# Patient Record
Sex: FEMALE | Race: WHITE | NOT HISPANIC OR LATINO | URBAN - METROPOLITAN AREA
[De-identification: names, ages, dates, MRNs, and addresses within clinical notes are randomized per-mention and may not be internally consistent; named-entity substitution may affect disease eponyms.]

---

## 2020-01-29 ENCOUNTER — INPATIENT (INPATIENT)
Facility: HOSPITAL | Age: 75
LOS: 5 days | Discharge: NURSING, REHAB OR LONG-TERM CARE | DRG: 301 | End: 2020-02-04
Attending: INTERNAL MEDICINE | Admitting: INTERNAL MEDICINE
Payer: MEDICARE

## 2020-01-29 VITALS
OXYGEN SATURATION: 100 % | WEIGHT: 130.07 LBS | HEIGHT: 65 IN | TEMPERATURE: 98 F | SYSTOLIC BLOOD PRESSURE: 113 MMHG | DIASTOLIC BLOOD PRESSURE: 61 MMHG | HEART RATE: 81 BPM | RESPIRATION RATE: 18 BRPM

## 2020-01-29 DIAGNOSIS — I82.409 ACUTE EMBOLISM AND THROMBOSIS OF UNSPECIFIED DEEP VEINS OF UNSPECIFIED LOWER EXTREMITY: ICD-10-CM

## 2020-01-29 LAB
ALBUMIN SERPL ELPH-MCNC: 3.4 G/DL — SIGNIFICANT CHANGE UP (ref 3.3–5)
ALP SERPL-CCNC: 121 U/L — HIGH (ref 40–120)
ALT FLD-CCNC: 24 U/L — SIGNIFICANT CHANGE UP (ref 12–78)
ANION GAP SERPL CALC-SCNC: 6 MMOL/L — SIGNIFICANT CHANGE UP (ref 5–17)
APTT BLD: 39.2 SEC — HIGH (ref 27.5–36.3)
AST SERPL-CCNC: 14 U/L — LOW (ref 15–37)
BASOPHILS # BLD AUTO: 0.04 K/UL — SIGNIFICANT CHANGE UP (ref 0–0.2)
BASOPHILS NFR BLD AUTO: 0.5 % — SIGNIFICANT CHANGE UP (ref 0–2)
BILIRUB SERPL-MCNC: 0.5 MG/DL — SIGNIFICANT CHANGE UP (ref 0.2–1.2)
BUN SERPL-MCNC: 20 MG/DL — SIGNIFICANT CHANGE UP (ref 7–23)
CALCIUM SERPL-MCNC: 8.5 MG/DL — SIGNIFICANT CHANGE UP (ref 8.5–10.1)
CHLORIDE SERPL-SCNC: 109 MMOL/L — HIGH (ref 96–108)
CO2 SERPL-SCNC: 25 MMOL/L — SIGNIFICANT CHANGE UP (ref 22–31)
CREAT SERPL-MCNC: 0.95 MG/DL — SIGNIFICANT CHANGE UP (ref 0.5–1.3)
EOSINOPHIL # BLD AUTO: 0.09 K/UL — SIGNIFICANT CHANGE UP (ref 0–0.5)
EOSINOPHIL NFR BLD AUTO: 1.2 % — SIGNIFICANT CHANGE UP (ref 0–6)
GLUCOSE SERPL-MCNC: 95 MG/DL — SIGNIFICANT CHANGE UP (ref 70–99)
HCT VFR BLD CALC: 34.2 % — LOW (ref 34.5–45)
HGB BLD-MCNC: 11.3 G/DL — LOW (ref 11.5–15.5)
IMM GRANULOCYTES NFR BLD AUTO: 0.5 % — SIGNIFICANT CHANGE UP (ref 0–1.5)
INR BLD: 1.8 RATIO — HIGH (ref 0.88–1.16)
LYMPHOCYTES # BLD AUTO: 2.1 K/UL — SIGNIFICANT CHANGE UP (ref 1–3.3)
LYMPHOCYTES # BLD AUTO: 27.8 % — SIGNIFICANT CHANGE UP (ref 13–44)
MCHC RBC-ENTMCNC: 30.9 PG — SIGNIFICANT CHANGE UP (ref 27–34)
MCHC RBC-ENTMCNC: 33 GM/DL — SIGNIFICANT CHANGE UP (ref 32–36)
MCV RBC AUTO: 93.4 FL — SIGNIFICANT CHANGE UP (ref 80–100)
MONOCYTES # BLD AUTO: 0.64 K/UL — SIGNIFICANT CHANGE UP (ref 0–0.9)
MONOCYTES NFR BLD AUTO: 8.5 % — SIGNIFICANT CHANGE UP (ref 2–14)
NEUTROPHILS # BLD AUTO: 4.65 K/UL — SIGNIFICANT CHANGE UP (ref 1.8–7.4)
NEUTROPHILS NFR BLD AUTO: 61.5 % — SIGNIFICANT CHANGE UP (ref 43–77)
PLATELET # BLD AUTO: 240 K/UL — SIGNIFICANT CHANGE UP (ref 150–400)
POTASSIUM SERPL-MCNC: 3.6 MMOL/L — SIGNIFICANT CHANGE UP (ref 3.5–5.3)
POTASSIUM SERPL-SCNC: 3.6 MMOL/L — SIGNIFICANT CHANGE UP (ref 3.5–5.3)
PROT SERPL-MCNC: 6.6 GM/DL — SIGNIFICANT CHANGE UP (ref 6–8.3)
PROTHROM AB SERPL-ACNC: 20.4 SEC — HIGH (ref 10–12.9)
RBC # BLD: 3.66 M/UL — LOW (ref 3.8–5.2)
RBC # FLD: 15.1 % — HIGH (ref 10.3–14.5)
SODIUM SERPL-SCNC: 140 MMOL/L — SIGNIFICANT CHANGE UP (ref 135–145)
TROPONIN I SERPL-MCNC: <0.015 NG/ML — SIGNIFICANT CHANGE UP (ref 0.01–0.04)
WBC # BLD: 7.56 K/UL — SIGNIFICANT CHANGE UP (ref 3.8–10.5)
WBC # FLD AUTO: 7.56 K/UL — SIGNIFICANT CHANGE UP (ref 3.8–10.5)

## 2020-01-29 PROCEDURE — 93010 ELECTROCARDIOGRAM REPORT: CPT

## 2020-01-29 PROCEDURE — 81001 URINALYSIS AUTO W/SCOPE: CPT

## 2020-01-29 PROCEDURE — 97163 PT EVAL HIGH COMPLEX 45 MIN: CPT | Mod: GP

## 2020-01-29 PROCEDURE — 85730 THROMBOPLASTIN TIME PARTIAL: CPT

## 2020-01-29 PROCEDURE — 83735 ASSAY OF MAGNESIUM: CPT

## 2020-01-29 PROCEDURE — 97116 GAIT TRAINING THERAPY: CPT | Mod: GP

## 2020-01-29 PROCEDURE — 93971 EXTREMITY STUDY: CPT | Mod: 26,LT

## 2020-01-29 PROCEDURE — 86803 HEPATITIS C AB TEST: CPT

## 2020-01-29 PROCEDURE — 80048 BASIC METABOLIC PNL TOTAL CA: CPT

## 2020-01-29 PROCEDURE — 85610 PROTHROMBIN TIME: CPT

## 2020-01-29 PROCEDURE — 85027 COMPLETE CBC AUTOMATED: CPT

## 2020-01-29 PROCEDURE — 36415 COLL VENOUS BLD VENIPUNCTURE: CPT

## 2020-01-29 PROCEDURE — 84100 ASSAY OF PHOSPHORUS: CPT

## 2020-01-29 PROCEDURE — 71045 X-RAY EXAM CHEST 1 VIEW: CPT | Mod: 26

## 2020-01-29 RX ORDER — HEPARIN SODIUM 5000 [USP'U]/ML
5000 INJECTION INTRAVENOUS; SUBCUTANEOUS EVERY 6 HOURS
Refills: 0 | Status: DISCONTINUED | OUTPATIENT
Start: 2020-01-29 | End: 2020-01-30

## 2020-01-29 RX ORDER — HEPARIN SODIUM 5000 [USP'U]/ML
INJECTION INTRAVENOUS; SUBCUTANEOUS
Qty: 25000 | Refills: 0 | Status: DISCONTINUED | OUTPATIENT
Start: 2020-01-29 | End: 2020-01-30

## 2020-01-29 RX ORDER — HEPARIN SODIUM 5000 [USP'U]/ML
2500 INJECTION INTRAVENOUS; SUBCUTANEOUS EVERY 6 HOURS
Refills: 0 | Status: DISCONTINUED | OUTPATIENT
Start: 2020-01-29 | End: 2020-01-30

## 2020-01-29 RX ORDER — HEPARIN SODIUM 5000 [USP'U]/ML
5000 INJECTION INTRAVENOUS; SUBCUTANEOUS ONCE
Refills: 0 | Status: COMPLETED | OUTPATIENT
Start: 2020-01-29 | End: 2020-01-29

## 2020-01-29 RX ADMIN — HEPARIN SODIUM 1100 UNIT(S)/HR: 5000 INJECTION INTRAVENOUS; SUBCUTANEOUS at 22:55

## 2020-01-29 RX ADMIN — HEPARIN SODIUM 5000 UNIT(S): 5000 INJECTION INTRAVENOUS; SUBCUTANEOUS at 22:52

## 2020-01-29 NOTE — ED PROVIDER NOTE - OBJECTIVE STATEMENT
75 y/o female with PMHx of DVT presents to the ED BIBEMS from [facility] c/o intermittent LLE pain x1 month. Endorses associated +LLE edema. Reports she was dx with LLE DVT at the end of December and was started on Xarelto, but has not had relief of sx. No fever, SOB, or CP. NKDA.

## 2020-01-29 NOTE — ED ADULT NURSE NOTE - OBJECTIVE STATEMENT
Pt presents to the ED for eval of LLE pain, redness and swelling. Pt states she had an outpt doppler done at her residence, was told she has a +DVT and needs to come to the ED for further evaluation. Pt denies chest pain/sob at this time, no recent travel. Pt with +redness, swelling and tenderness to left leg.

## 2020-01-29 NOTE — ED ADULT NURSE REASSESSMENT NOTE - NS ED NURSE REASSESS COMMENT FT1
Patient received at 2200.  Alert to person and place and month.  Patient started on Heparin drip.  Repeat Pt/Ptt to be drawn at 0426 AM.  Patient repositioned for comfort at this time.  Will continue to monitor.

## 2020-01-29 NOTE — ED PROVIDER NOTE - NSTIMEPROVIDERCAREINITIATE_GEN_ER
Fax Date: 02/03/19  Received I/n clinic on: 02/04/19    Received from: [id number] is requesting the following: Resident has an 25 Fr 10cc diaz placed for neurogenic bladder. The last 2 days she has had to have the diaz replaced 4 times d/t it coming out. Can we increase the balloon size? Orders per Craven Dakins APNP: Ok to size up balloon.     Med list updated  Faxed back to facility  Labs sent to STAT Scan: N/A  Labs sent to Scanning via interoffice: N/A      / 29-Jan-2020 20:50

## 2020-01-29 NOTE — ED ADULT NURSE NOTE - NSIMPLEMENTINTERV_GEN_ALL_ED
Implemented All Fall Risk Interventions:  [place] to call system. Call bell, personal items and telephone within reach. Instruct patient to call for assistance. Room bathroom lighting operational. Non-slip footwear when patient is off stretcher. Physically safe environment: no spills, clutter or unnecessary equipment. Stretcher in lowest position, wheels locked, appropriate side rails in place. Provide visual cue, wrist band, yellow gown, etc. Monitor gait and stability. Monitor for mental status changes and reorient to person, place, and time. Review medications for side effects contributing to fall risk. Reinforce activity limits and safety measures with patient and family.

## 2020-01-29 NOTE — ED PROVIDER NOTE - NS_ ATTENDINGSCRIBEDETAILS _ED_A_ED_FT
I, Jose Cadena MD,  performed the initial face to face bedside interview with this patient regarding history of present illness, review of symptoms and relevant past medical, social and family history.  I completed an independent physical examination.    The history, relevant review of systems, past medical and surgical history, medical decision making, and physical examination was documented by the scribe in my presence and I attest to the accuracy of the documentation.

## 2020-01-29 NOTE — ED ADULT TRIAGE NOTE - CHIEF COMPLAINT QUOTE
pt presents to ED with complaints of left lower leg pain. pt states leg is also red, hot, and swollen. pt brought in from [facility]

## 2020-01-30 DIAGNOSIS — Z98.890 OTHER SPECIFIED POSTPROCEDURAL STATES: Chronic | ICD-10-CM

## 2020-01-30 DIAGNOSIS — I80.00 PHLEBITIS AND THROMBOPHLEBITIS OF SUPERFICIAL VESSELS OF UNSPECIFIED LOWER EXTREMITY: ICD-10-CM

## 2020-01-30 LAB
ANION GAP SERPL CALC-SCNC: 6 MMOL/L — SIGNIFICANT CHANGE UP (ref 5–17)
APTT BLD: 45.1 SEC — HIGH (ref 27.5–36.3)
APTT BLD: 89.9 SEC — HIGH (ref 27.5–36.3)
APTT BLD: > 200 SEC (ref 27.5–36.3)
BUN SERPL-MCNC: 18 MG/DL — SIGNIFICANT CHANGE UP (ref 7–23)
CALCIUM SERPL-MCNC: 8.3 MG/DL — LOW (ref 8.5–10.1)
CHLORIDE SERPL-SCNC: 112 MMOL/L — HIGH (ref 96–108)
CO2 SERPL-SCNC: 23 MMOL/L — SIGNIFICANT CHANGE UP (ref 22–31)
CREAT SERPL-MCNC: 0.68 MG/DL — SIGNIFICANT CHANGE UP (ref 0.5–1.3)
GLUCOSE SERPL-MCNC: 96 MG/DL — SIGNIFICANT CHANGE UP (ref 70–99)
HCT VFR BLD CALC: 30.1 % — LOW (ref 34.5–45)
HGB BLD-MCNC: 10 G/DL — LOW (ref 11.5–15.5)
INR BLD: 1.3 RATIO — HIGH (ref 0.88–1.16)
MAGNESIUM SERPL-MCNC: 2 MG/DL — SIGNIFICANT CHANGE UP (ref 1.6–2.6)
MCHC RBC-ENTMCNC: 30.8 PG — SIGNIFICANT CHANGE UP (ref 27–34)
MCHC RBC-ENTMCNC: 33.2 GM/DL — SIGNIFICANT CHANGE UP (ref 32–36)
MCV RBC AUTO: 92.6 FL — SIGNIFICANT CHANGE UP (ref 80–100)
PHOSPHATE SERPL-MCNC: 3.3 MG/DL — SIGNIFICANT CHANGE UP (ref 2.5–4.5)
PLATELET # BLD AUTO: 203 K/UL — SIGNIFICANT CHANGE UP (ref 150–400)
POTASSIUM SERPL-MCNC: 3.5 MMOL/L — SIGNIFICANT CHANGE UP (ref 3.5–5.3)
POTASSIUM SERPL-SCNC: 3.5 MMOL/L — SIGNIFICANT CHANGE UP (ref 3.5–5.3)
PROTHROM AB SERPL-ACNC: 14.5 SEC — HIGH (ref 10–12.9)
RBC # BLD: 3.25 M/UL — LOW (ref 3.8–5.2)
RBC # FLD: 14.8 % — HIGH (ref 10.3–14.5)
SODIUM SERPL-SCNC: 141 MMOL/L — SIGNIFICANT CHANGE UP (ref 135–145)
WBC # BLD: 6.94 K/UL — SIGNIFICANT CHANGE UP (ref 3.8–10.5)
WBC # FLD AUTO: 6.94 K/UL — SIGNIFICANT CHANGE UP (ref 3.8–10.5)

## 2020-01-30 PROCEDURE — 99223 1ST HOSP IP/OBS HIGH 75: CPT

## 2020-01-30 RX ORDER — ALPRAZOLAM 0.25 MG
0.5 TABLET ORAL
Refills: 0 | Status: DISCONTINUED | OUTPATIENT
Start: 2020-01-30 | End: 2020-02-01

## 2020-01-30 RX ORDER — ACETAMINOPHEN 500 MG
325 TABLET ORAL EVERY 6 HOURS
Refills: 0 | Status: DISCONTINUED | OUTPATIENT
Start: 2020-01-30 | End: 2020-02-04

## 2020-01-30 RX ORDER — CHOLECALCIFEROL (VITAMIN D3) 125 MCG
1000 CAPSULE ORAL DAILY
Refills: 0 | Status: DISCONTINUED | OUTPATIENT
Start: 2020-01-30 | End: 2020-02-04

## 2020-01-30 RX ORDER — GABAPENTIN 400 MG/1
100 CAPSULE ORAL
Refills: 0 | Status: DISCONTINUED | OUTPATIENT
Start: 2020-01-30 | End: 2020-02-04

## 2020-01-30 RX ORDER — ENOXAPARIN SODIUM 100 MG/ML
60 INJECTION SUBCUTANEOUS EVERY 12 HOURS
Refills: 0 | Status: DISCONTINUED | OUTPATIENT
Start: 2020-01-30 | End: 2020-02-04

## 2020-01-30 RX ORDER — WARFARIN SODIUM 2.5 MG/1
5 TABLET ORAL DAILY
Refills: 0 | Status: DISCONTINUED | OUTPATIENT
Start: 2020-01-30 | End: 2020-02-01

## 2020-01-30 RX ORDER — LANOLIN ALCOHOL/MO/W.PET/CERES
3 CREAM (GRAM) TOPICAL ONCE
Refills: 0 | Status: COMPLETED | OUTPATIENT
Start: 2020-01-30 | End: 2020-01-30

## 2020-01-30 RX ORDER — PANTOPRAZOLE SODIUM 20 MG/1
40 TABLET, DELAYED RELEASE ORAL
Refills: 0 | Status: DISCONTINUED | OUTPATIENT
Start: 2020-01-30 | End: 2020-01-30

## 2020-01-30 RX ORDER — POLYETHYLENE GLYCOL 3350 17 G/17G
17 POWDER, FOR SOLUTION ORAL DAILY
Refills: 0 | Status: DISCONTINUED | OUTPATIENT
Start: 2020-01-30 | End: 2020-02-04

## 2020-01-30 RX ADMIN — Medication 3 MILLIGRAM(S): at 22:55

## 2020-01-30 RX ADMIN — Medication 1000 UNIT(S): at 15:53

## 2020-01-30 RX ADMIN — Medication 325 MILLIGRAM(S): at 18:11

## 2020-01-30 RX ADMIN — HEPARIN SODIUM 900 UNIT(S)/HR: 5000 INJECTION INTRAVENOUS; SUBCUTANEOUS at 08:39

## 2020-01-30 RX ADMIN — ENOXAPARIN SODIUM 60 MILLIGRAM(S): 100 INJECTION SUBCUTANEOUS at 18:09

## 2020-01-30 RX ADMIN — HEPARIN SODIUM 0 UNIT(S)/HR: 5000 INJECTION INTRAVENOUS; SUBCUTANEOUS at 06:40

## 2020-01-30 RX ADMIN — Medication 325 MILLIGRAM(S): at 16:43

## 2020-01-30 RX ADMIN — Medication 325 MILLIGRAM(S): at 12:56

## 2020-01-30 RX ADMIN — HEPARIN SODIUM 900 UNIT(S)/HR: 5000 INJECTION INTRAVENOUS; SUBCUTANEOUS at 15:50

## 2020-01-30 RX ADMIN — Medication 0.5 MILLIGRAM(S): at 16:43

## 2020-01-30 RX ADMIN — WARFARIN SODIUM 5 MILLIGRAM(S): 2.5 TABLET ORAL at 22:55

## 2020-01-30 RX ADMIN — PANTOPRAZOLE SODIUM 40 MILLIGRAM(S): 20 TABLET, DELAYED RELEASE ORAL at 12:56

## 2020-01-30 RX ADMIN — Medication 325 MILLIGRAM(S): at 22:55

## 2020-01-30 RX ADMIN — GABAPENTIN 100 MILLIGRAM(S): 400 CAPSULE ORAL at 16:43

## 2020-01-30 RX ADMIN — Medication 325 MILLIGRAM(S): at 13:15

## 2020-01-30 NOTE — H&P ADULT - ASSESSMENT
75 yo F with a PMH recently diagnosed DVT, GERD, anxiety, osteoporosis, and hepatic cysts who presents with LLE swelling, due to recurrent DVTs.    1) Recurrent LLE DVT  - _______________________________    2) Osteoporosis  - With lumbar compression fractures s/p recent surgery  - C/w standing Acetaminophen 325 mg QID and Gabapentin 100 mg BID  - C/w Vitamin D    3) GERD  - C/w PPI    4) Anxiety  - C/w Xanax 0.5 mg BID    5) Constipation  - C/w Miralax QD and (home) Mylanta PRN    6) Prophylactic measure  - DVT PPX: IMPROVE score of 3, on heparin gtt  - Diet: regular  - Dispo: pending outpatient plan 73 yo F with a PMH recently diagnosed DVT, GERD, anxiety, osteoporosis, and hepatic cysts who presents with LLE swelling, due to recurrent DVTs.    1) Recurrent DVT of left lower extremity  - Patient had LLE DVT about one month ago, at [facility], post-surgery. Unclear how extensive clot burden was at that time  - Was placed on Xarelto, and since, swelling does not appear to significantly have decreased  - Patient currently denies pain, although had been having recurrent pains well after beginning Xarelto  - Will start heparin gtt for now  - Would attempt to obtain records from [place] to determine how extensive clot was, and if burden has worsened  - No known personal or family history of blood clots    2) Osteoporosis  - With lumbar compression fractures s/p recent surgery  - C/w standing home Acetaminophen 325 mg QID and Gabapentin 100 mg BID for pain control  - C/w Vitamin D    3) GERD  - C/w PPI    4) Anxiety  - C/w Xanax 0.5 mg BID    5) Constipation  - C/w Miralax QD and (home) Mylanta PRN    6) Prophylactic measure  - DVT PPX: IMPROVE score of 3, on heparin gtt  - Diet: regular  - Dispo: pending outpatient plan 75 yo F with a PMH recently diagnosed DVT, GERD, anxiety, osteoporosis, and hepatic cysts who presents with LLE swelling, due to recurrent DVTs.    1) Recurrent DVT of left lower extremity  - Patient had LLE DVT about one month ago, at [facility], post-surgery. Unclear how extensive clot burden was at that time  - Was placed on Xarelto, and since, swelling does not appear to significantly have decreased  - Patient currently denies pain, although had been having recurrent pains well after beginning Xarelto  - Will start heparin gtt for now  - Would attempt to obtain records from [place] to determine how extensive clot was, and if burden has worsened  - No known personal or family history of blood clots  - A/C team consult    2) Osteoporosis  - With lumbar compression fractures s/p recent surgery  - C/w standing home Acetaminophen 325 mg QID and Gabapentin 100 mg BID for pain control  - C/w Vitamin D    3) GERD  - C/w PPI    4) Anxiety  - C/w Xanax 0.5 mg BID    5) Constipation  - C/w Miralax QD and (home) Mylanta PRN    6) Prophylactic measure  - DVT PPX: IMPROVE score of 3, on heparin gtt  - Diet: regular  - Dispo: pending outpatient plan

## 2020-01-30 NOTE — H&P ADULT - NSHPPHYSICALEXAM_GEN_ALL_CORE
Vital Signs Last 24 Hrs  T(C): 36.6 (30 Jan 2020 04:52), Max: 36.8 (30 Jan 2020 00:45)  T(F): 97.8 (30 Jan 2020 04:52), Max: 98.3 (30 Jan 2020 00:45)  HR: 73 (30 Jan 2020 04:52) (69 - 81)  BP: 110/69 (30 Jan 2020 04:52) (102/64 - 113/63)  BP(mean): 76 (30 Jan 2020 02:00) (76 - 76)  RR: 18 (30 Jan 2020 04:52) (18 - 18)  SpO2: 96% (30 Jan 2020 04:52) (96% - 100%)    GENERAL: No acute distress  HEENT: PERRL, EOMI, MMM, no oropharyngeal lesions  NECK: supple, no stiffness, no JVD, no thyromegaly  PULM: respirations non-labored, clear to auscultation bilaterally, no rales, rhonchi, or wheezes  CV: regular rate and rhythm, no murmurs, gallops, or rubs  GI: abdomen soft, nontender, nondistended, no masses felt, normal bowel sounds  MSK: strength 5/5 bilateral upper/lower extremities. No joint swelling, erythema, or warmth.  LYMPH: no anterior cervical, posterior cervical, supraclavicular, or inguinal lymphadenopathy  NEURO: A&Ox3, no tremors, sensation intact  SKIN: LLE edema with slight erythema but without warmth or TTP. 2+ DPP. No rashes or lesions

## 2020-01-30 NOTE — H&P ADULT - NSHPSOCIALHISTORY_GEN_ALL_CORE
No significant smoking or alcohol history.  No drug use.  Currently at [facility], but prior to two months ago/back surgeries, was living by herself in [place].

## 2020-01-30 NOTE — CONSULT NOTE ADULT - ASSESSMENT
This is a 74 year old female  with a PMH recently diagnosed DVT, GERD, anxiety, osteoporosis, and hepatic cysts who was sent from [facility] rehab for evaluation of LLE swelling. She was diagnosed with a LLE DVT about one month ago (she is unsure how extensive it was), post-op from lumbar spinal surgery at [facility] in [place]. She was placed on Xarelto and subsequently sent to [facility].     Plan:    :need to obtain results of pervious US from [facility] in [place] to determine if this is an acute DVT  or the same one dxed two months ago.  : cont on UFH until we get results then start pt on an oral anticoagulation medication.  Can resume xarelto if it is not a new DVT, if it is consider xarelto (must confirm non compliance before changing to a different oral anticoagulant)  : daily cbc/bmp  :le venodyne to rt le  :Thanks for consult Will sign off case, Spoke to Dr Monson and she states she will manage anticoagulation.  Please reconsult if needed.

## 2020-01-30 NOTE — H&P ADULT - NSHPLABSRESULTS_GEN_ALL_CORE
Labs personally reviewed and interpreted. Notable for no leukocytosis (WBC 7.56) without left shift). Hb normal at 11.3. Plts 240. INR 1.8 (on Xarelto). Electrolytes wnl. BUN/creatinine normal at 20/0.95. Alk phos minimally elevated at 121 (has compression fxs). AST and ALT normal. Albumin 3.4. Troponin negative x1.    CXR personally reviewed and interpreted. Notable for clear lungs, no effusions, focal consolidations, interstitial markings, pneumothorax, or obvious cardiomegaly.  Bilateral LE US personally reviewed and interpreted. Notable for occlusive thrombus of the left common femoral, femoral and popliteal veins.    No EKG performed.

## 2020-01-30 NOTE — CHART NOTE - NSCHARTNOTEFT_GEN_A_CORE
pt seen and exam  on UHF drip  change to LMWH  consult Heme re: dvt on doac; I choose VKA; this was a provoked dvt

## 2020-01-30 NOTE — H&P ADULT - NSHPREVIEWOFSYSTEMS_GEN_ALL_CORE
Gen: negative for fevers, chills, weight loss, weight gain, malaise, fatigue  Eyes: no blurred vision or lacrimation  ENT: no tinnitus, vertigo, or decreased hearing  Resp: no wheezing, dyspnea, pleuritic chest pain, hemoptysis, or orthopnea  CV: no chest pain, dyspnea on exertion, or palpitations  GI: no nausea, vomiting, abdominal pain, diarrhea, constipation, melena, or hematochezia  : no dysuria, hematuria, discharge, or incontinence  MSK: + chronic back pain. No arthralgias, joint swelling, or myalgias  Neuro: + weakness. No focal deficits, confusion, dizziness, tremors, or seizures  Skin: + edema. No rash or lesions

## 2020-01-30 NOTE — H&P ADULT - HISTORY OF PRESENT ILLNESS
75 yo F with a PMH recently diagnosed DVT, GERD, anxiety, osteoporosis, and hepatic cysts who presents with LLE swelling. She was diagnosed with a LLE DVT about one month ago (she is unsure how extensive it was), post-op from lumbar spinal surgery at [facility] in [place]. She was placed on Xarelto and subsequently sent to [facility]. Since then, she feels the pain has markedly improved, and the LLE swelling has improved slightly, but not much. She states she is able to walk, and she has not had falls recently. She denies any other symptoms, such as fevers, chills, SOB, CP, cough, nausea, vomiting, abdominal pain, diarrhea, or dysuria. She feels her appetite is good.  Of note, she had surgery about 1-2 months ago for a T10, T12, and L2 compression fracture at [facility]. She denied any trauma.    In the ED, she was started on a heparin gtt. 73 yo F with a PMH recently diagnosed DVT, GERD, anxiety, osteoporosis, and hepatic cysts who presents with LLE swelling. She was diagnosed with a LLE DVT about one month ago (she is unsure how extensive it was), post-op from lumbar spinal surgery at [facility] in [place]. She was placed on Xarelto and subsequently sent to [facility]. Since then, she feels the pain has markedly improved overall (but still is intermittent), and the LLE swelling has improved slightly, but not much. She states she is able to walk, and she has not had falls recently. She denies any other symptoms, such as fevers, chills, SOB, CP, cough, nausea, vomiting, abdominal pain, diarrhea, or dysuria. She feels her appetite is good.  Of note, she had surgery about 1-2 months ago for a T10, T12, and L2 compression fracture at [facility]. She denied any trauma.    In the ED, she was started on a heparin gtt.

## 2020-01-31 DIAGNOSIS — I82.412 ACUTE EMBOLISM AND THROMBOSIS OF LEFT FEMORAL VEIN: ICD-10-CM

## 2020-01-31 LAB
HCV AB S/CO SERPL IA: 0.14 S/CO — SIGNIFICANT CHANGE UP (ref 0–0.99)
HCV AB SERPL-IMP: SIGNIFICANT CHANGE UP
INR BLD: 1.29 RATIO — HIGH (ref 0.88–1.16)
PROTHROM AB SERPL-ACNC: 14.4 SEC — HIGH (ref 10–12.9)

## 2020-01-31 PROCEDURE — 99232 SBSQ HOSP IP/OBS MODERATE 35: CPT

## 2020-01-31 RX ORDER — LIDOCAINE 4 G/100G
1 CREAM TOPICAL ONCE
Refills: 0 | Status: COMPLETED | OUTPATIENT
Start: 2020-01-31 | End: 2020-01-31

## 2020-01-31 RX ADMIN — Medication 325 MILLIGRAM(S): at 11:28

## 2020-01-31 RX ADMIN — Medication 1000 UNIT(S): at 11:28

## 2020-01-31 RX ADMIN — GABAPENTIN 100 MILLIGRAM(S): 400 CAPSULE ORAL at 17:07

## 2020-01-31 RX ADMIN — ENOXAPARIN SODIUM 60 MILLIGRAM(S): 100 INJECTION SUBCUTANEOUS at 06:19

## 2020-01-31 RX ADMIN — Medication 325 MILLIGRAM(S): at 23:03

## 2020-01-31 RX ADMIN — LIDOCAINE 1 PATCH: 4 CREAM TOPICAL at 19:26

## 2020-01-31 RX ADMIN — Medication 325 MILLIGRAM(S): at 12:24

## 2020-01-31 RX ADMIN — POLYETHYLENE GLYCOL 3350 17 GRAM(S): 17 POWDER, FOR SOLUTION ORAL at 11:29

## 2020-01-31 RX ADMIN — Medication 0.5 MILLIGRAM(S): at 06:19

## 2020-01-31 RX ADMIN — Medication 325 MILLIGRAM(S): at 06:19

## 2020-01-31 RX ADMIN — Medication 325 MILLIGRAM(S): at 17:08

## 2020-01-31 RX ADMIN — ENOXAPARIN SODIUM 60 MILLIGRAM(S): 100 INJECTION SUBCUTANEOUS at 17:08

## 2020-01-31 RX ADMIN — LIDOCAINE 1 PATCH: 4 CREAM TOPICAL at 15:47

## 2020-01-31 RX ADMIN — Medication 0.5 MILLIGRAM(S): at 17:07

## 2020-01-31 RX ADMIN — GABAPENTIN 100 MILLIGRAM(S): 400 CAPSULE ORAL at 06:19

## 2020-01-31 RX ADMIN — WARFARIN SODIUM 5 MILLIGRAM(S): 2.5 TABLET ORAL at 23:00

## 2020-01-31 NOTE — PROGRESS NOTE ADULT - SUBJECTIVE AND OBJECTIVE BOX
73 yo F with a PMH recently diagnosed DVT, GERD, anxiety, osteoporosis, and hepatic cysts who presents with LLE swelling. She was diagnosed with a LLE DVT about one month ago (she is unsure how extensive it was), post-op from lumbar spinal surgery at [facility] in [place]. She was placed on Xarelto and subsequently sent to [facility]. Since then, she feels the pain has markedly improved overall (but still is intermittent), and the LLE swelling has improved slightly, but not much. She states she is able to walk, and she has not had falls recently.  Pt also  had surgery about 1-2 months ago for a T10, T12, and L2 compression fracture at [facility]. She denied any trauma.              Neurological: A& O x 3    Skin: Warm    Respiratory and Thorax: normal effort; Breath sounds: normal; No rales/wheezing/rhonchi  	  Cardiovascular: S1, S2, regular, NMBR	    Gastrointestinal: BS + x 4Q, nontender	    Genitourinary:  Bladder nondistended, nontender    Musculoskeletal:   General Right:   no muscle/joint tenderness,   normal tone, no joint swelling,   ROM: full	    General Left:   no muscle/joint tenderness,   normal tone, no joint swelling,   ROM: full                                10.0   6.94  )-----------( 203      ( 30 Jan 2020 04:35 )             30.1       01-30    141  |  112<H>  |  18  ----------------------------<  96  3.5   |  23  |  0.68    Ca    8.3<L>      30 Jan 2020 04:35  Phos  3.3     01-30  Mg     2.0     01-30    TPro  6.6  /  Alb  3.4  /  TBili  0.5  /  DBili  x   /  AST  14<L>  /  ALT  24  /  AlkPhos  121<H>  01-29      PT/INR - ( 29 Jan 2020 20:38 )   PT: 20.4 sec;   INR: 1.80 ratio         PTT - ( 30 Jan 2020 04:35 )  PTT:> 200 sec				  < from: US Duplex Venous Lower Ext Ltd, Left (01.29.20 @ 22:18) >  IMPRESSION:     occlusive thrombus of the left common femoral, femoral and popliteal veins

## 2020-01-31 NOTE — CONSULT NOTE ADULT - SUBJECTIVE AND OBJECTIVE BOX
Patient is a 74y old  Female who presents with a chief complaint of persistent DVT (31 Jan 2020 15:38)      HPI:  75 yo F with a PMH recently diagnosed DVT, GERD, anxiety, osteoporosis, and hepatic cysts who presents with LLE swelling. She was diagnosed with a LLE DVT about one month ago (she is unsure how extensive it was), post-op from lumbar spinal surgery at [facility] in [place]. She was placed on Xarelto and subsequently sent to [facility]. Since then, she feels the pain has markedly improved overall (but still is intermittent), and the LLE swelling has improved slightly,    patient found to have persistent DVT. And is currently on heparin and warfarin As it is unclear if the DVT has progressed in absence of previous records.    Of note, she had surgery about 1-2 months ago for a T10, T12, and L2 compression fracture at [facility]. She denied any trauma.    Poor historian        PAST MEDICAL & SURGICAL HISTORY:  Hepatic cyst  Osteoporosis  Chronic GERD  Anxiety  Status post lumbar spine operation      REVIEW OF SYSTEMS    poor historian  Complains of fatigue and anxiety         FAMILY HISTORY:  FHx: leukemia: mother, age 50s  FH: diabetes mellitus      Home Medications:  gabapentin 100 mg oral capsule: 1 cap(s) orally 2 times a day (30 Jan 2020 06:18)  Mylanta oral suspension: 30 milliliter(s) orally once a day, As Needed (30 Jan 2020 06:18)  pantoprazole 40 mg oral delayed release tablet: 1 tab(s) orally once a day (30 Jan 2020 06:18)  polyethylene glycol 3350 oral powder for reconstitution: 17 gram(s) orally once a day (30 Jan 2020 06:18)  Tylenol 325 mg oral tablet: 1 tab(s) orally every 6 hours (30 Jan 2020 06:18)  Vitamin D3 1000 intl units (25 mcg) oral tablet: 1 tab(s) orally once a day (30 Jan 2020 06:18)  Xanax 0.5 mg oral tablet: 1 tab(s) orally 2 times a day (30 Jan 2020 06:18)  Xarelto 20 mg oral tablet: 1 tab(s) orally once a day (in the evening) (30 Jan 2020 06:18)      MEDICATIONS  (STANDING):  acetaminophen   Tablet .. 325 milliGRAM(s) Oral every 6 hours  ALPRAZolam 0.5 milliGRAM(s) Oral two times a day  cholecalciferol 1000 Unit(s) Oral daily  enoxaparin Injectable 60 milliGRAM(s) SubCutaneous every 12 hours  gabapentin 100 milliGRAM(s) Oral two times a day  polyethylene glycol 3350 17 Gram(s) Oral daily  warfarin 5 milliGRAM(s) Oral daily    MEDICATIONS  (PRN):  aluminum hydroxide/magnesium hydroxide/simethicone Suspension 30 milliLiter(s) Oral every 6 hours PRN Dyspepsia      PHYSICAL EXAM:  Vital Signs Last 24 Hrs  T(C): 36.4 (31 Jan 2020 16:40), Max: 36.4 (31 Jan 2020 10:44)  T(F): 97.6 (31 Jan 2020 16:40), Max: 97.6 (31 Jan 2020 10:44)  HR: 77 (31 Jan 2020 16:40) (71 - 77)  BP: 117/52 (31 Jan 2020 16:40) (103/55 - 118/57)  BP(mean): --  RR: 19 (31 Jan 2020 16:40) (18 - 19)  SpO2: 97% (31 Jan 2020 16:40) (97% - 98%)      somewhat anxious and restless test elderly lady  Chest clear  Abdomen soft  To lower extremity edema present  Somewhat confused        LABS:                        10.0   6.94  )-----------( 203      ( 30 Jan 2020 04:35 )             30.1     30 Jan 2020 04:35    141    |  112    |  18     ----------------------------<  96     3.5     |  23     |  0.68     Ca    8.3        30 Jan 2020 04:35  Phos  3.3       30 Jan 2020 04:35  Mg     2.0       30 Jan 2020 04:35    TPro  6.6    /  Alb  3.4    /  TBili  0.5    /  DBili  x      /  AST  14     /  ALT  24     /  AlkPhos  121    29 Jan 2020 20:38    LIVER FUNCTIONS - ( 29 Jan 2020 20:38 )  Alb: 3.4 g/dL / Pro: 6.6 gm/dL / ALK PHOS: 121 U/L / ALT: 24 U/L / AST: 14 U/L / GGT: x           PT/INR - ( 31 Jan 2020 07:57 )   PT: 14.4 sec;   INR: 1.29 ratio         PTT - ( 30 Jan 2020 21:44 )  PTT:45.1 sec      RADIOLOGY & ADDITIONAL STUDIES:      EXAM:  US DPLX LWR EXT VEINS LTD LT                            PROCEDURE DATE:  01/29/2020          INTERPRETATION:  CLINICAL INFORMATION: Swollen lower extremity    COMPARISON: None available.    TECHNIQUE: Duplex sonography of the LEFT LOWER extremity veins with color and spectral Doppler, with and without compression.      FINDINGS:    There is occlusive thrombus of the left common femoral, femoral and popliteal veins.     The contralateral common femoral vein is patent.      No calf vein thrombosis is detected.    IMPRESSION:     occlusive thrombus of the left common femoral, femoral and popliteal veins
HPI:  73 yo F with a PMH recently diagnosed DVT, GERD, anxiety, osteoporosis, and hepatic cysts who presents with LLE swelling. She was diagnosed with a LLE DVT about one month ago (she is unsure how extensive it was), post-op from lumbar spinal surgery at [facility] in [place]. She was placed on Xarelto and subsequently sent to [facility]. Since then, she feels the pain has markedly improved overall (but still is intermittent), and the LLE swelling has improved slightly, but not much. She states she is able to walk, and she has not had falls recently.  Pt also  had surgery about 1-2 months ago for a T10, T12, and L2 compression fracture at [facility]. She denied any trauma.    In the ED, she was started on a heparin gtt. on 11-.    Patient is a 74y old  Female who presents with a chief complaint swelling to LLE for about one month, pt denies pain.      Consulted by Dr. Shea Monson  for VTE prophylaxis, risk stratification, and anticoagulation management.    PAST MEDICAL & SURGICAL HISTORY:  Hepatic cyst  Osteoporosis  Chronic GERD  Anxiety  Status post lumbar spine operation          IMPROVE VTE Individual Risk Assessment    RISK                                                                Points    [x  ] Previous VTE                                                  3    [  ] Thrombophilia                                               2    [  ] Lower limb paralysis                                      2        (unable to hold up >15 seconds)      [  ] Current Cancer                                              2         (within 6 months)    [  ] Immobilization > 24 hrs                                1    [ x ] ICU/CCU stay > 24 hours                              1    [ x ] Age > 60                                                      1    IMPROVE VTE Score ____5_____    IMPROVE Score 0-1: Low Risk, No VTE prophylaxis required for most patients, encourage ambulation.   IMPROVE Score 2-3: At risk, pharmacologic VTE prophylaxis is indicated for most patients (in the absence of a contraindication)  IMPROVE Score > or = 4: High Risk, pharmacologic VTE prophylaxis is indicated for most patients (in the absence of a contraindication)        IMPROVE Bleeding Risk Score    Falls Risk:   High ( x )  Mod (  )  Low (  )    crcl:71.2  bmi 22.7  cr .68  Pt seen at bedside in CICU.  Discussed her anticoagulation with [facility]FH for now.  Pt states she thinks about 2 months ago is when she had her back surg and she got a clot in her left leg.  Denies any pain at the present time. Does not remember what anticoagulation she was on in [facility].    Vital Signs Last 24 Hrs  T(C): 36 (30 Jan 2020 11:55), Max: 36.8 (30 Jan 2020 00:45)  T(F): 96.8 (30 Jan 2020 11:55), Max: 98.3 (30 Jan 2020 00:45)  HR: 73 (30 Jan 2020 04:52) (69 - 81)  BP: 110/69 (30 Jan 2020 04:52) (102/64 - 113/63)  BP(mean): 76 (30 Jan 2020 02:00) (76 - 76)  RR: 18 (30 Jan 2020 04:52) (18 - 18)  SpO2: 96% (30 Jan 2020 04:52) (96% - 100%)  FAMILY HISTORY:  FHx: leukemia: mother, age 50s  FH: diabetes mellitus    Denies any personal or familial history of clotting or bleeding disorders.    Allergies    No Known Drug Allergies  shellfish (Unknown)    Intolerances        REVIEW OF SYSTEMS    (  )Fever	     (  )Constipation	(  )SOB				(  )Headache	(  )Dysuria  (  )Chills	     (  )Melena	(  )Dyspnea present on exertion	                    (  )Dizziness                    (  )Polyuria  (  )Nausea	     (  )Hematochezia	(  )Cough			                    (  )Syncope   	(  )Hematuria  (  )Vomiting    (  )Chest Pain	(  )Wheezing			(  )Weakness  (  )Diarrhea     (  )Palpitations	(  )Anorexia			(x  )Myalgia    Pertinent positives in HPI and daily subjective.  All other ROS negative.      PHYSICAL EXAM:    Constitutional: Appears Well    Neurological: A& O x 3    Skin: Warm    Respiratory and Thorax: normal effort; Breath sounds: normal; No rales/wheezing/rhonchi  	  Cardiovascular: S1, S2, regular, NMBR	    Gastrointestinal: BS + x 4Q, nontender	    Genitourinary:  Bladder nondistended, nontender    Musculoskeletal:   General Right:   no muscle/joint tenderness,   normal tone, no joint swelling,   ROM: full	    General Left:   no muscle/joint tenderness,   normal tone, no joint swelling,   ROM: full      Lower extrems:   Right: no calf tenderness              negative jane's sign               + pedal pulses    Left:   no calf tenderness              negative jane's sign               + pedal pulses               + edema 2+                          10.0   6.94  )-----------( 203      ( 30 Jan 2020 04:35 )             30.1       01-30    141  |  112<H>  |  18  ----------------------------<  96  3.5   |  23  |  0.68    Ca    8.3<L>      30 Jan 2020 04:35  Phos  3.3     01-30  Mg     2.0     01-30    TPro  6.6  /  Alb  3.4  /  TBili  0.5  /  DBili  x   /  AST  14<L>  /  ALT  24  /  AlkPhos  121<H>  01-29      PT/INR - ( 29 Jan 2020 20:38 )   PT: 20.4 sec;   INR: 1.80 ratio         PTT - ( 30 Jan 2020 04:35 )  PTT:> 200 sec				  < from:  Duplex Venous Lower Ext Ltd, Left (01.29.20 @ 22:18) >  IMPRESSION:     occlusive thrombus of the left common femoral, femoral and popliteal veins        < end of copied text >    MEDICATIONS  (STANDING):  acetaminophen   Tablet .. 325 milliGRAM(s) Oral every 6 hours  ALPRAZolam 0.5 milliGRAM(s) Oral two times a day  cholecalciferol 1000 Unit(s) Oral daily  gabapentin 100 milliGRAM(s) Oral two times a day  heparin  Infusion.  Unit(s)/Hr IV Continuous <Continuous>  pantoprazole    Tablet 40 milliGRAM(s) Oral before breakfast  polyethylene glycol 3350 17 Gram(s) Oral daily          DVT Prophylaxis:  LMWH                   (  )  Heparin SQ           (  )  Coumadin             (  )  Xarelto                  (  )  Eliquis                   (  )  Venodynes           (x rt le  )  Ambulation          (x  )  UFH                       (x  )  Contraindicated  (  )  EC Aspirin             (  )

## 2020-01-31 NOTE — PROGRESS NOTE ADULT - ASSESSMENT
This is a 74 year old female  with a PMH recently diagnosed DVT, GERD, anxiety, osteoporosis, and hepatic cysts who was sent from [facility] for evaluation of LLE swelling. She was diagnosed with a LLE DVT about one month ago (she is unsure how extensive it was), post-op from lumbar spinal surgery at [facility] in [place]. She was placed on Xarelto and subsequently sent to [facility].     DVT  bridge LMWH and vka  leg looks and feels better  c/w present regimen    v anxious d/w cousin, she is at [facility]

## 2020-01-31 NOTE — PHYSICAL THERAPY INITIAL EVALUATION ADULT - GAIT PATTERN USED, PT EVAL
Pt with scoliotic type posture and difficulty standing upright. Pt required verbal cues to semi-correct.

## 2020-01-31 NOTE — PHYSICAL THERAPY INITIAL EVALUATION ADULT - ACTIVE RANGE OF MOTION EXAMINATION, REHAB EVAL
bilateral shoulder flex ~ 120 degrees, bilateral hip flex ~ 90 degrees, and bilateral DF neutral./Right UE Active ROM was WFL (within functional limits)/Left UE Active ROM was WFL (within functional limits)/Left LE Active ROM was WFL (within functional limits)/Right LE Active ROM was WFL (within functional limits)

## 2020-01-31 NOTE — PHYSICAL THERAPY INITIAL EVALUATION ADULT - PERTINENT HX OF CURRENT PROBLEM, REHAB EVAL
Pt admitted to  secondary to recurrent DVT left LE. INR- 1.29, US doppler left LE- occlusion thrombus of the left common femoral, femoral, and popliteal veins.

## 2020-01-31 NOTE — CONSULT NOTE ADULT - PROBLEM SELECTOR RECOMMENDATION 9
provoked  Following spine surgery  Is unclear whether this DVTs progressive or persistent in absence of previous records  Patient is currently being treated with intravenous heparin and warfarin which is reasonable.  Hypercoagulable workup is not indicated in absence of previous DVT and current provoked DVT.

## 2020-02-01 LAB
APPEARANCE UR: CLEAR — SIGNIFICANT CHANGE UP
BILIRUB UR-MCNC: NEGATIVE — SIGNIFICANT CHANGE UP
COLOR SPEC: YELLOW — SIGNIFICANT CHANGE UP
DIFF PNL FLD: ABNORMAL
GLUCOSE UR QL: NEGATIVE MG/DL — SIGNIFICANT CHANGE UP
INR BLD: 1.42 RATIO — HIGH (ref 0.88–1.16)
KETONES UR-MCNC: ABNORMAL
LEUKOCYTE ESTERASE UR-ACNC: NEGATIVE — SIGNIFICANT CHANGE UP
NITRITE UR-MCNC: NEGATIVE — SIGNIFICANT CHANGE UP
PH UR: 7 — SIGNIFICANT CHANGE UP (ref 5–8)
PROT UR-MCNC: NEGATIVE MG/DL — SIGNIFICANT CHANGE UP
PROTHROM AB SERPL-ACNC: 15.9 SEC — HIGH (ref 10–12.9)
SP GR SPEC: 1.01 — SIGNIFICANT CHANGE UP (ref 1.01–1.02)
UROBILINOGEN FLD QL: NEGATIVE MG/DL — SIGNIFICANT CHANGE UP

## 2020-02-01 PROCEDURE — 99232 SBSQ HOSP IP/OBS MODERATE 35: CPT

## 2020-02-01 RX ORDER — ALPRAZOLAM 0.25 MG
0.5 TABLET ORAL
Refills: 0 | Status: DISCONTINUED | OUTPATIENT
Start: 2020-02-01 | End: 2020-02-04

## 2020-02-01 RX ORDER — WARFARIN SODIUM 2.5 MG/1
7 TABLET ORAL DAILY
Refills: 0 | Status: DISCONTINUED | OUTPATIENT
Start: 2020-02-01 | End: 2020-02-02

## 2020-02-01 RX ADMIN — Medication 325 MILLIGRAM(S): at 17:05

## 2020-02-01 RX ADMIN — Medication 325 MILLIGRAM(S): at 17:03

## 2020-02-01 RX ADMIN — WARFARIN SODIUM 7 MILLIGRAM(S): 2.5 TABLET ORAL at 21:55

## 2020-02-01 RX ADMIN — Medication 0.5 MILLIGRAM(S): at 05:16

## 2020-02-01 RX ADMIN — ENOXAPARIN SODIUM 60 MILLIGRAM(S): 100 INJECTION SUBCUTANEOUS at 17:03

## 2020-02-01 RX ADMIN — Medication 325 MILLIGRAM(S): at 05:16

## 2020-02-01 RX ADMIN — Medication 325 MILLIGRAM(S): at 10:15

## 2020-02-01 RX ADMIN — GABAPENTIN 100 MILLIGRAM(S): 400 CAPSULE ORAL at 17:03

## 2020-02-01 RX ADMIN — POLYETHYLENE GLYCOL 3350 17 GRAM(S): 17 POWDER, FOR SOLUTION ORAL at 10:02

## 2020-02-01 RX ADMIN — Medication 325 MILLIGRAM(S): at 10:01

## 2020-02-01 RX ADMIN — ENOXAPARIN SODIUM 60 MILLIGRAM(S): 100 INJECTION SUBCUTANEOUS at 05:17

## 2020-02-01 RX ADMIN — Medication 0.5 MILLIGRAM(S): at 14:18

## 2020-02-01 RX ADMIN — Medication 1000 UNIT(S): at 10:01

## 2020-02-01 RX ADMIN — Medication 0.5 MILLIGRAM(S): at 21:54

## 2020-02-01 RX ADMIN — LIDOCAINE 1 PATCH: 4 CREAM TOPICAL at 02:54

## 2020-02-01 RX ADMIN — GABAPENTIN 100 MILLIGRAM(S): 400 CAPSULE ORAL at 05:17

## 2020-02-01 NOTE — PROGRESS NOTE ADULT - SUBJECTIVE AND OBJECTIVE BOX
73 yo F with a PMH recently diagnosed DVT, GERD, anxiety, osteoporosis, and hepatic cysts who presents with LLE swelling. She was diagnosed with a LLE DVT about one month ago (she is unsure how extensive it was), post-op from lumbar spinal surgery at [facility] in [place]. She was placed on Xarelto and subsequently sent to [facility]. Since then, she feels the pain has markedly improved overall (but still is intermittent), and the LLE swelling has improved slightly, but not much. She states she is able to walk, and she has not had falls recently.  Pt also  had surgery about 1-2 months ago for a T10, T12, and L2 compression fracture at [facility]. She denied any trauma.    no change in her status      Vital Signs Last 24 Hrs  T(C): 36.2 (01 Feb 2020 11:18), Max: 36.5 (01 Feb 2020 05:20)  T(F): 97.1 (01 Feb 2020 11:18), Max: 97.7 (01 Feb 2020 05:20)  HR: 79 (01 Feb 2020 11:18) (70 - 79)  BP: 117/65 (01 Feb 2020 11:18) (111/58 - 117/65)  BP(mean): --  RR: 16 (01 Feb 2020 11:18) (16 - 19)  SpO2: 97% (01 Feb 2020 11:18) (97% - 97%)    Neurological: A& O x 3    Skin: Warm    Respiratory and Thorax: normal effort; Breath sounds: normal; No rales/wheezing/rhonchi  	  Cardiovascular: S1, S2, regular, NMBR	    Gastrointestinal: BS + x 4Q, nontender	    Genitourinary:  Bladder nondistended, nontender    Musculoskeletal:   General Right:   no muscle/joint tenderness,   normal tone, no joint swelling,   ROM: full	    General Left:   no muscle/joint tenderness,   normal tone, no joint swelling,   ROM: full                                10.0   6.94  )-----------( 203      ( 30 Jan 2020 04:35 )             30.1       01-30    141  |  112<H>  |  18  ----------------------------<  96  3.5   |  23  |  0.68    Ca    8.3<L>      30 Jan 2020 04:35  Phos  3.3     01-30  Mg     2.0     01-30    TPro  6.6  /  Alb  3.4  /  TBili  0.5  /  DBili  x   /  AST  14<L>  /  ALT  24  /  AlkPhos  121<H>  01-29      PT/INR - ( 29 Jan 2020 20:38 )   PT: 20.4 sec;   INR: 1.80 ratio         PTT - ( 30 Jan 2020 04:35 )  PTT:> 200 sec				  < from: US Duplex Venous Lower Ext Ltd, Left (01.29.20 @ 22:18) >  IMPRESSION:     occlusive thrombus of the left common femoral, femoral and popliteal veins

## 2020-02-01 NOTE — PROGRESS NOTE ADULT - ASSESSMENT
This is a 74 year old female  with a PMH recently diagnosed DVT, GERD, anxiety, osteoporosis, and hepatic cysts who was sent from [facility] for evaluation of LLE swelling. She was diagnosed with a LLE DVT about one month ago (she is unsure how extensive it was), post-op from lumbar spinal surgery at [facility] in [place]. She was placed on Xarelto and subsequently sent to [facility].     DVT  bridge LMWH and vka  leg looks and feels better  c/w present regimen    v anxious d/w cousin, she is at [facility]

## 2020-02-02 LAB
HCT VFR BLD CALC: 35 % — SIGNIFICANT CHANGE UP (ref 34.5–45)
HGB BLD-MCNC: 11.4 G/DL — LOW (ref 11.5–15.5)
INR BLD: 1.86 RATIO — HIGH (ref 0.88–1.16)
MCHC RBC-ENTMCNC: 30.5 PG — SIGNIFICANT CHANGE UP (ref 27–34)
MCHC RBC-ENTMCNC: 32.6 GM/DL — SIGNIFICANT CHANGE UP (ref 32–36)
MCV RBC AUTO: 93.6 FL — SIGNIFICANT CHANGE UP (ref 80–100)
PLATELET # BLD AUTO: 225 K/UL — SIGNIFICANT CHANGE UP (ref 150–400)
PROTHROM AB SERPL-ACNC: 21.1 SEC — HIGH (ref 10–12.9)
RBC # BLD: 3.74 M/UL — LOW (ref 3.8–5.2)
RBC # FLD: 15.2 % — HIGH (ref 10.3–14.5)
WBC # BLD: 5.1 K/UL — SIGNIFICANT CHANGE UP (ref 3.8–10.5)
WBC # FLD AUTO: 5.1 K/UL — SIGNIFICANT CHANGE UP (ref 3.8–10.5)

## 2020-02-02 PROCEDURE — 99232 SBSQ HOSP IP/OBS MODERATE 35: CPT

## 2020-02-02 RX ORDER — OXYBUTYNIN CHLORIDE 5 MG
5 TABLET ORAL DAILY
Refills: 0 | Status: DISCONTINUED | OUTPATIENT
Start: 2020-02-02 | End: 2020-02-04

## 2020-02-02 RX ORDER — WARFARIN SODIUM 2.5 MG/1
5 TABLET ORAL DAILY
Refills: 0 | Status: DISCONTINUED | OUTPATIENT
Start: 2020-02-02 | End: 2020-02-03

## 2020-02-02 RX ADMIN — Medication 0.5 MILLIGRAM(S): at 10:50

## 2020-02-02 RX ADMIN — Medication 325 MILLIGRAM(S): at 05:56

## 2020-02-02 RX ADMIN — Medication 325 MILLIGRAM(S): at 05:52

## 2020-02-02 RX ADMIN — GABAPENTIN 100 MILLIGRAM(S): 400 CAPSULE ORAL at 05:55

## 2020-02-02 RX ADMIN — Medication 5 MILLIGRAM(S): at 13:43

## 2020-02-02 RX ADMIN — ENOXAPARIN SODIUM 60 MILLIGRAM(S): 100 INJECTION SUBCUTANEOUS at 05:55

## 2020-02-02 RX ADMIN — GABAPENTIN 100 MILLIGRAM(S): 400 CAPSULE ORAL at 17:01

## 2020-02-02 RX ADMIN — WARFARIN SODIUM 5 MILLIGRAM(S): 2.5 TABLET ORAL at 21:09

## 2020-02-02 RX ADMIN — Medication 325 MILLIGRAM(S): at 17:04

## 2020-02-02 RX ADMIN — Medication 325 MILLIGRAM(S): at 10:50

## 2020-02-02 RX ADMIN — POLYETHYLENE GLYCOL 3350 17 GRAM(S): 17 POWDER, FOR SOLUTION ORAL at 10:51

## 2020-02-02 RX ADMIN — Medication 1000 UNIT(S): at 10:50

## 2020-02-02 RX ADMIN — Medication 325 MILLIGRAM(S): at 10:52

## 2020-02-02 RX ADMIN — ENOXAPARIN SODIUM 60 MILLIGRAM(S): 100 INJECTION SUBCUTANEOUS at 17:02

## 2020-02-02 RX ADMIN — Medication 0.5 MILLIGRAM(S): at 17:01

## 2020-02-02 RX ADMIN — Medication 325 MILLIGRAM(S): at 17:02

## 2020-02-02 NOTE — PROGRESS NOTE ADULT - SUBJECTIVE AND OBJECTIVE BOX
75 yo F with a PMH recently diagnosed DVT, GERD, anxiety, osteoporosis, and hepatic cysts who presents with LLE swelling. She was diagnosed with a LLE DVT about one month ago (she is unsure how extensive it was), post-op from lumbar spinal surgery at [facility] in [place]. She was placed on Xarelto and subsequently sent to [facility]. Since then, she feels the pain has markedly improved overall (but still is intermittent), and the LLE swelling has improved slightly, but not much. She states she is able to walk, and she has not had falls recently.  Pt also  had surgery about 1-2 months ago for a T10, T12, and L2 compression fracture at [facility]. She denied any trauma.    no change in her status    Vital Signs Last 24 Hrs  T(C): 36.5 (02 Feb 2020 05:43), Max: 37.2 (01 Feb 2020 16:40)  T(F): 97.7 (02 Feb 2020 05:43), Max: 99 (01 Feb 2020 16:40)  HR: 76 (02 Feb 2020 05:43) (75 - 76)  BP: 115/65 (02 Feb 2020 05:43) (110/54 - 115/65)  BP(mean): --  RR: 18 (02 Feb 2020 05:43) (18 - 18)  SpO2: 97% (02 Feb 2020 05:43) (97% - 97%)  Neurological: A& O x 3    Skin: Warm    Respiratory and Thorax: normal effort; Breath sounds: normal; No rales/wheezing/rhonchi  	  Cardiovascular: S1, S2, regular, NMBR	    Gastrointestinal: BS + x 4Q, nontender	    Genitourinary:  Bladder nondistended, nontender    Musculoskeletal:   General Right:   no muscle/joint tenderness,   normal tone, no joint swelling,   ROM: full	    General Left:   no muscle/joint tenderness,   normal tone, no joint swelling,   ROM: full                                10.0   6.94  )-----------( 203      ( 30 Jan 2020 04:35 )             30.1       01-30    141  |  112<H>  |  18  ----------------------------<  96  3.5   |  23  |  0.68    Ca    8.3<L>      30 Jan 2020 04:35  Phos  3.3     01-30  Mg     2.0     01-30    TPro  6.6  /  Alb  3.4  /  TBili  0.5  /  DBili  x   /  AST  14<L>  /  ALT  24  /  AlkPhos  121<H>  01-29      PT/INR - ( 29 Jan 2020 20:38 )   PT: 20.4 sec;   INR: 1.80 ratio         PTT - ( 30 Jan 2020 04:35 )  PTT:> 200 sec				  < from: US Duplex Venous Lower Ext Ltd, Left (01.29.20 @ 22:18) >  IMPRESSION:     occlusive thrombus of the left common femoral, femoral and popliteal veins

## 2020-02-02 NOTE — PROGRESS NOTE ADULT - ASSESSMENT
This is a 74 year old female  with a PMH recently diagnosed DVT, GERD, anxiety, osteoporosis, and hepatic cysts who was sent from [facility] for evaluation of LLE swelling. She was diagnosed with a LLE DVT about one month ago (she is unsure how extensive it was), post-op from lumbar spinal surgery at [facility] in [place]. She was placed on Xarelto and subsequently sent to [facility].     DVT  bridge LMWH and vka  leg looks and feels better  c/w present regimen    Anxiety d[facility]w [facility]

## 2020-02-03 LAB
INR BLD: 2.93 RATIO — HIGH (ref 0.88–1.16)
PROTHROM AB SERPL-ACNC: 33.6 SEC — HIGH (ref 10–12.9)

## 2020-02-03 PROCEDURE — 99232 SBSQ HOSP IP/OBS MODERATE 35: CPT

## 2020-02-03 RX ADMIN — Medication 325 MILLIGRAM(S): at 11:17

## 2020-02-03 RX ADMIN — GABAPENTIN 100 MILLIGRAM(S): 400 CAPSULE ORAL at 05:43

## 2020-02-03 RX ADMIN — Medication 0.5 MILLIGRAM(S): at 11:18

## 2020-02-03 RX ADMIN — Medication 5 MILLIGRAM(S): at 11:17

## 2020-02-03 RX ADMIN — Medication 0.5 MILLIGRAM(S): at 23:19

## 2020-02-03 RX ADMIN — Medication 325 MILLIGRAM(S): at 23:19

## 2020-02-03 RX ADMIN — ENOXAPARIN SODIUM 60 MILLIGRAM(S): 100 INJECTION SUBCUTANEOUS at 17:31

## 2020-02-03 RX ADMIN — Medication 325 MILLIGRAM(S): at 12:00

## 2020-02-03 RX ADMIN — Medication 325 MILLIGRAM(S): at 05:43

## 2020-02-03 RX ADMIN — Medication 1000 UNIT(S): at 11:17

## 2020-02-03 RX ADMIN — GABAPENTIN 100 MILLIGRAM(S): 400 CAPSULE ORAL at 17:31

## 2020-02-03 RX ADMIN — Medication 325 MILLIGRAM(S): at 17:31

## 2020-02-03 RX ADMIN — Medication 325 MILLIGRAM(S): at 18:34

## 2020-02-03 RX ADMIN — ENOXAPARIN SODIUM 60 MILLIGRAM(S): 100 INJECTION SUBCUTANEOUS at 05:44

## 2020-02-03 NOTE — PROGRESS NOTE ADULT - ASSESSMENT
This is a 74 year old female  with a PMH recently diagnosed DVT, GERD, anxiety, osteoporosis, and hepatic cysts who was sent from [facility] for evaluation of LLE swelling. She was diagnosed with a LLE DVT about one month ago (she is unsure how extensive it was), post-op from lumbar spinal surgery at [facility] in [place]. She was placed on Xarelto and subsequently sent to [facility].     DVT  bridge LMWH and vka  leg looks and feels better  c/w present regimen but dc VKA for now    Anxiety d/w cousin, [facility]

## 2020-02-03 NOTE — PROGRESS NOTE ADULT - SUBJECTIVE AND OBJECTIVE BOX
75 yo F with a PMH recently diagnosed DVT, GERD, anxiety, osteoporosis, and hepatic cysts who presents with LLE swelling. She was diagnosed with a LLE DVT about one month ago (she is unsure how extensive it was), post-op from lumbar spinal surgery at [facility] in [place]. She was placed on Xarelto and subsequently sent to [facility]. Since then, she feels the pain has markedly improved overall (but still is intermittent), and the LLE swelling has improved slightly, but not much. She states she is able to walk, and she has not had falls recently.  Pt also  had surgery about 1-2 months ago for a T10, T12, and L2 compression fracture at [facility]. She denied any trauma.    no change in her status    Vital Signs Last 24 Hrs  T(C): 36.9 (03 Feb 2020 11:12), Max: 36.9 (02 Feb 2020 21:08)  T(F): 98.5 (03 Feb 2020 11:12), Max: 98.5 (02 Feb 2020 21:08)  HR: 71 (03 Feb 2020 11:12) (69 - 84)  BP: 108/915 (03 Feb 2020 11:12) (105/55 - 112/81)  BP(mean): --  RR: 18 (03 Feb 2020 11:12) (17 - 20)  SpO2: 95% (03 Feb 2020 11:12) (95% - 100%)3    Skin: Warm    Respiratory and Thorax: normal effort; Breath sounds: normal; No rales/wheezing/rhonchi  	  Cardiovascular: S1, S2, regular, NMBR	    Gastrointestinal: BS + x 4Q, nontender	    Genitourinary:  Bladder nondistended, nontender    Musculoskeletal:   General Right:   no muscle/joint tenderness,   normal tone, no joint swelling,   ROM: full	    General Left:   no muscle/joint tenderness,   normal tone, no joint swelling,   ROM: full                                10.0   6.94  )-----------( 203      ( 30 Jan 2020 04:35 )             30.1       01-30    141  |  112<H>  |  18  ----------------------------<  96  3.5   |  23  |  0.68    Ca    8.3<L>      30 Jan 2020 04:35  Phos  3.3     01-30  Mg     2.0     01-30    TPro  6.6  /  Alb  3.4  /  TBili  0.5  /  DBili  x   /  AST  14<L>  /  ALT  24  /  AlkPhos  121<H>  01-29      PT/INR - ( 29 Jan 2020 20:38 )   PT: 20.4 sec;   INR: 1.80 ratio         PTT - ( 30 Jan 2020 04:35 )  PTT:> 200 sec				  < from: US Duplex Venous Lower Ext Ltd, Left (01.29.20 @ 22:18) >  IMPRESSION:     occlusive thrombus of the left common femoral, femoral and popliteal veins

## 2020-02-04 ENCOUNTER — TRANSCRIPTION ENCOUNTER (OUTPATIENT)
Age: 75
End: 2020-02-04

## 2020-02-04 VITALS
SYSTOLIC BLOOD PRESSURE: 108 MMHG | RESPIRATION RATE: 17 BRPM | DIASTOLIC BLOOD PRESSURE: 59 MMHG | HEART RATE: 66 BPM | OXYGEN SATURATION: 97 % | TEMPERATURE: 98 F

## 2020-02-04 LAB
INR BLD: 2.32 RATIO — HIGH (ref 0.88–1.16)
PROTHROM AB SERPL-ACNC: 26.4 SEC — HIGH (ref 10–12.9)

## 2020-02-04 PROCEDURE — 99239 HOSP IP/OBS DSCHRG MGMT >30: CPT

## 2020-02-04 RX ORDER — WARFARIN SODIUM 2.5 MG/1
1 TABLET ORAL
Qty: 30 | Refills: 0
Start: 2020-02-04 | End: 2020-03-04

## 2020-02-04 RX ORDER — OXYBUTYNIN CHLORIDE 5 MG
5 TABLET ORAL
Refills: 0 | Status: DISCONTINUED | OUTPATIENT
Start: 2020-02-04 | End: 2020-02-04

## 2020-02-04 RX ORDER — WARFARIN SODIUM 2.5 MG/1
4 TABLET ORAL AT BEDTIME
Refills: 0 | Status: DISCONTINUED | OUTPATIENT
Start: 2020-02-04 | End: 2020-02-04

## 2020-02-04 RX ORDER — RIVAROXABAN 15 MG-20MG
1 KIT ORAL
Qty: 0 | Refills: 0 | DISCHARGE

## 2020-02-04 RX ORDER — OXYBUTYNIN CHLORIDE 5 MG
1 TABLET ORAL
Qty: 60 | Refills: 0
Start: 2020-02-04 | End: 2020-03-04

## 2020-02-04 RX ADMIN — Medication 325 MILLIGRAM(S): at 06:24

## 2020-02-04 RX ADMIN — Medication 325 MILLIGRAM(S): at 12:58

## 2020-02-04 RX ADMIN — Medication 325 MILLIGRAM(S): at 11:58

## 2020-02-04 RX ADMIN — Medication 5 MILLIGRAM(S): at 10:13

## 2020-02-04 RX ADMIN — Medication 0.5 MILLIGRAM(S): at 10:20

## 2020-02-04 RX ADMIN — ENOXAPARIN SODIUM 60 MILLIGRAM(S): 100 INJECTION SUBCUTANEOUS at 10:13

## 2020-02-04 RX ADMIN — GABAPENTIN 100 MILLIGRAM(S): 400 CAPSULE ORAL at 10:13

## 2020-02-04 RX ADMIN — POLYETHYLENE GLYCOL 3350 17 GRAM(S): 17 POWDER, FOR SOLUTION ORAL at 10:11

## 2020-02-04 RX ADMIN — Medication 1000 UNIT(S): at 10:13

## 2020-02-04 NOTE — DISCHARGE NOTE PROVIDER - CARE PROVIDER_API CALL
Roosevelt Villasenor)  Family Medicine  [address] [place] [address]  Phone: [phone]  Fax: [phone]  Follow Up Time:

## 2020-02-04 NOTE — DISCHARGE NOTE PROVIDER - NSDCMRMEDTOKEN_GEN_ALL_CORE_FT
gabapentin 100 mg oral capsule: 1 cap(s) orally 2 times a day  Mylanta oral suspension: 30 milliliter(s) orally once a day, As Needed  oxybutynin 5 mg oral tablet: 1 tab(s) orally 2 times a day  pantoprazole 40 mg oral delayed release tablet: 1 tab(s) orally once a day  polyethylene glycol 3350 oral powder for reconstitution: 17 gram(s) orally once a day  Tylenol 325 mg oral tablet: 1 tab(s) orally every 6 hours  Vitamin D3 1000 intl units (25 mcg) oral tablet: 1 tab(s) orally once a day  warfarin 4 mg oral tablet: 1 tab(s) orally once a day (at bedtime)  Xanax 0.5 mg oral tablet: 1 tab(s) orally 2 times a day

## 2020-02-04 NOTE — DISCHARGE NOTE PROVIDER - NSDCCPCAREPLAN_GEN_ALL_CORE_FT
PRINCIPAL DISCHARGE DIAGNOSIS  Diagnosis: DVT (deep venous thrombosis)  Assessment and Plan of Treatment: coumadin for now

## 2020-02-04 NOTE — DISCHARGE NOTE PROVIDER - HOSPITAL COURSE
73 yo F with a PMH recently diagnosed DVT, GERD, anxiety, osteoporosis, and hepatic cysts who presents with LLE swelling. She was diagnosed with a LLE DVT about one month ago (she is unsure how extensive it was), post-op from lumbar spinal surgery at [facility] in [place]. She was placed on Xarelto and subsequently sent to [facility]. Since then, she feels the pain has markedly improved overall (but still is intermittent), and the LLE swelling has improved slightly, but not much. She states she is able to walk, and she has not had falls recently.    Pt also  had surgery about 1-2 months ago for a T10, T12, and L2 compression fracture at [facility]. She denied any trauma.        no change in her status        Vital Signs Last 24 Hrs    T(C): 36.9 (03 Feb 2020 11:12), Max: 36.9 (02 Feb 2020 21:08)    T(F): 98.5 (03 Feb 2020 11:12), Max: 98.5 (02 Feb 2020 21:08)    HR: 71 (03 Feb 2020 11:12) (69 - 84)    BP: 108/915 (03 Feb 2020 11:12) (105/55 - 112/81)    BP(mean): --    RR: 18 (03 Feb 2020 11:12) (17 - 20)    SpO2: 95% (03 Feb 2020 11:12) (95% - 100%)3        Skin: Warm        Respiratory and Thorax: normal effort; Breath sounds: normal; No rales/wheezing/rhonchi    	    Cardiovascular: S1, S2, regular, NMBR	        Gastrointestinal: BS + x 4Q, nontender	        Genitourinary:  Bladder nondistended, nontender        Musculoskeletal:     General Right:     no muscle/joint tenderness,     normal tone, no joint swelling,     ROM: full	        General Left:     no muscle/joint tenderness,     normal tone, no joint swelling,     ROM: full                                                    occlusive thrombus of the left common femoral, femoral and popliteal veins                Assessment and Plan:     · Assessment	    This is a 74 year old female  with a PMH recently diagnosed DVT, GERD, anxiety, osteoporosis, and hepatic cysts who was sent from [facility] rehab for evaluation of LLE swelling. She was diagnosed with a LLE DVT about one month ago (she is unsure how extensive it was), post-op from lumbar spinal surgery at [facility] in [place]. She was placed on Xarelto and subsequently sent to [facility].         DVT    VKA 4 mg and INR daily for 2 days    INR 2.3 today was on hold last night when INR was 2.9        Anxiety d/w cousin, she is at [facility]    xanax        ZE     ditropan            PCP Dr Villasenor aware

## 2020-02-04 NOTE — DISCHARGE NOTE NURSING/CASE MANAGEMENT/SOCIAL WORK - PATIENT PORTAL LINK FT
You can access the FollowMyHealth Patient Portal offered by [facility] by registering at the following website: [email or web address]. By joining [facility]’s FollowMyHealth portal, you will also be able to view your health information using other applications (apps) compatible with our system.

## 2020-02-06 DIAGNOSIS — M81.0 AGE-RELATED OSTEOPOROSIS WITHOUT CURRENT PATHOLOGICAL FRACTURE: ICD-10-CM

## 2020-02-06 DIAGNOSIS — I82.432 ACUTE EMBOLISM AND THROMBOSIS OF LEFT POPLITEAL VEIN: ICD-10-CM

## 2020-02-06 DIAGNOSIS — Z79.01 LONG TERM (CURRENT) USE OF ANTICOAGULANTS: ICD-10-CM

## 2020-02-06 DIAGNOSIS — K21.9 GASTRO-ESOPHAGEAL REFLUX DISEASE WITHOUT ESOPHAGITIS: ICD-10-CM

## 2020-02-06 DIAGNOSIS — K59.00 CONSTIPATION, UNSPECIFIED: ICD-10-CM

## 2020-02-06 DIAGNOSIS — I82.412 ACUTE EMBOLISM AND THROMBOSIS OF LEFT FEMORAL VEIN: ICD-10-CM

## 2020-02-06 DIAGNOSIS — Z91.013 ALLERGY TO SEAFOOD: ICD-10-CM

## 2020-02-06 DIAGNOSIS — K76.89 OTHER SPECIFIED DISEASES OF LIVER: ICD-10-CM

## 2020-02-06 DIAGNOSIS — F41.9 ANXIETY DISORDER, UNSPECIFIED: ICD-10-CM

## 2020-08-14 NOTE — PHYSICAL THERAPY INITIAL EVALUATION ADULT - DISCHARGE DISPOSITION, PT EVAL
----- Message from April Monge sent at 8/13/2020  5:05 PM EDT -----  Subject: Refill Request    QUESTIONS  Name of Medication? losartan (COZAAR) 50 MG tablet  Patient-reported dosage and instructions? 50 MG  How many days do you have left? 7  [facility]erred [facility]? [facility]  Pharmacy phone number (if available)? [phone]  Additional Information for Provider?   ---------------------------------------------------------------------------  --------------  [address]  What is the best way for the office to contact you? OK to leave message on   voicemail  Preferred Call Back Phone Number?  [phone]
JEOVANNY

## 2020-10-06 PROBLEM — F41.9 ANXIETY DISORDER, UNSPECIFIED: Chronic | Status: ACTIVE | Noted: 2020-01-30

## 2020-10-06 PROBLEM — K76.89 OTHER SPECIFIED DISEASES OF LIVER: Chronic | Status: ACTIVE | Noted: 2020-01-30

## 2020-10-06 PROBLEM — K21.9 GASTRO-ESOPHAGEAL REFLUX DISEASE WITHOUT ESOPHAGITIS: Chronic | Status: ACTIVE | Noted: 2020-01-30

## 2020-10-06 PROBLEM — M81.0 AGE-RELATED OSTEOPOROSIS WITHOUT CURRENT PATHOLOGICAL FRACTURE: Chronic | Status: ACTIVE | Noted: 2020-01-30

## 2020-10-15 ENCOUNTER — APPOINTMENT (OUTPATIENT)
Dept: MRI IMAGING | Facility: CLINIC | Age: 75
End: 2020-10-15
Payer: MEDICARE

## 2020-10-15 ENCOUNTER — OUTPATIENT (OUTPATIENT)
Dept: OUTPATIENT SERVICES | Facility: HOSPITAL | Age: 75
LOS: 1 days | End: 2020-10-15
Payer: MEDICARE

## 2020-10-15 DIAGNOSIS — Z00.8 ENCOUNTER FOR OTHER GENERAL EXAMINATION: ICD-10-CM

## 2020-10-15 DIAGNOSIS — Z98.890 OTHER SPECIFIED POSTPROCEDURAL STATES: Chronic | ICD-10-CM

## 2020-10-15 PROCEDURE — 72146 MRI CHEST SPINE W/O DYE: CPT

## 2020-10-15 PROCEDURE — 72148 MRI LUMBAR SPINE W/O DYE: CPT | Mod: 26

## 2020-10-15 PROCEDURE — 72148 MRI LUMBAR SPINE W/O DYE: CPT

## 2020-10-15 PROCEDURE — 72146 MRI CHEST SPINE W/O DYE: CPT | Mod: 26

## 2020-10-21 PROBLEM — Z00.00 ENCOUNTER FOR PREVENTIVE HEALTH EXAMINATION: Status: ACTIVE | Noted: 2020-10-21

## 2020-10-23 ENCOUNTER — RESULT REVIEW (OUTPATIENT)
Age: 75
End: 2020-10-23

## 2020-10-23 ENCOUNTER — OUTPATIENT (OUTPATIENT)
Dept: OUTPATIENT SERVICES | Facility: HOSPITAL | Age: 75
LOS: 1 days | End: 2020-10-23
Payer: MEDICARE

## 2020-10-23 ENCOUNTER — APPOINTMENT (OUTPATIENT)
Dept: RADIOLOGY | Facility: CLINIC | Age: 75
End: 2020-10-23
Payer: MEDICARE

## 2020-10-23 DIAGNOSIS — M80.00XA AGE-RELATED OSTEOPOROSIS WITH CURRENT PATHOLOGICAL FRACTURE, UNSPECIFIED SITE, INITIAL ENCOUNTER FOR FRACTURE: ICD-10-CM

## 2020-10-23 DIAGNOSIS — Z98.890 OTHER SPECIFIED POSTPROCEDURAL STATES: Chronic | ICD-10-CM

## 2020-10-23 DIAGNOSIS — Z98.890 OTHER SPECIFIED POSTPROCEDURAL STATES: ICD-10-CM

## 2020-10-23 DIAGNOSIS — S22.070A WEDGE COMPRESSION FRACTURE OF T9-T10 VERTEBRA, INITIAL ENCOUNTER FOR CLOSED FRACTURE: ICD-10-CM

## 2020-10-23 DIAGNOSIS — M48.061 SPINAL STENOSIS, LUMBAR REGION WITHOUT NEUROGENIC CLAUDICATION: ICD-10-CM

## 2020-10-23 DIAGNOSIS — S22.080A WEDGE COMPRESSION FRACTURE OF T11-T12 VERTEBRA, INITIAL ENCOUNTER FOR CLOSED FRACTURE: ICD-10-CM

## 2020-10-23 PROCEDURE — 77081 DXA BONE DENSITY APPENDICULR: CPT

## 2020-10-23 PROCEDURE — 77080 DXA BONE DENSITY AXIAL: CPT | Mod: 26

## 2020-10-23 PROCEDURE — 77080 DXA BONE DENSITY AXIAL: CPT

## 2020-11-06 ENCOUNTER — TRANSCRIPTION ENCOUNTER (OUTPATIENT)
Age: 75
End: 2020-11-06

## 2021-06-15 NOTE — ED PROVIDER NOTE - CADM POA URETHRAL CATHETER
[facility] EMERGENCY DEPARTMENT ([facility])   Mis 15, 2021  History     Chief Complaint   Patient presents with     Headache     HPI  Leticia Morales is a 35 year old female with history of prior H1 N1 influenza with prolonged ICU stay and subsequent chronic migraine headaches who presents with persistent headache.  Patient has had intermittent migraine headaches, she is on sumatriptan for this.  He developed flare of her current migraine on Friday.  She was unable to get to her sumatriptan until Sunday, and it only briefly improved symptoms.  Patient did get seen in clinic today for her current migraine flare and was given Toradol.  She did not have any improvement with this and so presents for evaluation. Reports aura in the left eye that is now resolved, but a lingering feeling of pressure in the left eye.   For the last few days she has also had vomiting with extensive esophageal reflux and a sensation of difficulty swallowing.     Hx of past trach and g tube, now currently without from both.    Per Epic record, she has been referred to neurology for consideration of Aimovig.    PAST MEDICAL HISTORY:   Past Medical History:   Diagnosis Date     ARDS (adult respiratory distress syndrome) (H) 2010    related to H1N1 infection     H1N1 influenza 2010    prolonged ICU stay, medically induced coma     Iron deficiency anemia      Migraines        PAST SURGICAL HISTORY:   Past Surgical History:   Procedure Laterality Date     THORACIC SURGERY      trach       Past medical history, past surgical history, medications, and allergies were reviewed with the patient. Additional pertinent items: None    FAMILY HISTORY:   Family History   Problem Relation Age of Onset     Diabetes Father      Hypertension Father      Cancer Father        SOCIAL HISTORY:   Social History     Tobacco Use     Smoking status: Former Smoker     Types: Hookah     Smokeless tobacco: Never Used   Substance Use Topics     Alcohol use:  No     Social history was reviewed with the patient. Additional pertinent items: None         Allergies   Allergen Reactions     Macrodantin [Nitrofurantoin Macrocrystal] Other (See Comments)     itching        Review of Systems  A complete review of systems was performed with pertinent positives and negatives noted in the HPI, and all other systems negative.    Physical Exam   BP: 122/84  Pulse: 81  Temp: 98  F (36.7  C)  Resp: 18  SpO2: 100 %      Physical Exam   Gen:A&Ox3, uncomfortable, photophobia  HEENT:PERRL, no facial tenderness or wounds, head atraumatic, oropharynx clear, mucous membranes moist, TMs clear bilaterally  Neck:no bony tenderness or step offs, no JVD, trachea midline  Back: no CVA tenderness, no midline bony tenderness  CV:RRR without murmurs, no crepitus  PULM:Clear to auscultation bilaterally  Abd:soft, mildly distended, hyperactive bowel sounds  UE:No traumatic injuries, skin normal  LE:no traumatic injuries, skin normal, no LE edema  Neuro:CN II-XII intact, strength 5/5 throughout, gait stable  Skin: no rashes or ecchymoses    ED Course        Procedures             EKG Interpretation:      Interpreted by Taylor Toscano MD  Time reviewed: 2:04pm  Symptoms at time of EKG: dysphagia  Rhythm: normal sinus   Rate: normal  Axis: normal  Ectopy: none  Conduction: normal  ST Segments/ T Waves: No ST-T wave changes  Q Waves: none  Comparison to prior: No old EKG available    Clinical Impression: normal EKG       Results for orders placed or performed during the hospital encounter of 06/15/21 (from the past 24 hour(s))   EKG 12 lead   Result Value Ref Range    Interpretation ECG Click View Image link to view waveform and result    CBC with platelets differential   Result Value Ref Range    WBC 4.8 4.0 - 11.0 10e9/L    RBC Count 4.79 3.8 - 5.2 10e12/L    Hemoglobin 11.9 11.7 - 15.7 g/dL    Hematocrit 37.6 35.0 - 47.0 %    MCV 79 78 - 100 fl    MCH 24.8 (L) 26.5 - 33.0 pg    MCHC 31.6 31.5 - 36.5  g/dL    RDW 15.2 (H) 10.0 - 15.0 %    Platelet Count 311 150 - 450 10e9/L    Diff Method Automated Method     % Neutrophils 44.7 %    % Lymphocytes 35.1 %    % Monocytes 12.8 %    % Eosinophils 6.2 %    % Basophils 0.8 %    % Immature Granulocytes 0.4 %    Nucleated RBCs 0 0 /100    Absolute Neutrophil 2.2 1.6 - 8.3 10e9/L    Absolute Lymphocytes 1.7 0.8 - 5.3 10e9/L    Absolute Monocytes 0.6 0.0 - 1.3 10e9/L    Absolute Eosinophils 0.3 0.0 - 0.7 10e9/L    Absolute Basophils 0.0 0.0 - 0.2 10e9/L    Abs Immature Granulocytes 0.0 0 - 0.4 10e9/L    Absolute Nucleated RBC 0.0    Comprehensive metabolic panel   Result Value Ref Range    Sodium 141 133 - 144 mmol/L    Potassium 3.7 3.4 - 5.3 mmol/L    Chloride 110 (H) 94 - 109 mmol/L    Carbon Dioxide 25 20 - 32 mmol/L    Anion Gap 6 3 - 14 mmol/L    Glucose 80 70 - 99 mg/dL    Urea Nitrogen 10 7 - 30 mg/dL    Creatinine 0.94 0.52 - 1.04 mg/dL    GFR Estimate 79 >60 mL/min/[1.73_m2]    GFR Estimate If Black >90 >60 mL/min/[1.73_m2]    Calcium 8.9 8.5 - 10.1 mg/dL    Bilirubin Total 0.4 0.2 - 1.3 mg/dL    Albumin 3.6 3.4 - 5.0 g/dL    Protein Total 7.6 6.8 - 8.8 g/dL    Alkaline Phosphatase 59 40 - 150 U/L    ALT 14 0 - 50 U/L    AST 14 0 - 45 U/L   Lipase   Result Value Ref Range    Lipase 160 73 - 393 U/L   HCG qualitative pregnancy (blood)   Result Value Ref Range    HCG Qualitative Serum Negative NEG^Negative   CT Chest/Abdomen/Pelvis w Contrast    Narrative    CT CHEST/ABDOMEN/PELVIS W CONTRAST 6/15/2021 3:18 PM    CLINICAL HISTORY: Nausea, vomiting, difficulty swallowing    TECHNIQUE: CT scan of the chest, abdomen, and pelvis was performed  following injection of IV contrast. Multiplanar reformats were  obtained. Dose reduction techniques were used.   CONTRAST: 62 Isovue 370    COMPARISON: CT 11/22/2011    FINDINGS:   LUNGS AND PLEURA: The central airways are clear. Mild right lower lobe  atelectasis versus scarring. No focal consolidation or pleural  effusion. No  mass or suspicious nodule.    MEDIASTINUM/AXILLAE: No thoracic adenopathy. Normal heart size and no  pericardial effusion.    HEPATOBILIARY: Hepatic steatosis. Multiple small hypodense hepatic  lesions, likely cysts or biliary hamartomas. Normal gallbladder and  bile ducts.    PANCREAS: Normal.    SPLEEN: Tiny upper cyst. No follow-up is needed.    ADRENAL GLANDS: Normal.    KIDNEYS/BLADDER: Multiple bilateral renal cysts including a dominant  left-sided cyst measuring 3.3 cm. No follow-up is needed. No  hydronephrosis.    BOWEL: Normal caliber small bowel. Normal appendix. Mild sigmoid  diverticulosis without evidence of acute diverticulitis. No free air  or free fluid.    PELVIC ORGANS: Normal.    ADDITIONAL FINDINGS: None.    MUSCULOSKELETAL: Normal.      Impression    IMPRESSION:  1.  No acute findings in the chest, abdomen or pelvis. No evidence of  a bowel obstruction or inflammatory process.    MASHA WATT MD   Head CT w/o contrast    Narrative    CT OF THE HEAD WITHOUT CONTRAST 6/15/2021 3:18 PM     COMPARISON: None.    HISTORY:  Altered mental status.    TECHNIQUE: Axial CT images of the head from the skull base to the  vertex were acquired without IV contrast.    FINDINGS: The ventricles and basal cisterns are within normal limits  in configuration. There is no midline shift. There are no extra-axial  fluid collections. Gray-white differentiation is well maintained.    No intracranial hemorrhage, mass or recent infarct.    The visualized paranasal sinuses are well-aerated. There is no  mastoiditis. There are no fractures of the visualized bones.      Impression    IMPRESSION:  Normal head CT.      Radiation dose for this scan was reduced using automated exposure  control, adjustment of the mA and/or kV according to patient size, or  iterative reconstruction technique    GÓMEZ ORTIZ MD     Medications   0.9% sodium chloride BOLUS (0 mLs Intravenous Stopped 6/15/21 1614)   prochlorperazine  (COMPAZINE) injection 10 mg (10 mg Intravenous Given 6/15/21 1529)   diphenhydrAMINE (BENADRYL) injection 12.5 mg (12.5 mg Intravenous Given 6/15/21 1524)   dexamethasone PF (DECADRON) injection 10 mg (10 mg Intravenous Given 6/15/21 1522)   iopamidol (ISOVUE-370) solution 100 mL (62 mLs Intravenous Given 6/15/21 1504)             Assessments & Plan (with Medical Decision Making)   36 yo F presenting with status migrainosus. Also complaining of associated vomiting without some discomfort with swallowing.   Vitals stable.  Likely just having complex migraine, but ddx also included intracerebral hemorrhage, boerhaave's syndrome, elevated IOP and glaucoma.   IV access obtained and lab testing done.   CBC and CMP unremarkable. Lipase normal.   HCG negative.  CT head and CAP performed.   Treated with 1L 0.9NS, 10mg IV compazine, 12.5mg IV benadryl and 10mg IV decadron. Received IM toradol prior to arrival to the ED.     4:38 PM  Pt reassessed. HA improved.  Eye exam without abnormalities.   IOP right 14, IOP left 14  CT head without intracerebral hemorrhage.   CT CAP without abnormalities other than renal cysts.   She was given a GI cocktail.    Toleration PO intake of liquids and solids.   Discharged, and referred to follow up with neurology regarding migraine prophylaxis.       I have reviewed the nursing notes.    I have reviewed the findings, diagnosis, plan and need for follow up with the patient.    Discharge Medication List as of 6/15/2021  4:47 PM          Final diagnoses:   Persistent migraine aura without cerebral infarction and with status migrainosus, not intractable       6/15/2021   [facility] EMERGENCY DEPARTMENT    MD KATINA Styles Katrina Anne, MD  06/15/21 3122     No

## 2021-08-31 NOTE — ED ADULT NURSE NOTE - NS ED NOTE ABUSE RESPONSE YN
[facility]  [address]  Phone [phone] Fax [phone]     Sleep Study Technician Review    Patient Name:  Josh Rueda  :   1949  Referring Provider: Zayra Orozco *    Brief History:  Josh Rueda is a 80 y.o. male with a history of Hypertension, Anxiety, Afib, Obesity, CAD and history of Bronchitis who has been referred for a sleep study. He admits to very loud snoring. He is not sure whether he quits breathing during sleep. He wakes up refreshed but he takes at least one nap during the daytime. Height:  5'11\"  Weight: 291 lbs  BMI: 40.59  Neck Circ:  20\"  MALLAMPATI: Type 4  ESS:      Type of Study: PSG  Time Stage Position Snore Hypopnea Obs Apnea Ronda Apnea PAP O2   2200 Awake Supine No No No No  RA   2300 2 Supine Yes Yes Yes No  RA   2400 2 Supine Yes Yes Yes No  RA   0100 2 Supine Yes Yes Yes No  RA   0200 2 Supine Yes Yes Yes No  2 lpm   0300 2 Supine Yes Yes Yes No  2 lpm   0400 2 Supine Yes Yes Yes No  2 lpm   0430 2 Supine Yes Yes Yes No  2 lpm                Summary: Pt stated that he sleeps well at night and is not sure why he is here. Pt stated that he is on 2 lpm oxygen at night. Pt stated that he does snore. Pts sleep study was started on room air. Oxygen was added at 0100 due to multiple events with oxygen desaturation in the 70's. The study was reviewed briefly with Josh Rueda. He will be notified of the formal results and recommendations after the study is scored and interpreted. The report will be sent to his referring provider.     Technician: YUMIKO oMy Yes

## 2022-12-18 ENCOUNTER — INPATIENT (INPATIENT)
Facility: HOSPITAL | Age: 77
LOS: 8 days | Discharge: NURSING, REHAB OR LONG-TERM CARE | DRG: 956 | End: 2022-12-27
Attending: INTERNAL MEDICINE | Admitting: FAMILY MEDICINE
Payer: MEDICARE

## 2022-12-18 VITALS
RESPIRATION RATE: 16 BRPM | TEMPERATURE: 98 F | DIASTOLIC BLOOD PRESSURE: 118 MMHG | SYSTOLIC BLOOD PRESSURE: 138 MMHG | HEIGHT: 65 IN | HEART RATE: 68 BPM | OXYGEN SATURATION: 95 % | WEIGHT: 126.99 LBS

## 2022-12-18 DIAGNOSIS — S72.002A FRACTURE OF UNSPECIFIED PART OF NECK OF LEFT FEMUR, INITIAL ENCOUNTER FOR CLOSED FRACTURE: ICD-10-CM

## 2022-12-18 DIAGNOSIS — Z98.890 OTHER SPECIFIED POSTPROCEDURAL STATES: Chronic | ICD-10-CM

## 2022-12-18 LAB
ALBUMIN SERPL ELPH-MCNC: 3.4 G/DL — SIGNIFICANT CHANGE UP (ref 3.3–5)
ALP SERPL-CCNC: 62 U/L — SIGNIFICANT CHANGE UP (ref 40–120)
ALT FLD-CCNC: 26 U/L — SIGNIFICANT CHANGE UP (ref 12–78)
ANION GAP SERPL CALC-SCNC: 7 MMOL/L — SIGNIFICANT CHANGE UP (ref 5–17)
APPEARANCE UR: ABNORMAL
APTT BLD: 36.5 SEC — HIGH (ref 27.5–35.5)
AST SERPL-CCNC: 17 U/L — SIGNIFICANT CHANGE UP (ref 15–37)
BASOPHILS # BLD AUTO: 0.03 K/UL — SIGNIFICANT CHANGE UP (ref 0–0.2)
BASOPHILS NFR BLD AUTO: 0.2 % — SIGNIFICANT CHANGE UP (ref 0–2)
BILIRUB SERPL-MCNC: 0.6 MG/DL — SIGNIFICANT CHANGE UP (ref 0.2–1.2)
BILIRUB UR-MCNC: NEGATIVE — SIGNIFICANT CHANGE UP
BUN SERPL-MCNC: 18 MG/DL — SIGNIFICANT CHANGE UP (ref 7–23)
CALCIUM SERPL-MCNC: 8.7 MG/DL — SIGNIFICANT CHANGE UP (ref 8.5–10.1)
CHLORIDE SERPL-SCNC: 109 MMOL/L — HIGH (ref 96–108)
CO2 SERPL-SCNC: 26 MMOL/L — SIGNIFICANT CHANGE UP (ref 22–31)
COLOR SPEC: YELLOW — SIGNIFICANT CHANGE UP
CREAT SERPL-MCNC: 0.78 MG/DL — SIGNIFICANT CHANGE UP (ref 0.5–1.3)
DIFF PNL FLD: ABNORMAL
EGFR: 79 ML/MIN/1.73M2 — SIGNIFICANT CHANGE UP
EOSINOPHIL # BLD AUTO: 0.01 K/UL — SIGNIFICANT CHANGE UP (ref 0–0.5)
EOSINOPHIL NFR BLD AUTO: 0.1 % — SIGNIFICANT CHANGE UP (ref 0–6)
FLUAV AG NPH QL: SIGNIFICANT CHANGE UP
FLUBV AG NPH QL: SIGNIFICANT CHANGE UP
GLUCOSE SERPL-MCNC: 136 MG/DL — HIGH (ref 70–99)
GLUCOSE UR QL: NEGATIVE — SIGNIFICANT CHANGE UP
HCT VFR BLD CALC: 36.5 % — SIGNIFICANT CHANGE UP (ref 34.5–45)
HGB BLD-MCNC: 11.9 G/DL — SIGNIFICANT CHANGE UP (ref 11.5–15.5)
IMM GRANULOCYTES NFR BLD AUTO: 0.4 % — SIGNIFICANT CHANGE UP (ref 0–0.9)
INR BLD: 2.71 RATIO — HIGH (ref 0.88–1.16)
KETONES UR-MCNC: ABNORMAL
LEUKOCYTE ESTERASE UR-ACNC: ABNORMAL
LYMPHOCYTES # BLD AUTO: 0.91 K/UL — LOW (ref 1–3.3)
LYMPHOCYTES # BLD AUTO: 7.4 % — LOW (ref 13–44)
MAGNESIUM SERPL-MCNC: 2 MG/DL — SIGNIFICANT CHANGE UP (ref 1.6–2.6)
MCHC RBC-ENTMCNC: 30.7 PG — SIGNIFICANT CHANGE UP (ref 27–34)
MCHC RBC-ENTMCNC: 32.6 GM/DL — SIGNIFICANT CHANGE UP (ref 32–36)
MCV RBC AUTO: 94.3 FL — SIGNIFICANT CHANGE UP (ref 80–100)
MONOCYTES # BLD AUTO: 0.74 K/UL — SIGNIFICANT CHANGE UP (ref 0–0.9)
MONOCYTES NFR BLD AUTO: 6 % — SIGNIFICANT CHANGE UP (ref 2–14)
NEUTROPHILS # BLD AUTO: 10.51 K/UL — HIGH (ref 1.8–7.4)
NEUTROPHILS NFR BLD AUTO: 85.9 % — HIGH (ref 43–77)
NITRITE UR-MCNC: NEGATIVE — SIGNIFICANT CHANGE UP
PH UR: 6.5 — SIGNIFICANT CHANGE UP (ref 5–8)
PLATELET # BLD AUTO: 202 K/UL — SIGNIFICANT CHANGE UP (ref 150–400)
POTASSIUM SERPL-MCNC: 3.8 MMOL/L — SIGNIFICANT CHANGE UP (ref 3.5–5.3)
POTASSIUM SERPL-SCNC: 3.8 MMOL/L — SIGNIFICANT CHANGE UP (ref 3.5–5.3)
PROT SERPL-MCNC: 6.8 GM/DL — SIGNIFICANT CHANGE UP (ref 6–8.3)
PROT UR-MCNC: SIGNIFICANT CHANGE UP MG/DL
PROTHROM AB SERPL-ACNC: 31.8 SEC — HIGH (ref 10.5–13.4)
RBC # BLD: 3.87 M/UL — SIGNIFICANT CHANGE UP (ref 3.8–5.2)
RBC # FLD: 13.1 % — SIGNIFICANT CHANGE UP (ref 10.3–14.5)
RSV RNA NPH QL NAA+NON-PROBE: SIGNIFICANT CHANGE UP
SARS-COV-2 RNA SPEC QL NAA+PROBE: SIGNIFICANT CHANGE UP
SODIUM SERPL-SCNC: 142 MMOL/L — SIGNIFICANT CHANGE UP (ref 135–145)
SP GR SPEC: 1 — LOW (ref 1.01–1.02)
TROPONIN I, HIGH SENSITIVITY RESULT: 4.64 NG/L — SIGNIFICANT CHANGE UP
UROBILINOGEN FLD QL: NEGATIVE — SIGNIFICANT CHANGE UP
WBC # BLD: 12.25 K/UL — HIGH (ref 3.8–10.5)
WBC # FLD AUTO: 12.25 K/UL — HIGH (ref 3.8–10.5)

## 2022-12-18 PROCEDURE — 71045 X-RAY EXAM CHEST 1 VIEW: CPT | Mod: 26

## 2022-12-18 PROCEDURE — 86901 BLOOD TYPING SEROLOGIC RH(D): CPT

## 2022-12-18 PROCEDURE — 74177 CT ABD & PELVIS W/CONTRAST: CPT | Mod: 26,MA

## 2022-12-18 PROCEDURE — 82306 VITAMIN D 25 HYDROXY: CPT

## 2022-12-18 PROCEDURE — 93970 EXTREMITY STUDY: CPT

## 2022-12-18 PROCEDURE — U0005: CPT

## 2022-12-18 PROCEDURE — P9016: CPT

## 2022-12-18 PROCEDURE — 93970 EXTREMITY STUDY: CPT | Mod: 26

## 2022-12-18 PROCEDURE — 83735 ASSAY OF MAGNESIUM: CPT

## 2022-12-18 PROCEDURE — 86850 RBC ANTIBODY SCREEN: CPT

## 2022-12-18 PROCEDURE — 84100 ASSAY OF PHOSPHORUS: CPT

## 2022-12-18 PROCEDURE — 80048 BASIC METABOLIC PNL TOTAL CA: CPT

## 2022-12-18 PROCEDURE — 76000 FLUOROSCOPY <1 HR PHYS/QHP: CPT

## 2022-12-18 PROCEDURE — 85025 COMPLETE CBC W/AUTO DIFF WBC: CPT

## 2022-12-18 PROCEDURE — 85027 COMPLETE CBC AUTOMATED: CPT

## 2022-12-18 PROCEDURE — 99222 1ST HOSP IP/OBS MODERATE 55: CPT

## 2022-12-18 PROCEDURE — 85730 THROMBOPLASTIN TIME PARTIAL: CPT

## 2022-12-18 PROCEDURE — 86923 COMPATIBILITY TEST ELECTRIC: CPT

## 2022-12-18 PROCEDURE — 72125 CT NECK SPINE W/O DYE: CPT | Mod: 26,MA

## 2022-12-18 PROCEDURE — 97167 OT EVAL HIGH COMPLEX 60 MIN: CPT | Mod: GO

## 2022-12-18 PROCEDURE — 97535 SELF CARE MNGMENT TRAINING: CPT | Mod: GO

## 2022-12-18 PROCEDURE — 97530 THERAPEUTIC ACTIVITIES: CPT | Mod: GP

## 2022-12-18 PROCEDURE — 99223 1ST HOSP IP/OBS HIGH 75: CPT

## 2022-12-18 PROCEDURE — 99285 EMERGENCY DEPT VISIT HI MDM: CPT

## 2022-12-18 PROCEDURE — 36430 TRANSFUSION BLD/BLD COMPNT: CPT

## 2022-12-18 PROCEDURE — 73552 X-RAY EXAM OF FEMUR 2/>: CPT | Mod: 26,LT

## 2022-12-18 PROCEDURE — 36415 COLL VENOUS BLD VENIPUNCTURE: CPT

## 2022-12-18 PROCEDURE — 87635 SARS-COV-2 COVID-19 AMP PRB: CPT

## 2022-12-18 PROCEDURE — C1889: CPT

## 2022-12-18 PROCEDURE — U0003: CPT

## 2022-12-18 PROCEDURE — 73501 X-RAY EXAM HIP UNI 1 VIEW: CPT | Mod: 26,59,LT

## 2022-12-18 PROCEDURE — 73503 X-RAY EXAM HIP UNI 4/> VIEWS: CPT | Mod: 26,LT

## 2022-12-18 PROCEDURE — 86900 BLOOD TYPING SEROLOGIC ABO: CPT

## 2022-12-18 PROCEDURE — 70450 CT HEAD/BRAIN W/O DYE: CPT | Mod: 26,MA

## 2022-12-18 PROCEDURE — C1713: CPT

## 2022-12-18 PROCEDURE — 97162 PT EVAL MOD COMPLEX 30 MIN: CPT | Mod: GP

## 2022-12-18 PROCEDURE — 71260 CT THORAX DX C+: CPT | Mod: 26,MA

## 2022-12-18 PROCEDURE — 85610 PROTHROMBIN TIME: CPT

## 2022-12-18 PROCEDURE — 82040 ASSAY OF SERUM ALBUMIN: CPT

## 2022-12-18 PROCEDURE — 97116 GAIT TRAINING THERAPY: CPT | Mod: GP

## 2022-12-18 RX ORDER — CLONAZEPAM 1 MG
0.25 TABLET ORAL
Refills: 0 | Status: DISCONTINUED | OUTPATIENT
Start: 2022-12-18 | End: 2022-12-25

## 2022-12-18 RX ORDER — ACETAMINOPHEN 500 MG
650 TABLET ORAL EVERY 6 HOURS
Refills: 0 | Status: DISCONTINUED | OUTPATIENT
Start: 2022-12-18 | End: 2022-12-27

## 2022-12-18 RX ORDER — PANTOPRAZOLE SODIUM 20 MG/1
40 TABLET, DELAYED RELEASE ORAL
Refills: 0 | Status: DISCONTINUED | OUTPATIENT
Start: 2022-12-18 | End: 2022-12-27

## 2022-12-18 RX ORDER — ESCITALOPRAM OXALATE 10 MG/1
10 TABLET, FILM COATED ORAL DAILY
Refills: 0 | Status: DISCONTINUED | OUTPATIENT
Start: 2022-12-18 | End: 2022-12-27

## 2022-12-18 RX ORDER — LIDOCAINE 4 G/100G
1 CREAM TOPICAL DAILY
Refills: 0 | Status: DISCONTINUED | OUTPATIENT
Start: 2022-12-18 | End: 2022-12-27

## 2022-12-18 RX ORDER — MORPHINE SULFATE 50 MG/1
4 CAPSULE, EXTENDED RELEASE ORAL ONCE
Refills: 0 | Status: DISCONTINUED | OUTPATIENT
Start: 2022-12-18 | End: 2022-12-18

## 2022-12-18 RX ORDER — HEPARIN SODIUM 5000 [USP'U]/ML
5000 INJECTION INTRAVENOUS; SUBCUTANEOUS EVERY 8 HOURS
Refills: 0 | Status: DISCONTINUED | OUTPATIENT
Start: 2022-12-18 | End: 2022-12-19

## 2022-12-18 RX ORDER — ACETAMINOPHEN 500 MG
1000 TABLET ORAL ONCE
Refills: 0 | Status: COMPLETED | OUTPATIENT
Start: 2022-12-18 | End: 2022-12-18

## 2022-12-18 RX ORDER — MORPHINE SULFATE 50 MG/1
2 CAPSULE, EXTENDED RELEASE ORAL EVERY 6 HOURS
Refills: 0 | Status: DISCONTINUED | OUTPATIENT
Start: 2022-12-18 | End: 2022-12-20

## 2022-12-18 RX ORDER — GABAPENTIN 400 MG/1
1 CAPSULE ORAL
Qty: 0 | Refills: 0 | DISCHARGE

## 2022-12-18 RX ORDER — OXYBUTYNIN CHLORIDE 5 MG
10 TABLET ORAL DAILY
Refills: 0 | Status: DISCONTINUED | OUTPATIENT
Start: 2022-12-18 | End: 2022-12-20

## 2022-12-18 RX ORDER — SODIUM CHLORIDE 9 MG/ML
1000 INJECTION, SOLUTION INTRAVENOUS
Refills: 0 | Status: DISCONTINUED | OUTPATIENT
Start: 2022-12-19 | End: 2022-12-20

## 2022-12-18 RX ORDER — GABAPENTIN 400 MG/1
300 CAPSULE ORAL AT BEDTIME
Refills: 0 | Status: DISCONTINUED | OUTPATIENT
Start: 2022-12-18 | End: 2022-12-27

## 2022-12-18 RX ORDER — ALPRAZOLAM 0.25 MG
1 TABLET ORAL
Qty: 0 | Refills: 0 | DISCHARGE

## 2022-12-18 RX ADMIN — MORPHINE SULFATE 4 MILLIGRAM(S): 50 CAPSULE, EXTENDED RELEASE ORAL at 20:56

## 2022-12-18 RX ADMIN — HEPARIN SODIUM 5000 UNIT(S): 5000 INJECTION INTRAVENOUS; SUBCUTANEOUS at 20:57

## 2022-12-18 RX ADMIN — Medication 0.25 MILLIGRAM(S): at 20:56

## 2022-12-18 RX ADMIN — GABAPENTIN 300 MILLIGRAM(S): 400 CAPSULE ORAL at 20:56

## 2022-12-18 RX ADMIN — MORPHINE SULFATE 4 MILLIGRAM(S): 50 CAPSULE, EXTENDED RELEASE ORAL at 21:11

## 2022-12-18 NOTE — H&P ADULT - HISTORY OF PRESENT ILLNESS
77-year-old female with h/o  chronic back pain VCFs s/p Kyphoplasty, GERD, anxiety, osteoporosis, and hepatic cysts presents the ED from [facility] after unwitnessed fall.  As per triage note patient was reaching for heater while sitting on chair fell over onto left side.  Here patient states that she did not syncopized she is unsure why she fell but states she was not able to get up off the floor.  States she has pain in her left hip left side of back.  Denies chest pain shortness of breath nausea vomiting headache neck pain.  Trauma alert was called upon arrival.  does endorses multiple falls in the past year.      In ED, noted to have left intertrochanteric hip fracture.  ecg-nsr

## 2022-12-18 NOTE — CONSULT NOTE ADULT - SUBJECTIVE AND OBJECTIVE BOX
77-year-old female with h/o  chronic back pain VCFs s/p Kyphoplasty, GERD, anxiety, osteoporosis, and hepatic cysts presents the ED from [facility] after unwitnessed fall.  as per pt, she was reaching for heater while sitting on chair fell over onto left side.  She denies HT or LOC. C/O pain in her left hip and left side of back.  Denies chest pain shortness of breath nausea vomiting headache neck pain.  Pt endorses multiple falls in the last multiple months, the last one a few weeks ago.     ROS: as above otherwise negative    PAST MEDICAL HISTORY:  Anxiety     Chronic GERD     Hepatic cyst     Osteoporosis.     PAST SURGICAL HISTORY:  Status post lumbar spine operation.   Allergies:Allergies    No Known Drug Allergies  shellfish (Unknown)    Intolerances    SH: no reported etoh/tobacco/illicit drug use    FAMILY HISTORY:  FH: diabetes mellitus  FHx: leukemia, mother, age 50s.    Vital Signs Last 24 Hrs  T(C): 36.5 (18 Dec 2022 14:14), Max: 36.9 (18 Dec 2022 10:00)  T(F): 97.7 (18 Dec 2022 14:14), Max: 98.4 (18 Dec 2022 10:00)  HR: 66 (18 Dec 2022 14:14) (66 - 72)  BP: 129/52 (18 Dec 2022 14:14) (129/52 - 138/118)  BP(mean): 70 (18 Dec 2022 14:14) (70 - 70)  RR: 18 (18 Dec 2022 14:14) (16 - 18)  SpO2: 100% (18 Dec 2022 14:14) (95% - 100%)    Parameters below as of 18 Dec 2022 14:14  Patient On (Oxygen Delivery Method): room air      Physical Exam:  Psych: normal affect    General: NAD, Alert, Awake and oriented  Neurologic: No gross deficits, moving all 4s.  Head: NCAT without abrasions, lacerations, or ecchymosis to head, face, or scalp  Eyes: PERRL  C-Spine: FAROM. No bony TTP.  Neck: no JVD, no tracheal deviation  Resp: CTAB  CVS: s1s2+  T/L Spine: No new bony TTP, no palpable step-off.  Abdomen: soft, NT, ND  Extremities: FROM on RLE, LLE limited ROM due to pain in the L hip, B/L pedal pulses present, warm and well perfused extremities, tender at the L hip, nontender R hip  Skin: no adverse lesions appreciated                            11.9   12.25 )-----------( 202      ( 18 Dec 2022 07:39 )             36.5   12-18    142  |  109<H>  |  18  ----------------------------<  136<H>  3.8   |  26  |  0.78    Ca    8.7      18 Dec 2022 07:39  Mg     2.0     12-18    TPro  6.8  /  Alb  3.4  /  TBili  0.6  /  DBili  x   /  AST  17  /  ALT  26  /  AlkPhos  62  12-18    < from: US Duplex Venous Lower Ext Complete, Bilateral (12.18.22 @ 13:10) >  IMPRESSION:  Persistent DEEP venous thrombosis involving the LEFT femoral, popliteal,   and gastrocnemius veins. This was identified previously on January 29, 2020.    < end of copied text >  < from: CT Abdomen and Pelvis w/ IV Cont (12.18.22 @ 08:43) >  IMPRESSION:  Comminuted left intertrochanteric hip fracture.  Compression deformity T12 of indeterminate age.  Displaced proximal sacral fracture of indeterminate age    < end of copied text >

## 2022-12-18 NOTE — H&P ADULT - NSHPPHYSICALEXAM_GEN_ALL_CORE
PHYSICAL EXAM:    General: NAD, Alert & Oriented X3.  Neck: Soft and supple. No JVD  Respiratory: Clear breath sounds bilaterally.  No wheezing, rales or rhonchi  Cardiovascular: S1 and S2, regular rate and rhythm.  No murmurs, gallops or rubs  Gastrointestinal: Soft, non-tender, non-distended. No guarding, rebound tenderness, +BS  Extremities: +2 peripheral pulses bilaterally.  No clubbing, cyanosis, or edema.    Neurological:  No focal deficits  Musculoskeletal: left leg- deviated outward.  TTP  Skin: No rashes

## 2022-12-18 NOTE — ED PROVIDER NOTE - PROGRESS NOTE DETAILS
X-ray reviewed by myself.  Patient with left hip fracture.  Ortho paged will see patient. Jna Dawson M.D., Attending Physician ct appreciated trauma consulted will see patient shortly. Jan Dawson M.D., Attending Physician The patient was seen by surgery.  They state that the age-indeterminate fractures are all old.  After examining the patient and speaking with the patient and the family they feel like those fractures are from old falls.  They state that there is nothing to do for them as they are old and that the patient can be admitted to medicine for the hip fracture.  Patient was endorsed to Dr. Ciaran ascencio. Jan Dawson M.D., Attending Physician

## 2022-12-18 NOTE — CONSULT NOTE ADULT - ATTENDING COMMENTS
A/P:  H/O multiple falls on coumadin  Left hip fracture  Subacute sacral and T12 compression fractures  Medical mgmt per medical specialists  Ortho for hip fracture mgmt  No acute trauma surgical intervention for pt  REconsult prn surgical needs

## 2022-12-18 NOTE — PATIENT PROFILE ADULT - NS PRO AD PATIENT TYPE
Non-Hospital DNR from [facility]/Health Care Proxy (HCP) Non-Hospital DNR from [facility]/Health Care Proxy (HCP)/Do Not Resuscitate (DNR)

## 2022-12-18 NOTE — CONSULT NOTE ADULT - SUBJECTIVE AND OBJECTIVE BOX
77y Female community ambulatory presents c/o L hip pain and inability to ambulate sp unwitnessed fall at [facility] assisted living. Found on the ground this AM. Denies HS/LOC. Denies numbness/tingling. Denies fever/chills. Denies pain/injury elsewhere. Patient reports hx of chronic back pain VCFs s/p Kyphoplasty T10, T12, L2 ('20, [facility] for which she uses a lidocaine patch with good relief. Patient Reports no new back pain since the fall or worsening of pain. On coumadin for LLE DVT persistent since 2020. Uses cane / walker at baseline. Last dose coumadin last night 12/17 PM.    D/w Cousin Thelma Shepard at bedside (Cousin / .[phone]). Per family has mild waxing/waning cognitive status, however patient able to make medical decisions.       MEDICATIONS  (STANDING):    Allergies    No Known Drug Allergies  shellfish (Unknown)    Intolerances                            11.9   12.25 )-----------( 202      ( 18 Dec 2022 07:39 )             36.5     18 Dec 2022 07:39    142    |  109    |  18     ----------------------------<  136    3.8     |  26     |  0.78     Ca    8.7        18 Dec 2022 07:39  Mg     2.0       18 Dec 2022 07:39    TPro  6.8    /  Alb  3.4    /  TBili  0.6    /  DBili  x      /  AST  17     /  ALT  26     /  AlkPhos  62     18 Dec 2022 07:39    PT/INR - ( 18 Dec 2022 07:39 )   PT: 31.8 sec;   INR: 2.71 ratio         PTT - ( 18 Dec 2022 07:39 )  PTT:36.5 sec  Vital Signs Last 24 Hrs  T(C): 36.4 (12-18-22 @ 07:07), Max: 36.4 (12-18-22 @ 07:07)  T(F): 97.6 (12-18-22 @ 07:07), Max: 97.6 (12-18-22 @ 07:07)  HR: 68 (12-18-22 @ 07:07) (68 - 68)  BP: 138/118 (12-18-22 @ 07:07) (138/118 - 138/118)  BP(mean): --  RR: 16 (12-18-22 @ 07:07) (16 - 16)  SpO2: 95% (12-18-22 @ 07:07) (95% - 95%)      Imaging: XR demonstrates L hip fracture    Physical Exam  General: NAD, Alert, Awake and oriented  Neurologic: No gross deficits, moving all 4s.  Head: NCAT without abrasions, lacerations, or ecchymosis to head, face, or scalp  Eyes: PERRL  Neck/C-Spine: FAROM. No bony TTP.  T/L Spine: No new bony TTP, no palpable step-off.    HIPS and PELVIS: Unable to SLR L Hip; Able to SLR R hip well      LLE:   Skin intact, shortened/Externally rotated   NTTP over the bony prominences of the knee/ankle/foot  TTP over hip   Unable to ranger hip 2/2 fx, unable to range knee 2/2 known hip fx   painless passive/active ROM of the ankle/foot  L2-S1 SILT  motor grossly intact throughout hip flexors/quads/hams/TA/EHL/FHL/GSC  + DP, weak PT pulses  compartments soft and compressible  calves nontender        SECONDARY EXAM: Benign, Skin intact, SILT throughout, motor grossly intact throughout, no other orthopedic injuries at this time, compartments soft and compressible. Mild tenderness over lumbar spine, patient reports c/w baseline. No tenderness over sacrum /coccyx    B/l UE:  No gross deformity  NTTP over the bony prominences of the shoulder/elbow/wrist/hand  painless passive/active ROM of the shoulder/elbow/wrist/hand, C5-T1 SILT  motor grossly intact throughout axillary/musculocutaenous/radial/median/ulnar nerves  + radial pulse    RLE:     RLE:   No gross deformity  NTTP over the bony prominences of the hip/knee/ankle/foot  painless passive/active ROM of the hip/knee/ankle/foot  L2-S1 SILT  motor grossly intact throughout hip flexors/quads/hams/TA/EHL/FHL/GSC  + DP/PT pulses  no pain with log roll, no pain on axial loading  compartments soft and compressible  calves nontender        A/P: 77y Female with L basicervical femoral neck hip fracture    Plan for IMN of the Left IT Fx tomorrow 12/19 with Dr. Ponce  Will keep NPO for now until confirming w/ Dr. Ponce  IVF while NPO  Please document risk stratification for OR / medical optimization  Hold chemical VTE ppx for OR 12/19  SCDs OK  Consider Vitamin K, 3mg IV and rechecking INR 6-8 hrs after  FU BLLE US Dopp for Hx of LLE DVT  CXR/EKG obtained  Preop labs ordered  Imaging reviewed  Hx of numerous vcfs, chronic  Pain control  NWB LLE, bedrest  FU labs/imaging  Admit to medical team  Will discuss with attending Dr. Ponce  Will advise if plan changes    77y Female community ambulatory presents c/o L hip pain and inability to ambulate sp unwitnessed fall at [facility] assisted living. Found on the ground this AM. Denies HS/LOC. Denies numbness/tingling. Denies fever/chills. Denies pain/injury elsewhere. Patient reports hx of chronic back pain VCFs s/p Kyphoplasty T10, T12, L2 ('20, [facility] for which she uses a lidocaine patch with good relief. Patient Reports no new back pain since the fall or worsening of pain. On coumadin for LLE DVT persistent since 2020. Uses cane / walker at baseline. Last dose coumadin last night 12/17 PM.    D/w Cousin Thelma Shepard at bedside (Cousin / .[phone]). Per family has mild waxing/waning cognitive status, however patient able to make medical decisions.      Hx constipation on laxatives at [facility].     MEDICATIONS  (STANDING):    Allergies    No Known Drug Allergies  shellfish (Unknown)    Intolerances                            11.9   12.25 )-----------( 202      ( 18 Dec 2022 07:39 )             36.5     18 Dec 2022 07:39    142    |  109    |  18     ----------------------------<  136    3.8     |  26     |  0.78     Ca    8.7        18 Dec 2022 07:39  Mg     2.0       18 Dec 2022 07:39    TPro  6.8    /  Alb  3.4    /  TBili  0.6    /  DBili  x      /  AST  17     /  ALT  26     /  AlkPhos  62     18 Dec 2022 07:39    PT/INR - ( 18 Dec 2022 07:39 )   PT: 31.8 sec;   INR: 2.71 ratio         PTT - ( 18 Dec 2022 07:39 )  PTT:36.5 sec  Vital Signs Last 24 Hrs  T(C): 36.4 (12-18-22 @ 07:07), Max: 36.4 (12-18-22 @ 07:07)  T(F): 97.6 (12-18-22 @ 07:07), Max: 97.6 (12-18-22 @ 07:07)  HR: 68 (12-18-22 @ 07:07) (68 - 68)  BP: 138/118 (12-18-22 @ 07:07) (138/118 - 138/118)  BP(mean): --  RR: 16 (12-18-22 @ 07:07) (16 - 16)  SpO2: 95% (12-18-22 @ 07:07) (95% - 95%)      Imaging: XR demonstrates L hip fracture    Physical Exam  General: NAD, Alert, Awake and oriented  Neurologic: No gross deficits, moving all 4s.  Head: NCAT without abrasions, lacerations, or ecchymosis to head, face, or scalp  Eyes: PERRL  Neck/C-Spine: FAROM. No bony TTP.  T/L Spine: No new bony TTP, no palpable step-off.    HIPS and PELVIS: Unable to SLR L Hip; Able to SLR R hip well      LLE:   Skin intact, shortened/Externally rotated   NTTP over the bony prominences of the knee/ankle/foot  TTP over hip   Unable to ranger hip 2/2 fx, unable to range knee 2/2 known hip fx   painless passive/active ROM of the ankle/foot  L2-S1 SILT  motor grossly intact throughout hip flexors/quads/hams/TA/EHL/FHL/GSC  + DP, weak PT pulses  compartments soft and compressible  calves nontender        SECONDARY EXAM: Benign, Skin intact, SILT throughout, motor grossly intact throughout, no other orthopedic injuries at this time, compartments soft and compressible. Mild tenderness over lumbar spine, patient reports c/w baseline. No tenderness over sacrum /coccyx    B/l UE:  No gross deformity  NTTP over the bony prominences of the shoulder/elbow/wrist/hand  painless passive/active ROM of the shoulder/elbow/wrist/hand, C5-T1 SILT  motor grossly intact throughout axillary/musculocutaenous/radial/median/ulnar nerves  + radial pulse    RLE:     RLE:   No gross deformity  NTTP over the bony prominences of the hip/knee/ankle/foot  painless passive/active ROM of the hip/knee/ankle/foot  L2-S1 SILT  motor grossly intact throughout hip flexors/quads/hams/TA/EHL/FHL/GSC  + DP/PT pulses  no pain with log roll, no pain on axial loading  compartments soft and compressible  calves nontender        A/P: 77y Female with L basicervical femoral neck hip fracture    Plan for IMN of the Left IT Fx tomorrow 12/19 with Dr. Ponce  Will keep NPO for now until confirming w/ Dr. Ponce  IVF while NPO  Please document risk stratification for OR / medical optimization  Hold chemical VTE ppx for OR 12/19  SCDs OK  Consider Vitamin K, 3mg IV and rechecking INR 6-8 hrs after  FU BLLE US Dopp for Hx of LLE DVT  CXR/EKG obtained  Preop labs ordered  Imaging reviewed  Hx of numerous vcfs, chronic  Pain control  NWB LLE, bedrest  FU labs/imaging  Admit to medical team  Will discuss with attending Dr. Ponce  Will advise if plan changes    77y Female community ambulatory presents c/o L hip pain and inability to ambulate sp unwitnessed fall at [facility] assisted living. Found on the ground this AM. Denies HS/LOC. Denies numbness/tingling. Denies fever/chills. Denies pain/injury elsewhere. Patient reports hx of chronic back pain VCFs s/p Kyphoplasty T10, T12, L2 ('20, [facility] for which she uses a lidocaine patch with good relief. Patient Reports no new back pain since the fall or worsening of pain. On coumadin for LLE DVT persistent since 2020. Uses cane / walker at baseline. Last dose coumadin last night 12/17 PM.    D/w Cousin Thelma Shepard at bedside (Cousin / .[phone]). Per family has mild waxing/waning cognitive status, however patient able to make medical decisions.      Hx constipation on laxatives at [facility].     MEDICATIONS  (STANDING):    Allergies    No Known Drug Allergies  shellfish (Unknown)    Intolerances                            11.9   12.25 )-----------( 202      ( 18 Dec 2022 07:39 )             36.5     18 Dec 2022 07:39    142    |  109    |  18     ----------------------------<  136    3.8     |  26     |  0.78     Ca    8.7        18 Dec 2022 07:39  Mg     2.0       18 Dec 2022 07:39    TPro  6.8    /  Alb  3.4    /  TBili  0.6    /  DBili  x      /  AST  17     /  ALT  26     /  AlkPhos  62     18 Dec 2022 07:39    PT/INR - ( 18 Dec 2022 07:39 )   PT: 31.8 sec;   INR: 2.71 ratio         PTT - ( 18 Dec 2022 07:39 )  PTT:36.5 sec  Vital Signs Last 24 Hrs  T(C): 36.4 (12-18-22 @ 07:07), Max: 36.4 (12-18-22 @ 07:07)  T(F): 97.6 (12-18-22 @ 07:07), Max: 97.6 (12-18-22 @ 07:07)  HR: 68 (12-18-22 @ 07:07) (68 - 68)  BP: 138/118 (12-18-22 @ 07:07) (138/118 - 138/118)  BP(mean): --  RR: 16 (12-18-22 @ 07:07) (16 - 16)  SpO2: 95% (12-18-22 @ 07:07) (95% - 95%)      Imaging: XR demonstrates L hip fracture    Physical Exam  General: NAD, Alert, Awake and oriented  Neurologic: No gross deficits, moving all 4s.  Head: NCAT without abrasions, lacerations, or ecchymosis to head, face, or scalp  Eyes: PERRL  Neck/C-Spine: FAROM. No bony TTP.  T/L Spine: No new bony TTP, no palpable step-off.    HIPS and PELVIS: Unable to SLR L Hip; Able to SLR R hip well      LLE:   Skin intact, shortened/Externally rotated   NTTP over the bony prominences of the knee/ankle/foot  TTP over hip   Unable to ranger hip 2/2 fx, unable to range knee 2/2 known hip fx   painless passive/active ROM of the ankle/foot  L2-S1 SILT  motor grossly intact throughout hip flexors/quads/hams/TA/EHL/FHL/GSC  + DP, weak PT pulses  compartments soft and compressible  calves nontender        SECONDARY EXAM: Benign, Skin intact, SILT throughout, motor grossly intact throughout, no other orthopedic injuries at this time, compartments soft and compressible. Mild tenderness over lumbar spine, patient reports c/w baseline. No tenderness over sacrum /coccyx    B/l UE:  No gross deformity  NTTP over the bony prominences of the shoulder/elbow/wrist/hand  painless passive/active ROM of the shoulder/elbow/wrist/hand, C5-T1 SILT  motor grossly intact throughout axillary/musculocutaenous/radial/median/ulnar nerves  + radial pulse    RLE:     RLE:   No gross deformity  NTTP over the bony prominences of the hip/knee/ankle/foot  painless passive/active ROM of the hip/knee/ankle/foot  L2-S1 SILT  motor grossly intact throughout hip flexors/quads/hams/TA/EHL/FHL/GSC  + DP/PT pulses  no pain with log roll, no pain on axial loading  compartments soft and compressible  calves nontender        A/P: 77y Female with L basicervical femoral neck hip fracture    Plan for IMN of the Left IT Fx Tuesday 12/20 with Dr. Ponce  Will need to be made NPO after midnight 12/19  IVF while NPO  Please document risk stratification for OR / medical optimization  Hold chemical VTE ppx for OR 12/20  SCDs OK  Consider Vitamin K, 3mg IV and rechecking INR 6-8 hrs after tomorrow, will montior INR  FU BLLE US Dopp for Hx of LLE DVT  CXR/EKG obtained  Preop labs ordered  Imaging reviewed  Hx of numerous vcfs, chronic  Pain control  NWB LLE, bedrest  FU labs/imaging  Admit to medical team  Will discuss with attending Dr. Ponce  Will advise if plan changes

## 2022-12-18 NOTE — H&P ADULT - ASSESSMENT
left intertrochanteric hip fracture, s/p unwitnessed fall   -admit to med/surg   -ortho consult   -pain meds as needed  -f/u am labs     Compression deformity T12, seen by ortho, deemed   -cont home gabapentin,   -pain meds as needed     Displaced proximal sacral fracture of indeterminate age  -pain meds as needed     anxiety   -cont home clonazepam    Afib on vka   -cont vka    dvt prophylaxis   -ipc bilaterally          left intertrochanteric hip fracture, s/p unwitnessed fall   -admit to med/surg   -ortho consult   -pain meds as needed  -f/u am labs     Compression deformity T12, seen by ortho, deemed   -cont home gabapentin,   -pain meds as needed     Displaced proximal sacral fracture of indeterminate age  -pain meds as needed     leukocytosis with abnormal u/a.  afebrile and no urinary symptoms   -hold abx for now   -f/u am level   -check ucx     anxiety   -cont home clonazepam    chronic LLL DVT  -hold vka for procedure     dvt prophylaxis   -ipc bilaterally          left intertrochanteric hip fracture, s/p unwitnessed fall   -admit to med/surg   -ortho consult, plan for OR on 12/20  -pain meds as needed  -f/u am labs     Compression deformity T12, seen by ortho, deemed   -cont home gabapentin,   -pain meds as needed     Displaced proximal sacral fracture of indeterminate age  -pain meds as needed     leukocytosis with abnormal u/a.  afebrile and no urinary symptoms   -hold abx for now   -f/u am level   -check ucx     anxiety   -cont home clonazepam    chronic LLL DVT  -hold vka for procedure     dvt prophylaxis   -ipc bilaterally

## 2022-12-18 NOTE — ED ADULT NURSE NOTE - NSIMPLEMENTINTERV_GEN_ALL_ED
Implemented All Fall with Harm Risk Interventions:  [place] to call system. Call bell, personal items and telephone within reach. Instruct patient to call for assistance. Room bathroom lighting operational. Non-slip footwear when patient is off stretcher. Physically safe environment: no spills, clutter or unnecessary equipment. Stretcher in lowest position, wheels locked, appropriate side rails in place. Provide visual cue, wrist band, yellow gown, etc. Monitor gait and stability. Monitor for mental status changes and reorient to person, place, and time. Review medications for side effects contributing to fall risk. Reinforce activity limits and safety measures with patient and family. Provide visual clues: red socks.

## 2022-12-18 NOTE — ED PROVIDER NOTE - CLINICAL SUMMARY MEDICAL DECISION MAKING FREE TEXT BOX
77-year-old female presents the ED from [facility] for unwitnessed fall.  As per triage note patient was reaching for heater while sitting on chair fell over onto left side.  EMS stated patient was on Coumadin however upon review of patient's medication list from [facility] Coumadin is not listed.  Here patient states that she did not syncopized she is unsure why she fell but states she was not able to get up off the floor.  States she has pain in her left hip left side of back.  Denies chest pain shortness of breath nausea vomiting headache neck pain.  Trauma alert was called upon arrival exam with + ttp to left chest wall, pelvis stable,+ ttp left hip +ttp left mid back. Patient with unwitnessed fall.  Will rule out traumatic injury.  Rule out ACS infection.  No signs of CVA at this time.  Will obtain labs EKG urine CT x-ray reassess 77-year-old female presents the ED from [facility] for unwitnessed fall.  As per triage note patient was reaching for heater while sitting on chair fell over onto left side.  EMS stated patient was on Coumadin however upon review of patient's medication list from [facility] Coumadin is not listed.  Here patient states that she did not syncopized she is unsure why she fell but states she was not able to get up off the floor.  States she has pain in her left hip left side of back.  Denies chest pain shortness of breath nausea vomiting headache neck pain.  Trauma alert was called upon arrival exam with + ttp to left chest wall, pelvis stable,+ ttp left hip +ttp left mid back. Patient with unwitnessed fall.  Will rule out traumatic injury.  Rule out ACS infection.  No signs of CVA at this time.  Will obtain labs EKG urine CT x-ray reassess    X-ray reviewed by myself.  Patient with left hip fracture.  Ortho paged will see patient 77-year-old female presents the ED from [facility] for unwitnessed fall.  As per triage note patient was reaching for heater while sitting on chair fell over onto left side.  EMS stated patient was on Coumadin however upon review of patient's medication list from [facility] Coumadin is not listed.  Here patient states that she did not syncopized she is unsure why she fell but states she was not able to get up off the floor.  States she has pain in her left hip left side of back.  Denies chest pain shortness of breath nausea vomiting headache neck pain.  Trauma alert was called upon arrival exam with + ttp to left chest wall, pelvis stable,+ ttp left hip +ttp left mid back. Patient with unwitnessed fall.  Will rule out traumatic injury.  Rule out ACS infection.  No signs of CVA at this time.  Will obtain labs EKG urine CT x-ray reassess    X-ray reviewed by myself.  Patient with left hip fracture.  Ortho paged will see patient    Spoke with HCP and updated on patients condition 77-year-old female presents the ED from [facility] for unwitnessed fall.  As per triage note patient was reaching for heater while sitting on chair fell over onto left side.  EMS stated patient was on Coumadin however upon review of patient's medication list from [facility] Coumadin is not listed.  Here patient states that she did not syncopized she is unsure why she fell but states she was not able to get up off the floor.  States she has pain in her left hip left side of back.  Denies chest pain shortness of breath nausea vomiting headache neck pain.  Trauma alert was called upon arrival exam with + ttp to left chest wall, pelvis stable,+ ttp left hip +ttp left mid back. Patient with unwitnessed fall.  Will rule out traumatic injury.  Rule out ACS infection.  No signs of CVA at this time.  Will obtain labs EKG urine CT x-ray reassess    X-ray reviewed by myself.  Patient with left hip fracture.  Ortho paged will see patient    Spoke with HCP and updated on patients condition    ct appreciated trauma consulted will see patient shortly. 77-year-old female presents the ED from [facility] for unwitnessed fall.  As per triage note patient was reaching for heater while sitting on chair fell over onto left side.  EMS stated patient was on Coumadin however upon review of patient's medication list from [facility] Coumadin is not listed.  Here patient states that she did not syncopized she is unsure why she fell but states she was not able to get up off the floor.  States she has pain in her left hip left side of back.  Denies chest pain shortness of breath nausea vomiting headache neck pain.  Trauma alert was called upon arrival exam with + ttp to left chest wall, pelvis stable,+ ttp left hip +ttp left mid back. Patient with unwitnessed fall.  Will rule out traumatic injury.  Rule out ACS infection.  No signs of CVA at this time.  Will obtain labs EKG urine CT x-ray reassess    X-ray reviewed by myself.  Patient with left hip fracture.  Ortho paged will see patient    Spoke with HCP and updated on patients condition    ct appreciated trauma consulted will see patient shortly.    Seen by trauma and cleared as other fx are old - pt TBA medicine

## 2022-12-18 NOTE — CONSULT NOTE ADULT - SUBJECTIVE AND OBJECTIVE BOX
77-year-old female with h/o  chronic back pain VCFs s/p Kyphoplasty, GERD, anxiety, osteoporosis, and hepatic cysts presents the ED from [facility] after unwitnessed fall.  as per pt, she was reaching for heater while sitting on chair fell over onto left side.  She denies HT or LOC. C/O pain in her left hip and left side of back.  Denies chest pain shortness of breath nausea vomiting headache neck pain.  Pt endorses multiple falls in the last, the last one a few weeks ago.     Vital Signs Last 24 Hrs  T(C): 36.5 (18 Dec 2022 14:14), Max: 36.9 (18 Dec 2022 10:00)  T(F): 97.7 (18 Dec 2022 14:14), Max: 98.4 (18 Dec 2022 10:00)  HR: 66 (18 Dec 2022 14:14) (66 - 72)  BP: 129/52 (18 Dec 2022 14:14) (129/52 - 138/118)  BP(mean): 70 (18 Dec 2022 14:14) (70 - 70)  RR: 18 (18 Dec 2022 14:14) (16 - 18)  SpO2: 100% (18 Dec 2022 14:14) (95% - 100%)    Parameters below as of 18 Dec 2022 14:14  Patient On (Oxygen Delivery Method): room air      Physical Exam:  General: NAD, Alert, Awake and oriented  Neurologic: No gross deficits, moving all 4s.  Head: NCAT without abrasions, lacerations, or ecchymosis to head, face, or scalp  Eyes: PERRL  Neck/C-Spine: FAROM. No bony TTP.  T/L Spine: No new bony TTP, no palpable step-off.  Abdomen: soft, NT, ND  Extremities: FROM on RLE, LLE limited ROM due to pain in the L hip, B/L pedal pulses present, warm and well perfused extremities, tender at the L hip, nontender R hip                            11.9   12.25 )-----------( 202      ( 18 Dec 2022 07:39 )             36.5   12-18    142  |  109<H>  |  18  ----------------------------<  136<H>  3.8   |  26  |  0.78    Ca    8.7      18 Dec 2022 07:39  Mg     2.0     12-18    TPro  6.8  /  Alb  3.4  /  TBili  0.6  /  DBili  x   /  AST  17  /  ALT  26  /  AlkPhos  62  12-18    < from: US Duplex Venous Lower Ext Complete, Bilateral (12.18.22 @ 13:10) >  IMPRESSION:  Persistent DEEP venous thrombosis involving the LEFT femoral, popliteal,   and gastrocnemius veins. This was identified previously on January 29, 2020.    < end of copied text >  < from: CT Abdomen and Pelvis w/ IV Cont (12.18.22 @ 08:43) >  IMPRESSION:  Comminuted left intertrochanteric hip fracture.  Compression deformity T12 of indeterminate age.  Displaced proximal sacral fracture of indeterminate age    < end of copied text >   Trauma Consult, seen by Trauma staff 12/18/22 1030am    77-year-old female with h/o  chronic back pain VCFs s/p Kyphoplasty, GERD, anxiety, osteoporosis, and hepatic cysts presents the ED from [facility] after unwitnessed fall.  as per pt, she was reaching for heater while sitting on chair fell over onto left side.  She denies HT or LOC. C/O pain in her left hip and left side of back.  Denies chest pain shortness of breath nausea vomiting headache neck pain.  Pt endorses multiple falls in the last multiple months, the last one a few weeks ago.     ROS: as above otherwise negative    PAST MEDICAL HISTORY:  Anxiety     Chronic GERD     Hepatic cyst     Osteoporosis.     PAST SURGICAL HISTORY:  Status post lumbar spine operation.   Allergies:Allergies    No Known Drug Allergies  shellfish (Unknown)    Intolerances    SH: no reported etoh/tobacco/illicit drug use    FAMILY HISTORY:  FH: diabetes mellitus  FHx: leukemia, mother, age 50s.    Vital Signs Last 24 Hrs  T(C): 36.5 (18 Dec 2022 14:14), Max: 36.9 (18 Dec 2022 10:00)  T(F): 97.7 (18 Dec 2022 14:14), Max: 98.4 (18 Dec 2022 10:00)  HR: 66 (18 Dec 2022 14:14) (66 - 72)  BP: 129/52 (18 Dec 2022 14:14) (129/52 - 138/118)  BP(mean): 70 (18 Dec 2022 14:14) (70 - 70)  RR: 18 (18 Dec 2022 14:14) (16 - 18)  SpO2: 100% (18 Dec 2022 14:14) (95% - 100%)    Parameters below as of 18 Dec 2022 14:14  Patient On (Oxygen Delivery Method): room air      Physical Exam:  Psych: normal affect    General: NAD, Alert, Awake and oriented  Neurologic: No gross deficits, moving all 4s.  Head: NCAT without abrasions, lacerations, or ecchymosis to head, face, or scalp  Eyes: PERRL  C-Spine: FAROM. No bony TTP.  Neck: no JVD, no tracheal deviation  Resp: CTAB  CVS: s1s2+  T/L Spine: No new bony TTP, no palpable step-off.  Abdomen: soft, NT, ND  Extremities: FROM on RLE, LLE limited ROM due to pain in the L hip, B/L pedal pulses present, warm and well perfused extremities, tender at the L hip, nontender R hip  Skin: no adverse lesions appreciated                            11.9   12.25 )-----------( 202      ( 18 Dec 2022 07:39 )             36.5   12-18    142  |  109<H>  |  18  ----------------------------<  136<H>  3.8   |  26  |  0.78    Ca    8.7      18 Dec 2022 07:39  Mg     2.0     12-18    TPro  6.8  /  Alb  3.4  /  TBili  0.6  /  DBili  x   /  AST  17  /  ALT  26  /  AlkPhos  62  12-18    < from: US Duplex Venous Lower Ext Complete, Bilateral (12.18.22 @ 13:10) >  IMPRESSION:  Persistent DEEP venous thrombosis involving the LEFT femoral, popliteal,   and gastrocnemius veins. This was identified previously on January 29, 2020.    < end of copied text >  < from: CT Abdomen and Pelvis w/ IV Cont (12.18.22 @ 08:43) >  IMPRESSION:  Comminuted left intertrochanteric hip fracture.  Compression deformity T12 of indeterminate age.  Displaced proximal sacral fracture of indeterminate age    < end of copied text >

## 2022-12-18 NOTE — ED PROVIDER NOTE - OBJECTIVE STATEMENT
77-year-old female presents the ED from [facility] for unwitnessed fall.  As per triage note patient was reaching for heater while sitting on chair fell over onto left side.  EMS stated patient was on Coumadin however upon review of patient's medication list from [facility] Coumadin is not listed.  Here patient states that she did not syncopized she is unsure why she fell but states she was not able to get up off the floor.  States she has pain in her left hip left side of back.  Denies chest pain shortness of breath nausea vomiting headache neck pain.  Trauma alert was called upon arrival

## 2022-12-18 NOTE — INPATIENT CERTIFICATION FOR MEDICARE PATIENTS - IN ORDER TO MEET MEDICARE REQUIREMENTS.
In order to meet Medicare requirements, the clinical documentation must support the information cited in the admission order.  Please be sure to provide detailed and clear documentation about the following in the admitting note/history and physical:
Additional Safety/Bands:

## 2022-12-18 NOTE — ED ADULT NURSE NOTE - OBJECTIVE STATEMENT
pt presents to ed via ems from [facility] for evaluation of slip out of chair onto left hip- endorsing left hip pain and shortening, made trauma alert by triage due to blood thinner and possible disorientation. vss

## 2022-12-18 NOTE — ED ADULT TRIAGE NOTE - CHIEF COMPLAINT QUOTE
Pt BIBEMS from [facility] for unwitnessed fall. Pt states "I was sitting in my chair and was attempting to reach my heater when I fell. I did not hit my head or lose consciousness." Pt c/o left hip pain, endorses use of coumadin. Left leg observed to be shortened. As per EMS pt had moments of confusion en route- is currently A&Ox4. MD Dawson made aware- trauma alert called.

## 2022-12-18 NOTE — ED PROVIDER NOTE - NS ED ROS FT
Constitutional: No fever or chills  Eyes: No visual changes  HEENT: No throat pain  CV: No chest pain  Resp: No SOB no cough  GI: No abd pain, nausea or vomiting  : No dysuria  MSK: + left hip and back pain  Skin: No rash  Neuro: No headache

## 2022-12-18 NOTE — ED PROVIDER NOTE - PHYSICAL EXAMINATION
Constitutional: NAD    Eyes: PERRLA EOMI  Head: Normocephalic atraumatic  ENT: No eric sign, no raccoon eyes,    Mouth: MMM  Cardiac: regular rate   Resp: Lungs CTAB  GI: Abd s/nt/nd  Neuro: CN2-12 intact GCS 15 no neuro deficits  Skin: No rashes, no bruising to chest, back, abdomen or extremities  Msk: No midline spinal ttp,   no ttp of facial bones, + ttp to left chest wall, pelvis stable,+ ttp left hip +ttp left mid back

## 2022-12-18 NOTE — ED ADULT TRIAGE NOTE - RESPIRATORY RATE (BREATHS/MIN)
Patient with neck pain that comes and goes lasting seconds to minutes and associated with tingling and numbness on and off in the past few weeks.  We need to rule out cervical radiculopathy.  We will order an MRI   16

## 2022-12-18 NOTE — PATIENT PROFILE ADULT - FUNCTIONAL ASSESSMENT - BASIC MOBILITY 6.
4-calculated by average/Not able to assess (calculate score using [facility] averaging method)

## 2022-12-18 NOTE — PATIENT PROFILE ADULT - VISION (WITH CORRECTIVE LENSES IF THE PATIENT USUALLY WEARS THEM):
-- Message is from the [facility]--    Order Request  Lab: bloodwork    Message / reason: complete pannel                Preferred Delivery Method   Call when ready for pickup - phone number to notify: [phone]     Caller Information       Type Contact Phone    04/18/2019 09:49 AM Phone (Incoming) Marleni Castellano (Self) [phone] (H)          Alternative phone number: no    Turnaround time given to caller:   \"This message will be sent to [state Provider's name]. The clinical team will fulfill your request as soon as they review your message.\"   Normal vision: sees adequately in most situations; can see medication labels, newsprint

## 2022-12-18 NOTE — CONSULT NOTE ADULT - ASSESSMENT
78 yo female with L itntertrochanteric fx that needs orthopedic intervention  LLE DVT , on warfarin    There is no indication for IVC filter placement for LLE DVT since it is chronic  Can hold warfarin and restart after OR as per ortho   Please reconsult PRN for surgical needs    Plan discused with Dr Espinal

## 2022-12-18 NOTE — CONSULT NOTE ADULT - ASSESSMENT
78 yo female with L itntertrochanteric fx  H/O multiple falls  Possibly old T12 compression fx and sacral fx  LLE DVT , on warfarin    Ortho consult for intertroch fx on L   Pain control PRN  Patient has no contraindication to the required orthopedic surgery   She is cleared from a trauma surgical standpoint    Plan discussed with Dr Humphries 76 yo female with L itntertrochanteric fx  H/O multiple falls  Possibly old T12 compression fx and sacral fx  LLE DVT , on warfarin    Treatment of intertroch fx as per ortho  Pain control PRN  Patient has no contraindication to the required orthopedic surgery   She is cleared from a trauma surgical standpoint    Plan discussed with Dr Humphries

## 2022-12-19 LAB
ANION GAP SERPL CALC-SCNC: 5 MMOL/L — SIGNIFICANT CHANGE UP (ref 5–17)
APTT BLD: 42 SEC — HIGH (ref 27.5–35.5)
BUN SERPL-MCNC: 21 MG/DL — SIGNIFICANT CHANGE UP (ref 7–23)
CALCIUM SERPL-MCNC: 8.3 MG/DL — LOW (ref 8.5–10.1)
CHLORIDE SERPL-SCNC: 109 MMOL/L — HIGH (ref 96–108)
CO2 SERPL-SCNC: 29 MMOL/L — SIGNIFICANT CHANGE UP (ref 22–31)
CREAT SERPL-MCNC: 0.73 MG/DL — SIGNIFICANT CHANGE UP (ref 0.5–1.3)
EGFR: 85 ML/MIN/1.73M2 — SIGNIFICANT CHANGE UP
GLUCOSE SERPL-MCNC: 108 MG/DL — HIGH (ref 70–99)
HCT VFR BLD CALC: 30.1 % — LOW (ref 34.5–45)
HGB BLD-MCNC: 10 G/DL — LOW (ref 11.5–15.5)
INR BLD: 2.98 RATIO — HIGH (ref 0.88–1.16)
INR BLD: 3.36 RATIO — HIGH (ref 0.88–1.16)
MCHC RBC-ENTMCNC: 31.2 PG — SIGNIFICANT CHANGE UP (ref 27–34)
MCHC RBC-ENTMCNC: 33.2 GM/DL — SIGNIFICANT CHANGE UP (ref 32–36)
MCV RBC AUTO: 93.8 FL — SIGNIFICANT CHANGE UP (ref 80–100)
PLATELET # BLD AUTO: 182 K/UL — SIGNIFICANT CHANGE UP (ref 150–400)
POTASSIUM SERPL-MCNC: 3.8 MMOL/L — SIGNIFICANT CHANGE UP (ref 3.5–5.3)
POTASSIUM SERPL-SCNC: 3.8 MMOL/L — SIGNIFICANT CHANGE UP (ref 3.5–5.3)
PROTHROM AB SERPL-ACNC: 34.9 SEC — HIGH (ref 10.5–13.4)
PROTHROM AB SERPL-ACNC: 39.4 SEC — HIGH (ref 10.5–13.4)
RBC # BLD: 3.21 M/UL — LOW (ref 3.8–5.2)
RBC # FLD: 13.2 % — SIGNIFICANT CHANGE UP (ref 10.3–14.5)
SODIUM SERPL-SCNC: 143 MMOL/L — SIGNIFICANT CHANGE UP (ref 135–145)
WBC # BLD: 8.64 K/UL — SIGNIFICANT CHANGE UP (ref 3.8–10.5)
WBC # FLD AUTO: 8.64 K/UL — SIGNIFICANT CHANGE UP (ref 3.8–10.5)

## 2022-12-19 PROCEDURE — 99233 SBSQ HOSP IP/OBS HIGH 50: CPT

## 2022-12-19 RX ORDER — SODIUM CHLORIDE 9 MG/ML
500 INJECTION, SOLUTION INTRAVENOUS
Refills: 0 | Status: DISCONTINUED | OUTPATIENT
Start: 2022-12-19 | End: 2022-12-22

## 2022-12-19 RX ADMIN — Medication 0.25 MILLIGRAM(S): at 12:03

## 2022-12-19 RX ADMIN — LIDOCAINE 1 PATCH: 4 CREAM TOPICAL at 12:03

## 2022-12-19 RX ADMIN — PANTOPRAZOLE SODIUM 40 MILLIGRAM(S): 20 TABLET, DELAYED RELEASE ORAL at 05:40

## 2022-12-19 RX ADMIN — Medication 0.25 MILLIGRAM(S): at 20:19

## 2022-12-19 RX ADMIN — Medication 650 MILLIGRAM(S): at 15:50

## 2022-12-19 RX ADMIN — Medication 650 MILLIGRAM(S): at 14:47

## 2022-12-19 RX ADMIN — GABAPENTIN 300 MILLIGRAM(S): 400 CAPSULE ORAL at 20:19

## 2022-12-19 RX ADMIN — LIDOCAINE 1 PATCH: 4 CREAM TOPICAL at 20:26

## 2022-12-19 RX ADMIN — Medication 10 MILLIGRAM(S): at 12:03

## 2022-12-19 RX ADMIN — HEPARIN SODIUM 5000 UNIT(S): 5000 INJECTION INTRAVENOUS; SUBCUTANEOUS at 05:40

## 2022-12-19 RX ADMIN — ESCITALOPRAM OXALATE 10 MILLIGRAM(S): 10 TABLET, FILM COATED ORAL at 12:04

## 2022-12-19 NOTE — PROGRESS NOTE ADULT - SUBJECTIVE AND OBJECTIVE BOX
c/c: Fall/left hip pain    HPI:  77F, pmh of chronic back pain VCFs s/p Kyphoplasty,  LLE DVT on coumadin,, GERD, anxiety, osteoporosis, and hepatic cysts presents the ED from [facility] after unwitnessed fall.  As per triage note patient was reaching for heater while sitting on chair fell over onto left side.  In ED, stated she did not pass out. She was unsure why she fell but states she was not able to get up off the floor.  States she has pain in her left hip left side of back.  Denies chest pain shortness of breath nausea vomiting headache neck pain.  Trauma alert was called upon arrival.  does endorse multiple falls in the past year.    In ED, noted to have left intertrochanteric hip fracture and admitted.     pt seen and examined this am. Minimal pain at rest. no sob/chest pain. states she developed LLE DVT after spine surgery. Initially was on xarelto. Per review of records, was readmitted a month later and found to have DVT LLE but it was unclear if this was worse/same or better than initial DVT a month earlier. She was bridged to coumadin and discharged.   She denies h/o stroke/mi. No sob/chest pain. No f/c/r. no cough/sputum. no dysuria.       Review of system- All 10 systems reviewed and is as per HPI otherwise negative.       Vital Signs Last 24 Hrs  T(C): 36.8 (19 Dec 2022 08:24), Max: 36.8 (18 Dec 2022 20:13)  T(F): 98.2 (19 Dec 2022 08:24), Max: 98.2 (18 Dec 2022 20:13)  HR: 91 (19 Dec 2022 09:55) (66 - 119)  BP: 104/43 (19 Dec 2022 09:55) (88/64 - 129/52)  BP(mean): 74 (18 Dec 2022 20:13) (70 - 74)  RR: 18 (19 Dec 2022 08:24) (17 - 18)  SpO2: 92% (19 Dec 2022 08:24) (92% - 100%)    Parameters below as of 19 Dec 2022 08:24  Patient On (Oxygen Delivery Method): room air      PHYSICAL EXAM:    GENERAL: Comfortable, no acute distress  HEAD:  Atraumatic, Normocephalic  EYES: EOMI, PERRLA  HEENT: Moist mucous membranes  NECK: Supple, No JVD  NERVOUS SYSTEM:  Alert & Oriented X3, Motor Strength 5/5 B/L upper and lower extremities  CHEST/LUNG: Clear to auscultation bilaterally  HEART: regular rate and rhythm.   ABDOMEN: Soft, Nontender, Nondistended; Bowel sounds present  GENITOURINARY- Voiding, no palpable bladder  EXTREMITIES:  No clubbing, cyanosis, +LLE edema.   MUSCULOSKELETAL- swelling left hip  SKIN-no rash        LABS:                        10.0   8.64  )-----------( 182      ( 19 Dec 2022 03:43 )             30.1     12-    143  |  109<H>  |  21  ----------------------------<  108<H>  3.8   |  29  |  0.73    Ca    8.3<L>      19 Dec 2022 03:43  Mg     2.0     12-18    TPro  6.8  /  Alb  3.4  /  TBili  0.6  /  DBili  x   /  AST  17  /  ALT  26  /  AlkPhos  62  12-18    PT/INR - ( 19 Dec 2022 03:43 )   PT: 39.4 sec;   INR: 3.36 ratio         PTT - ( 19 Dec 2022 03:43 )  PTT:42.0 sec  Urinalysis Basic - ( 18 Dec 2022 09:11 )    Color: Yellow / Appearance: Slightly Turbid / S.005 / pH: x  Gluc: x / Ketone: Small  / Bili: Negative / Urobili: Negative   Blood: x / Protein: Trace mg/dL / Nitrite: Negative   Leuk Esterase: Trace / RBC: 25-50 /HPF / WBC 3-5 /HPF   Sq Epi: x / Non Sq Epi: Few / Bacteria: Few     US Duplex Venous Lower Ext Complete, Bilateral (12.18. @ 13:10) >  IMPRESSION:  Persistent DEEP venous thrombosis involving the LEFT femoral, popliteal,   and gastrocnemius veins. This was identified previously on 2020.      MEDS  acetaminophen     Tablet .. 650 milliGRAM(s) Oral every 6 hours PRN  clonazePAM Oral Disintegrating Tablet 0.25 milliGRAM(s) Oral two times a day  escitalopram 10 milliGRAM(s) Oral daily  gabapentin 300 milliGRAM(s) Oral at bedtime  lactated ringers. 1000 milliLiter(s) IV Continuous <Continuous>  lactated ringers. 500 milliLiter(s) IV Continuous <Continuous>  lidocaine   4% Patch 1 Patch Transdermal daily  morphine  - Injectable 2 milliGRAM(s) IV Push every 6 hours PRN  oxybutynin 10 milliGRAM(s) Oral daily  pantoprazole    Tablet 40 milliGRAM(s) Oral before breakfast    ASSESSMENT AND PLAN:  77f, PMH as above a/w    1. Left hip fracture:  -for OR tomorrow.   -repeat INR at 2pm. if continues to increase, will order oral vitamin K.   -otherwise, no medical contraindications for OR at this time.   -pain control   -physical therapy post opo  -incentive spirometry  -bowel regimen.     2. Persistent LLE dvt:  -seen by vascular who felt pt did not have any indication for IVC filter.   -coumadin on hold for OR.   -resume a/c post op.     3. Anxiety/depression:  -continue klonipin/escitalopram.     4. OAB:  -hold oxybutinin for now.     5. DVT px:  -inr supratherapeutic, dc heparin sq.    d/w pt/ortho

## 2022-12-19 NOTE — PROGRESS NOTE ADULT - SUBJECTIVE AND OBJECTIVE BOX
Patient seen and examined at bedside this AM.  No acute complaints at this time. Pain well controlled. Denies chest pain, shortness of breath, nausea or vomiting. Denies numbness/tingling b/l LEs.     PE:  Vital Signs Last 24 Hrs  T(C): 36.4 (12-19-22 @ 05:03), Max: 36.9 (12-18-22 @ 10:00)  T(F): 97.6 (12-19-22 @ 05:03), Max: 98.4 (12-18-22 @ 10:00)  HR: 96 (12-19-22 @ 05:03) (66 - 96)  BP: 104/48 (12-19-22 @ 05:03) (104/48 - 138/118)  BP(mean): 74 (12-18-22 @ 20:13) (70 - 74)  RR: 17 (12-19-22 @ 05:03) (16 - 18)  SpO2: 96% (12-19-22 @ 05:03) (95% - 100%)    General: NAD, resting comfortably in bed  LLE:   Compartments soft and compressible  No calf tenderness bilaterally  +TA/EHL/FHL/GSC  SILT L3-S1  + DP/PT                            10.0   8.64  )-----------( 182      ( 19 Dec 2022 03:43 )             30.1     12-19    143  |  109<H>  |  21  ----------------------------<  108<H>  3.8   |  29  |  0.73    Ca    8.3<L>      19 Dec 2022 03:43  Mg     2.0     12-18    TPro  6.8  /  Alb  3.4  /  TBili  0.6  /  DBili  x   /  AST  17  /  ALT  26  /  AlkPhos  62  12-18    PT/INR - ( 19 Dec 2022 03:43 )   PT: 39.4 sec;   INR: 3.36 ratio         PTT - ( 19 Dec 2022 03:43 )  PTT:42.0 sec        A/P: 77y Female with L basicervical femoral neck hip fracture    Plan for IMN of the Left IT Fx Tuesday 12/20 with Dr. Ponce  Will need to be made NPO after midnight 12/19  IVF while NPO  Please document risk stratification for OR   Hold chemical VTE ppx for OR 12/20  SCDs OK  Needs Vitamin K, would start w/ 3mg IV and recheck INR 6 hrs after, with re=dosing vit k as needed; will monitor INR --> inc from 2/71 on admission to 3.36 this AM  BLLE LE Dopp: Chr / persistent L Fem Vein, Pop, Gastroc V DVT  VSx c/s appreciated, no filter indicated  CXR/EKG obtained  Preop labs ordered  Imaging reviewed  Hx of numerous vcfs, chronic, no back pain on exam   Pain control  NWB LLE, bedrest  FU labs/imaging  Admit to medical team  Will discuss with attending Dr. Ponce  Will advise if plan changes

## 2022-12-20 ENCOUNTER — TRANSCRIPTION ENCOUNTER (OUTPATIENT)
Age: 77
End: 2022-12-20

## 2022-12-20 ENCOUNTER — APPOINTMENT (OUTPATIENT)
Dept: CARDIOLOGY | Facility: CLINIC | Age: 77
End: 2022-12-20

## 2022-12-20 LAB
24R-OH-CALCIDIOL SERPL-MCNC: 38.1 NG/ML — SIGNIFICANT CHANGE UP (ref 30–80)
ANION GAP SERPL CALC-SCNC: 10 MMOL/L — SIGNIFICANT CHANGE UP (ref 5–17)
ANION GAP SERPL CALC-SCNC: 7 MMOL/L — SIGNIFICANT CHANGE UP (ref 5–17)
APTT BLD: 32.6 SEC — SIGNIFICANT CHANGE UP (ref 27.5–35.5)
BUN SERPL-MCNC: 13 MG/DL — SIGNIFICANT CHANGE UP (ref 7–23)
BUN SERPL-MCNC: 15 MG/DL — SIGNIFICANT CHANGE UP (ref 7–23)
CALCIUM SERPL-MCNC: 7.9 MG/DL — LOW (ref 8.5–10.1)
CALCIUM SERPL-MCNC: 8 MG/DL — LOW (ref 8.5–10.1)
CHLORIDE SERPL-SCNC: 108 MMOL/L — SIGNIFICANT CHANGE UP (ref 96–108)
CHLORIDE SERPL-SCNC: 108 MMOL/L — SIGNIFICANT CHANGE UP (ref 96–108)
CO2 SERPL-SCNC: 25 MMOL/L — SIGNIFICANT CHANGE UP (ref 22–31)
CO2 SERPL-SCNC: 27 MMOL/L — SIGNIFICANT CHANGE UP (ref 22–31)
CREAT SERPL-MCNC: 0.6 MG/DL — SIGNIFICANT CHANGE UP (ref 0.5–1.3)
CREAT SERPL-MCNC: 0.63 MG/DL — SIGNIFICANT CHANGE UP (ref 0.5–1.3)
EGFR: 92 ML/MIN/1.73M2 — SIGNIFICANT CHANGE UP
EGFR: 93 ML/MIN/1.73M2 — SIGNIFICANT CHANGE UP
GLUCOSE SERPL-MCNC: 129 MG/DL — HIGH (ref 70–99)
GLUCOSE SERPL-MCNC: 148 MG/DL — HIGH (ref 70–99)
HCT VFR BLD CALC: 27.3 % — LOW (ref 34.5–45)
HCT VFR BLD CALC: 27.9 % — LOW (ref 34.5–45)
HGB BLD-MCNC: 9 G/DL — LOW (ref 11.5–15.5)
HGB BLD-MCNC: 9.2 G/DL — LOW (ref 11.5–15.5)
INR BLD: 1.44 RATIO — HIGH (ref 0.88–1.16)
INR BLD: 2 RATIO — HIGH (ref 0.88–1.16)
MCHC RBC-ENTMCNC: 31.1 PG — SIGNIFICANT CHANGE UP (ref 27–34)
MCHC RBC-ENTMCNC: 31.3 PG — SIGNIFICANT CHANGE UP (ref 27–34)
MCHC RBC-ENTMCNC: 33 GM/DL — SIGNIFICANT CHANGE UP (ref 32–36)
MCHC RBC-ENTMCNC: 33 GM/DL — SIGNIFICANT CHANGE UP (ref 32–36)
MCV RBC AUTO: 94.3 FL — SIGNIFICANT CHANGE UP (ref 80–100)
MCV RBC AUTO: 94.8 FL — SIGNIFICANT CHANGE UP (ref 80–100)
PLATELET # BLD AUTO: 178 K/UL — SIGNIFICANT CHANGE UP (ref 150–400)
PLATELET # BLD AUTO: 187 K/UL — SIGNIFICANT CHANGE UP (ref 150–400)
POTASSIUM SERPL-MCNC: 3.7 MMOL/L — SIGNIFICANT CHANGE UP (ref 3.5–5.3)
POTASSIUM SERPL-MCNC: 3.8 MMOL/L — SIGNIFICANT CHANGE UP (ref 3.5–5.3)
POTASSIUM SERPL-SCNC: 3.7 MMOL/L — SIGNIFICANT CHANGE UP (ref 3.5–5.3)
POTASSIUM SERPL-SCNC: 3.8 MMOL/L — SIGNIFICANT CHANGE UP (ref 3.5–5.3)
PROTHROM AB SERPL-ACNC: 16.8 SEC — HIGH (ref 10.5–13.4)
PROTHROM AB SERPL-ACNC: 23.4 SEC — HIGH (ref 10.5–13.4)
RBC # BLD: 2.88 M/UL — LOW (ref 3.8–5.2)
RBC # BLD: 2.96 M/UL — LOW (ref 3.8–5.2)
RBC # FLD: 12.8 % — SIGNIFICANT CHANGE UP (ref 10.3–14.5)
RBC # FLD: 13 % — SIGNIFICANT CHANGE UP (ref 10.3–14.5)
SODIUM SERPL-SCNC: 142 MMOL/L — SIGNIFICANT CHANGE UP (ref 135–145)
SODIUM SERPL-SCNC: 143 MMOL/L — SIGNIFICANT CHANGE UP (ref 135–145)
WBC # BLD: 9.07 K/UL — SIGNIFICANT CHANGE UP (ref 3.8–10.5)
WBC # BLD: 9.47 K/UL — SIGNIFICANT CHANGE UP (ref 3.8–10.5)
WBC # FLD AUTO: 9.07 K/UL — SIGNIFICANT CHANGE UP (ref 3.8–10.5)
WBC # FLD AUTO: 9.47 K/UL — SIGNIFICANT CHANGE UP (ref 3.8–10.5)

## 2022-12-20 PROCEDURE — 99233 SBSQ HOSP IP/OBS HIGH 50: CPT

## 2022-12-20 PROCEDURE — 99223 1ST HOSP IP/OBS HIGH 75: CPT

## 2022-12-20 RX ORDER — HEPARIN SODIUM 5000 [USP'U]/ML
5000 INJECTION INTRAVENOUS; SUBCUTANEOUS EVERY 8 HOURS
Refills: 0 | Status: DISCONTINUED | OUTPATIENT
Start: 2022-12-21 | End: 2022-12-25

## 2022-12-20 RX ORDER — ACETAMINOPHEN 500 MG
1000 TABLET ORAL ONCE
Refills: 0 | Status: COMPLETED | OUTPATIENT
Start: 2022-12-20 | End: 2022-12-22

## 2022-12-20 RX ORDER — PHYTONADIONE (VIT K1) 5 MG
2.5 TABLET ORAL ONCE
Refills: 0 | Status: COMPLETED | OUTPATIENT
Start: 2022-12-20 | End: 2022-12-20

## 2022-12-20 RX ORDER — FENTANYL CITRATE 50 UG/ML
50 INJECTION INTRAVENOUS
Refills: 0 | Status: DISCONTINUED | OUTPATIENT
Start: 2022-12-20 | End: 2022-12-20

## 2022-12-20 RX ORDER — SODIUM CHLORIDE 9 MG/ML
1000 INJECTION INTRAMUSCULAR; INTRAVENOUS; SUBCUTANEOUS
Refills: 0 | Status: DISCONTINUED | OUTPATIENT
Start: 2022-12-20 | End: 2022-12-22

## 2022-12-20 RX ORDER — ONDANSETRON 8 MG/1
4 TABLET, FILM COATED ORAL EVERY 6 HOURS
Refills: 0 | Status: DISCONTINUED | OUTPATIENT
Start: 2022-12-20 | End: 2022-12-27

## 2022-12-20 RX ORDER — ONDANSETRON 8 MG/1
4 TABLET, FILM COATED ORAL ONCE
Refills: 0 | Status: DISCONTINUED | OUTPATIENT
Start: 2022-12-20 | End: 2022-12-20

## 2022-12-20 RX ORDER — OXYCODONE HYDROCHLORIDE 5 MG/1
5 TABLET ORAL ONCE
Refills: 0 | Status: DISCONTINUED | OUTPATIENT
Start: 2022-12-20 | End: 2022-12-20

## 2022-12-20 RX ORDER — ACETAMINOPHEN 500 MG
975 TABLET ORAL EVERY 8 HOURS
Refills: 0 | Status: DISCONTINUED | OUTPATIENT
Start: 2022-12-20 | End: 2022-12-27

## 2022-12-20 RX ORDER — TRAMADOL HYDROCHLORIDE 50 MG/1
50 TABLET ORAL EVERY 6 HOURS
Refills: 0 | Status: DISCONTINUED | OUTPATIENT
Start: 2022-12-20 | End: 2022-12-27

## 2022-12-20 RX ORDER — SENNA PLUS 8.6 MG/1
2 TABLET ORAL AT BEDTIME
Refills: 0 | Status: DISCONTINUED | OUTPATIENT
Start: 2022-12-20 | End: 2022-12-27

## 2022-12-20 RX ORDER — POLYETHYLENE GLYCOL 3350 17 G/17G
17 POWDER, FOR SOLUTION ORAL AT BEDTIME
Refills: 0 | Status: DISCONTINUED | OUTPATIENT
Start: 2022-12-20 | End: 2022-12-27

## 2022-12-20 RX ORDER — OXYCODONE HYDROCHLORIDE 5 MG/1
5 TABLET ORAL EVERY 4 HOURS
Refills: 0 | Status: DISCONTINUED | OUTPATIENT
Start: 2022-12-20 | End: 2022-12-27

## 2022-12-20 RX ORDER — OXYCODONE HYDROCHLORIDE 5 MG/1
10 TABLET ORAL EVERY 4 HOURS
Refills: 0 | Status: DISCONTINUED | OUTPATIENT
Start: 2022-12-20 | End: 2022-12-27

## 2022-12-20 RX ORDER — ACETAMINOPHEN 500 MG
1000 TABLET ORAL ONCE
Refills: 0 | Status: COMPLETED | OUTPATIENT
Start: 2022-12-20 | End: 2022-12-20

## 2022-12-20 RX ORDER — LANOLIN ALCOHOL/MO/W.PET/CERES
3 CREAM (GRAM) TOPICAL AT BEDTIME
Refills: 0 | Status: DISCONTINUED | OUTPATIENT
Start: 2022-12-20 | End: 2022-12-27

## 2022-12-20 RX ORDER — BENZOCAINE AND MENTHOL 5; 1 G/100ML; G/100ML
1 LIQUID ORAL AT BEDTIME
Refills: 0 | Status: DISCONTINUED | OUTPATIENT
Start: 2022-12-20 | End: 2022-12-27

## 2022-12-20 RX ORDER — MAGNESIUM HYDROXIDE 400 MG/1
30 TABLET, CHEWABLE ORAL DAILY
Refills: 0 | Status: DISCONTINUED | OUTPATIENT
Start: 2022-12-20 | End: 2022-12-27

## 2022-12-20 RX ORDER — SODIUM CHLORIDE 9 MG/ML
1000 INJECTION, SOLUTION INTRAVENOUS
Refills: 0 | Status: DISCONTINUED | OUTPATIENT
Start: 2022-12-20 | End: 2022-12-20

## 2022-12-20 RX ADMIN — LIDOCAINE 1 PATCH: 4 CREAM TOPICAL at 00:57

## 2022-12-20 RX ADMIN — Medication 975 MILLIGRAM(S): at 22:04

## 2022-12-20 RX ADMIN — Medication 0.25 MILLIGRAM(S): at 21:34

## 2022-12-20 RX ADMIN — Medication 400 MILLIGRAM(S): at 11:20

## 2022-12-20 RX ADMIN — Medication 3 MILLIGRAM(S): at 21:35

## 2022-12-20 RX ADMIN — GABAPENTIN 300 MILLIGRAM(S): 400 CAPSULE ORAL at 21:35

## 2022-12-20 RX ADMIN — MORPHINE SULFATE 2 MILLIGRAM(S): 50 CAPSULE, EXTENDED RELEASE ORAL at 11:00

## 2022-12-20 RX ADMIN — Medication 975 MILLIGRAM(S): at 21:34

## 2022-12-20 RX ADMIN — Medication 2.5 MILLIGRAM(S): at 01:12

## 2022-12-20 RX ADMIN — FENTANYL CITRATE 50 MICROGRAM(S): 50 INJECTION INTRAVENOUS at 16:34

## 2022-12-20 RX ADMIN — Medication 0.25 MILLIGRAM(S): at 10:34

## 2022-12-20 RX ADMIN — MORPHINE SULFATE 2 MILLIGRAM(S): 50 CAPSULE, EXTENDED RELEASE ORAL at 10:34

## 2022-12-20 RX ADMIN — SODIUM CHLORIDE 125 MILLILITER(S): 9 INJECTION INTRAMUSCULAR; INTRAVENOUS; SUBCUTANEOUS at 11:21

## 2022-12-20 RX ADMIN — MORPHINE SULFATE 2 MILLIGRAM(S): 50 CAPSULE, EXTENDED RELEASE ORAL at 20:35

## 2022-12-20 RX ADMIN — MORPHINE SULFATE 2 MILLIGRAM(S): 50 CAPSULE, EXTENDED RELEASE ORAL at 20:55

## 2022-12-20 RX ADMIN — Medication 1000 MILLIGRAM(S): at 12:00

## 2022-12-20 RX ADMIN — SENNA PLUS 2 TABLET(S): 8.6 TABLET ORAL at 21:34

## 2022-12-20 NOTE — PROGRESS NOTE ADULT - SUBJECTIVE AND OBJECTIVE BOX
c/c: Fall/left hip pain    HPI:  77F, pmh of chronic back pain VCFs s/p Kyphoplasty,  LLE DVT on coumadin,, GERD, anxiety, osteoporosis, and hepatic cysts presents the ED from [facility] after unwitnessed fall.  As per triage note patient was reaching for heater while sitting on chair fell over onto left side.  In ED, stated she did not pass out. She was unsure why she fell but states she was not able to get up off the floor.  States she has pain in her left hip left side of back.  Denies chest pain shortness of breath nausea vomiting headache neck pain.  Trauma alert was called upon arrival.  does endorse multiple falls in the past year.    In ED, noted to have left intertrochanteric hip fracture and admitted.   states she developed LLE DVT after spine surgery. Initially was on xarelto. Per review of records, was readmitted a month later and found to have DVT LLE but it was unclear if this was worse/same or better than initial DVT a month earlier. She was bridged to coumadin and discharged.   She denies h/o stroke/mi. No sob/chest pain. No f/c/r. no cough/sputum. no dysuria.     pt seen and examined this am. c/o pain left hip . no sob/chest pain. Awaiting OR later today. NO f/c/r. no n/v. NO difficulty voiding. Urine appears concentrated.      Review of system- All 10 systems reviewed and is as per HPI otherwise negative.     Vital Signs Last 24 Hrs  T(C): 36.9 (20 Dec 2022 08:45), Max: 37.3 (20 Dec 2022 00:09)  T(F): 98.4 (20 Dec 2022 08:45), Max: 99.2 (20 Dec 2022 00:09)  HR: 81 (20 Dec 2022 08:45) (81 - 88)  BP: 136/80 (20 Dec 2022 08:45) (106/44 - 136/80)  RR: 17 (20 Dec 2022 08:45) (17 - 18)  SpO2: 97% (20 Dec 2022 08:45) (96% - 97%)    Parameters below as of 20 Dec 2022 08:45  Patient On (Oxygen Delivery Method): room air      PHYSICAL EXAM:    GENERAL: Comfortable, no acute distress  HEAD:  Atraumatic, Normocephalic  EYES: EOMI, PERRLA  HEENT: Moist mucous membranes  NECK: Supple, No JVD  NERVOUS SYSTEM:  Alert & Oriented X3, Motor Strength 5/5 B/L upper and lower extremities  CHEST/LUNG: Clear to auscultation bilaterally  HEART: regular rate and rhythm.   ABDOMEN: Soft, Nontender, Nondistended; Bowel sounds present  GENITOURINARY- Voiding, no palpable bladder  EXTREMITIES:  No clubbing, cyanosis, +LLE edema.   MUSCULOSKELETAL- swelling left hip  SKIN-no rash      LABS:                        9.0    9.07  )-----------( 178      ( 20 Dec 2022 04:35 )             27.3     12-20    142  |  108  |  15  ----------------------------<  129<H>  3.7   |  27  |  0.60    Ca    8.0<L>      20 Dec 2022 04:35    TPro  x   /  Alb  2.5<L>  /  TBili  x   /  DBili  x   /  AST  x   /  ALT  x   /  AlkPhos  x   12-20    PT/INR - ( 20 Dec 2022 12:36 )   PT: 16.8 sec;   INR: 1.44 ratio         PTT - ( 20 Dec 2022 04:35 )  PTT:32.6 sec      US Duplex Venous Lower Ext Complete, Bilateral (12.18.22 @ 13:10) >  IMPRESSION:  Persistent DEEP venous thrombosis involving the LEFT femoral, popliteal,   and gastrocnemius veins. This was identified previously on January 29, 2020.      MEDICATIONS  (STANDING):  clonazePAM Oral Disintegrating Tablet 0.25 milliGRAM(s) Oral two times a day  escitalopram 10 milliGRAM(s) Oral daily  gabapentin 300 milliGRAM(s) Oral at bedtime  lactated ringers. 500 milliLiter(s) (500 mL/Hr) IV Continuous <Continuous>  lidocaine   4% Patch 1 Patch Transdermal daily  oxybutynin 10 milliGRAM(s) Oral daily  pantoprazole    Tablet 40 milliGRAM(s) Oral before breakfast  sodium chloride 0.9%. 1000 milliLiter(s) (125 mL/Hr) IV Continuous <Continuous>    MEDICATIONS  (PRN):  acetaminophen     Tablet .. 650 milliGRAM(s) Oral every 6 hours PRN Temp greater or equal to 38C (100.4F), Mild Pain (1 - 3)  morphine  - Injectable 2 milliGRAM(s) IV Push every 6 hours PRN Moderate Pain (4 - 6)    ASSESSMENT AND PLAN:  77f, PMH as above a/w    1. Left hip fracture:  -for OR today  -INR acceptable  -no medical contraindications for OR at this time.   -pain control   -physical therapy post opo  -incentive spirometry  -bowel regimen.     2. Acute blood loss anemia:  -likely related to hematoma at fracture site.   -monitor cbc.    3. Persistent LLE dvt:  -seen by vascular who felt pt did not have any indication for IVC filter.   -coumadin on hold for OR.   -resume a/c post op asap    4. Anxiety/depression:  -continue klonipin/escitalopram.     5. OAB:  -hold oxybutinin for now.     6. DVT px:    -on hold for OR    d/w pt/ortho

## 2022-12-20 NOTE — DISCHARGE NOTE PROVIDER - NSDCFUADDINST_GEN_ALL_CORE_FT
[facility] DC Instructions:    1. ACTIVITY: Weight bearing as tolerated with assistive devices (i.e. cane/walker).  2. DVT/PE Prophylaxis: Continue anticoagulation medication per AC team. See Med Rec for duration and dose.  3. PHYSICAL THERAPY: You should receive physical therapy daily.   4. STAPLES: Your staples are to be removed on post-operative day #14 ( 1/3/23 )  5. BANDAGE: Change bandage on POD7 to dry gauze and tegaderm or paper tape. Can also change PRN if saturated. Do NOT remove on arrival to inspect wound.  6. FOLLOW UP: Follow up outpatient with your Orthopedic Surgeon Dr. Ponce in 1 month. Perform portable xray of extremity at Rehab postoperative day #14 before follow up.         IM [facility] DC Instructions:    1. ACTIVITY: Weight Bearing as Tolerated with assistance and rolling walker  2. DVT: Continue DVT/PE Prophylaxis. See Med Rec for Duration and dose.  3. PT: daily  4. FOLLOW UP: Orthopedic Surgeon DR PONCE in  30 days. Call Office For Appointment.  5. WOUND CARE: STAPLES: Remove by RN POD14 ([id number])    6. BANDAGE MEPILEX:  IF SATURATED Change bandage on POD7 (DEC27) to dry gauze and tegaderm or paper tape. Do NOT remove on arrival to inspect wound.   7. For Dr. Ponce, follow up in 1 month. And perform Portable Xray of Extremity at Rehab POD14 before follow up.

## 2022-12-20 NOTE — CONSULT NOTE ADULT - SUBJECTIVE AND OBJECTIVE BOX
HPI:  77-year-old female with h/o  chronic back pain VCFs s/p Kyphoplasty, GERD, anxiety, osteoporosis, and hepatic cysts, LEFT LE DVT on coumadin,  presents the ED from [facility] after unwitnessed fall.  As per triage note patient was reaching for heater while sitting on chair fell over onto left side.  Here patient states that she did not syncopized she is unsure why she fell but states she was not able to get up off the floor.  States she has pain in her left hip left side of back.  Denies chest pain shortness of breath nausea vomiting headache neck pain.  Trauma alert was called upon arrival.  does endorses multiple falls in the past year.      In ED, noted to have left intertrochanteric hip fracture.  [id number] (18 Dec 2022 13:12)      Patient is a 77y old  Female who presents with a chief complaint of fall (20 Dec 2022 08:25)      Consulted by Dr. Teja Ponce  for VTE prophylaxis, risk stratification, and anticoagulation management.    PAST MEDICAL & SURGICAL HISTORY:  Anxiety  left le DVT on coumadin    Chronic GERD      Osteoporosis      Hepatic cyst      Status post lumbar spine operation          FAMILY HISTORY:  FH: diabetes mellitus    FHx: leukemia  mother, age 50s        Interval Note:  22: Pt seen at bedside [id number]. Pt knows her name and that her hip hurts.  Do not remember falling.  Pt made aware she will need prophylactic Anticoagulation post procedure.  Will need more reinforcement.  Pt states she takes coumadin.  Pt made aware we will restart her coumadin when stable post procedure.         CAPRINI SCORE  AGE RELATED RISK FACTORS                                                       MOBILITY RELATED FACTORS  [ ] Age 41-60 years                                            (1 Point)                  [ ] Bed rest                                                        (1 Point)  [ ] Age: 61-74 years                                           (2 Points)                [ ] Plaster cast                                                   (2 Points)  [x ] Age= 75 years                                              (3 Points)                 [ ] Bed bound for more than 72 hours                   (2 Points)    DISEASE RELATED RISK FACTORS                                               GENDER SPECIFIC FACTORS  [ ] Edema in the lower extremities                       (1 Point)           [ ] Pregnancy                                                            (1 Point)  [ ] Varicose veins                                               (1 Point)                  [ ] Post-partum < 6 weeks                                      (1 Point)             [ ] BMI > 25 Kg/m2                                            (1 Point)                  [ ] Hormonal therapy or oral contraception       (1 Point)                 [ ] Sepsis (in the previous month)                        (1 Point)             [ ] History of pregnancy complications                (1Point)  [ ] Pneumonia or serious lung disease                                             [ ] Unexplained or recurrent  (=/>3), premature                                 (In the previous month)                               (1 Point)                birth with toxemia or growth-restricted infant (1 Point)  [ ] Abnormal pulmonary function test            (1 Point)                                   SURGERY RELATED RISK FACTORS  [ ] Acute myocardial infarction                       (1 Point)                  [ ]  Section                                         (1 Point)  [ ] Congestive heart failure (in the previous month) (1 Point)   [ ] Minor surgery   lasting <45 minutes       (1 Point)   [ ] Inflammatory bowel disease                             (1 Point)          [ ] Arthroscopic surgery                                  (2 Points)  [ ] Central venous access                                    (2 Points)            [ ] General surgery lasting >45 minutes      (2 Points)       [ ] Stroke (in the previous month)                  (5 Points)            [ ] Elective major lower extremity arthroplasty (5 Points)                                   [  ] Malignancy (present or past include skin melanoma                                          but exclude  basal skin cell)    (2 points)                                      TRAUMA RELATED RISK FACTORS                HEMATOLOGY RELATED FACTORS                                  [x ] Fracture of the hip, pelvis, or leg                       (5 Points)  [x ] Prior episodes of VTE                                     (3 Points)          [ ] Acute spinal cord injury (in the previous month)  (5 Points)  [ ] Positive family history for VTE                         (3 Points)       [ ] Paralysis (less than 1 month)                          (5 Points)  [ ] Prothrombin 07267 A                                      (3 Points)         [ ] Multiple Trauma (within 1month)                 (5Points)                                                                                                                                                                [ ] Factor V Leiden                                          (3 Points)                                OTHER RISK FACTORS                          [ ] Lupus anticoagulants                                     (3 Points)                       [ ] BMI > 40                          (1 Point)                                                         [ ] Anticardiolipin antibodies                                (3 Points)                   [ ] Smoking                              (1Point)                                                [ ] High homocysteine in the blood                      (3 Points)                [  ] Diabetes requiring insulin (1point)                         [ ] Other congenital or acquired thrombophilia       (3 Points)          [  ] Chemotherapy                   (1 Point)  [ ] Heparin induced thrombocytopenia                  (3 Points)             [  ] Blood Transfusion                (1 point)                                                                                                             Total Score [ 11 ]                                                                                                                                                                                                                                                                                                                                                                                                                                         IMPROVE Bleeding Risk Score: 1.5    Falls Risk:   High (x  )  Mod (  )  Low (  )  crcl: 72        cr: .60         BMI: 21.1            EBL: pending   ml  Time procedure    : starts: Pending              :ends: Pending      Denies any personal or familial history of clotting or bleeding disorders.    Allergies    No Known Drug Allergies  shellfish (Unknown)    Intolerances        REVIEW OF SYSTEMS    (  )Fever	     (  )Constipation	(  )SOB				(  )Headache	(  )Dysuria  (  )Chills	     (  )Melena	(  )Dyspnea present on exertion	                    (  )Dizziness                    (  )Polyuria  (  )Nausea	     (  )Hematochezia	(  )Cough			                    (  )Syncope   	(  )Hematuria  (  )Vomiting    (  )Chest Pain	(  )Wheezing			(  )Weakness  (x) hip pain  (  )Diarrhea     (  )Palpitations	(  )Anorexia			(  )Myalgia    Pertinent positives in HPI and daily subjective.  All other ROS negative.    Vital Signs Last 24 Hrs  T(C): 36.9 (20 Dec 2022 08:45), Max: 37.3 (20 Dec 2022 00:09)  T(F): 98.4 (20 Dec 2022 08:45), Max: 99.2 (20 Dec 2022 00:09)  HR: 81 (20 Dec 2022 08:45) (81 - 88)  BP: 136/80 (20 Dec 2022 08:45) (106/44 - 136/80)  BP(mean): --  RR: 17 (20 Dec 2022 08:45) (17 - 18)  SpO2: 97% (20 Dec 2022 08:45) (96% - 97%)    Parameters below as of 20 Dec 2022 08:45  Patient On (Oxygen Delivery Method): room air        PHYSICAL EXAM:    Constitutional: Appears Well    Neurological: A& O x 2 person and place    Skin: Warm    Respiratory and Thorax: normal effort; Breath sounds: normal; No rales/wheezing/rhonchi  	  Cardiovascular: S1, S2, regular, NMBR	    Gastrointestinal: BS + x 4Q, nontender	    Genitourinary:  Bladder nondistended, nontender    Musculoskeletal:   General Right:   no muscle/joint tenderness,   normal tone, no joint swelling,   ROM: full	    General Left:   no muscle/joint tenderness,   normal tone, no joint swelling,   ROM: limited    Hip:  left externally rotated and shortened.     Lower extrems:   Right: no calf tenderness              negative jane's sign               + pedal pulses    Left:   no calf tenderness              negative jane's sign               + pedal pulses                          9.0    9.07  )-----------( 178      ( 20 Dec 2022 04:35 )             27.3       12-20    142  |  108  |  15  ----------------------------<  129<H>  3.7   |  27  |  0.60    Ca    8.0<L>      20 Dec 2022 04:35    TPro  x   /  Alb  2.5<L>  /  TBili  x   /  DBili  x   /  AST  x   /  ALT  x   /  AlkPhos  x   12-20      PT/INR - ( 20 Dec 2022 04:35 )   PT: 23.4 sec;   INR: 2.00 ratio         PTT - ( 20 Dec 2022 04:35 )  PTT:32.6 sec				  < from: Xray Femur 2 Views, Left (12.18.22 @ 09:00) >  IMPRESSION:    Displaced left intertrochanteric hip fracture with angulated   foreshortening.    < end of copied text >    MEDICATIONS  (STANDING):  clonazePAM Oral Disintegrating Tablet 0.25 milliGRAM(s) Oral two times a day  escitalopram 10 milliGRAM(s) Oral daily  gabapentin 300 milliGRAM(s) Oral at bedtime  lactated ringers. 1000 milliLiter(s) IV Continuous <Continuous>  lactated ringers. 500 milliLiter(s) IV Continuous <Continuous>  lidocaine   4% Patch 1 Patch Transdermal daily  oxybutynin 10 milliGRAM(s) Oral daily  pantoprazole    Tablet 40 milliGRAM(s) Oral before breakfast          DVT Prophylaxis:  LMWH                   (  )  Heparin SQ           (  )  Coumadin             (  )  Xarelto                  (  )  Eliquis                   (  )  Venodynes           ( x )  Ambulation          (  )  UFH                       (  )  Contraindicated  ( x )  EC Aspirin             (  )

## 2022-12-20 NOTE — DISCHARGE NOTE PROVIDER - NSDCCPCAREPLAN_GEN_ALL_CORE_FT
PRINCIPAL DISCHARGE DIAGNOSIS  Diagnosis: Hip fracture, left  Assessment and Plan of Treatment:        PRINCIPAL DISCHARGE DIAGNOSIS  Diagnosis: Hip fracture, left  Assessment and Plan of Treatment: S/p LT hip IMN- WBAT with PT  Repeat HH on Monday 12/26  Outpatient ortho f/u      SECONDARY DISCHARGE DIAGNOSES  Diagnosis: Urinary retention  Assessment and Plan of Treatment: S/p Melissa  Outpatient voiding trial once more mobile    Diagnosis: DVT, lower extremity  Assessment and Plan of Treatment: LLE DVT  Cont heparin SQ and coumadin till INR therapeutic

## 2022-12-20 NOTE — CONSULT NOTE ADULT - CONSULT REASON
L intertrochanteric fx s/p fall
L Basicervical FN Fx
IVC filter
s/p fall risk stratification and anticoagulation management

## 2022-12-20 NOTE — PROGRESS NOTE ADULT - SUBJECTIVE AND OBJECTIVE BOX
Postop Check    Patient tolerated the procedure well. Patient seen and examined at bedside.  No acute complaints at this time. Pain well controlled. Denies chest pain, shortness of breath, nausea or vomiting.     PE:  Vital Signs Last 24 Hrs  T(C): 36.6 (12-20-22 @ 20:00), Max: 37.3 (12-20-22 @ 00:09)  T(F): 97.9 (12-20-22 @ 20:00), Max: 99.2 (12-20-22 @ 00:09)  HR: 84 (12-20-22 @ 20:00) (69 - 86)  BP: 127/62 (12-20-22 @ 20:00) (106/44 - 156/87)  BP(mean): --  RR: 17 (12-20-22 @ 20:00) (14 - 20)  SpO2: 99% (12-20-22 @ 20:00) (94% - 100%)    General: NAD, resting comfortably in bed  ******LE:   Dressing C/D/I  SCDs present bilaterally  Compartments soft and compressible  No calf tenderness bilaterally  +TA/EHL/FHL/GSC  TA ROM only to neutral as patient has tight achilles   SILT L3-S1  + DP/PT                            9.2    9.47  )-----------( 187      ( 20 Dec 2022 16:37 )             27.9     12-20    143  |  108  |  13  ----------------------------<  148<H>  3.8   |  25  |  0.63    Ca    7.9<L>      20 Dec 2022 16:37    TPro  x   /  Alb  2.5<L>  /  TBili  x   /  DBili  x   /  AST  x   /  ALT  x   /  AlkPhos  x   12-20    PT/INR - ( 20 Dec 2022 12:36 )   PT: 16.8 sec;   INR: 1.44 ratio         PTT - ( 20 Dec 2022 04:35 )  PTT:32.6 sec    A/P:  77y f s/p L IT Fx POD0    -PT/OT   -WBAT on the b/l LE  -Pain Control prn  -DVT ppx per primary / AC team  -Continue perioperative abx x 24 hours  -FU AM Labs  -Rest, ice, compress and elevate the extremity as we needed  -Incentive Spirometry  -Medical management appreciated  -Dispo pending PT eval    Postop Check    Patient tolerated the procedure well. Patient seen and examined at bedside.  No acute complaints at this time. Pain well controlled. Denies chest pain, shortness of breath, nausea or vomiting.     PE:  Vital Signs Last 24 Hrs  T(C): 36.6 (12-20-22 @ 20:00), Max: 37.3 (12-20-22 @ 00:09)  T(F): 97.9 (12-20-22 @ 20:00), Max: 99.2 (12-20-22 @ 00:09)  HR: 84 (12-20-22 @ 20:00) (69 - 86)  BP: 127/62 (12-20-22 @ 20:00) (106/44 - 156/87)  BP(mean): --  RR: 17 (12-20-22 @ 20:00) (14 - 20)  SpO2: 99% (12-20-22 @ 20:00) (94% - 100%)    General: NAD, resting comfortably in bed  LLE:   Dressing C/D/I  SCDs present bilaterally  Compartments soft and compressible  No calf tenderness bilaterally  +TA/EHL/FHL/GSC  TA ROM only to neutral as patient has tight achilles   SILT L3-S1  + DP/PT                            9.2    9.47  )-----------( 187      ( 20 Dec 2022 16:37 )             27.9     12-20    143  |  108  |  13  ----------------------------<  148<H>  3.8   |  25  |  0.63    Ca    7.9<L>      20 Dec 2022 16:37    TPro  x   /  Alb  2.5<L>  /  TBili  x   /  DBili  x   /  AST  x   /  ALT  x   /  AlkPhos  x   12-20    PT/INR - ( 20 Dec 2022 12:36 )   PT: 16.8 sec;   INR: 1.44 ratio         PTT - ( 20 Dec 2022 04:35 )  PTT:32.6 sec    A/P:  77y f s/p L IT Fx POD0    -PT/OT   -WBAT on the b/l LE  -Pain Control prn  -DVT ppx per primary / AC team  -Continue perioperative abx x 24 hours  -FU AM Labs  -Rest, ice, compress and elevate the extremity as we needed  -Incentive Spirometry  -Medical management appreciated  -Dispo pending PT eval   -No further acute orthopaedic surgical intervention indicated

## 2022-12-20 NOTE — DISCHARGE NOTE PROVIDER - NSDCCPTREATMENT_GEN_ALL_CORE_FT
PRINCIPAL PROCEDURE  Procedure: ORIF, hip, with intramedullary darwin  Findings and Treatment: left

## 2022-12-20 NOTE — DISCHARGE NOTE PROVIDER - HOSPITAL COURSE
Orthopedic Summary  PAST MEDICAL & SURGICAL HISTORY:  Anxiety      Chronic GERD      Osteoporosis      Hepatic cyst      Status post lumbar spine operation      H&P:  Pt is a 77y Female      Now s/p Hip IMN for fracture. Pt is afebrile with stable vital signs. Pain is controlled. Exam reveals intact EHL FHL TA GS, +DP. Dressing is clean and dry.    Hospital Course:  Patient presented to [facility] ED after a fall, found to have a hip fracture, and admitted to the Medical Service. Pt was  medically/cardiac cleared prior to surgery. Prophylactic antibiotics were started before the procedure and continued for 24 hours. They were admitted after surgery to the orthopedic floor.  There were no orthopedic complications during the hospital stay. All home medications were continued.    Routine consults were obtained from the Anticoagulation Team for DVT/PE prophylaxis and  Physical Therapy post-op. Patient was placed on  anticoagulation.  Pertinent home medications were continued.  Daily labs were followed.      On POD 0 there were no major issues. Pt received PT daily and will be Discharged per Medicine.  The orthopedic Attending is aware and agrees. See addendum to DC summary per medical team below for any additional info or changes. 77-year-old female with h/o  chronic back pain VCFs s/p Kyphoplasty, GERD, anxiety, osteoporosis, and hepatic cysts presents the ED from [facility] after unwitnessed fall resultant with LT hip fracture. Patient underwent LT hip IMN. Postop required 2U PRBC transfusion. Developed urinary retention requiring Melissa placement. Found to have LLE DVT- vascular seen and not a candidate for IVC filter, started bridging on heparin SQ and coumadin. Stable for discharge to [facility] today

## 2022-12-20 NOTE — DISCHARGE NOTE PROVIDER - CARE PROVIDER_API CALL
Teja Ponce)  Orthopaedic Surgery  [address], [place] [id number]  Phone: [phone]  Fax: [phone]  Follow Up Time:    DENZEL JIANG  [address] [place] [id number]  Phone: [phone]  Follow Up Time:

## 2022-12-20 NOTE — PROGRESS NOTE ADULT - SUBJECTIVE AND OBJECTIVE BOX
Patient seen and examined at bedside this AM.  No acute complaints at this time. Pain well controlled. Denies chest pain, shortness of breath, nausea or vomiting. Denies numbness/tingling b/l LEs.     PE:  Vital Signs Last 24 Hrs  T(C): 37.3 (20 Dec 2022 00:09), Max: 37.3 (20 Dec 2022 00:09)  T(F): 99.2 (20 Dec 2022 00:09), Max: 99.2 (20 Dec 2022 00:09)  HR: 81 (20 Dec 2022 00:09) (81 - 91)  BP: 106/44 (20 Dec 2022 00:09) (104/43 - 113/49)  BP(mean): --  RR: 18 (20 Dec 2022 00:09) (18 - 18)  SpO2: 96% (20 Dec 2022 00:09) (96% - 96%)    Parameters below as of 20 Dec 2022 00:09  Patient On (Oxygen Delivery Method): room air      General: NAD, resting comfortably in bed  LLE:   Compartments soft and compressible  No calf tenderness bilaterally  +TA/EHL/FHL/GSC  SILT L3-S1  + DP/PT                                             9.0    9.07  )-----------( 178      ( 20 Dec 2022 04:35 )             27.3       12-20    142  |  108  |  15  ----------------------------<  129<H>  3.7   |  27  |  0.60    Ca    8.0<L>      20 Dec 2022 04:35    TPro  x   /  Alb  2.5<L>  /  TBili  x   /  DBili  x   /  AST  x   /  ALT  x   /  AlkPhos  x   12-20            PT/INR - ( 20 Dec 2022 04:35 )   PT: 23.4 sec;   INR: 2.00 ratio         PTT - ( 20 Dec 2022 04:35 )  PTT:32.6 sec          A/P: 77y Female with L basicervical femoral neck hip fracture    Plan for IMN of the Left IT Fx today with Dr. Ponce  IVF while NPO  Please document risk stratification/clearance for OR   Hold chemical VTE ppx for OR  SCDs OK  INR 2.0  BLLE LE Dopp: Chr / persistent L Fem Vein, Pop, Gastroc V DVT  VSx c/s appreciated, no filter indicated  CXR/EKG obtained  FU Preop labs  Imaging reviewed  Hx of numerous vcfs, chronic, no back pain on exam   Pain control  NWB LLE, bedrest  Discussed with attending Dr. Ponce who agrees with the plan above

## 2022-12-20 NOTE — CONSULT NOTE ADULT - ASSESSMENT
This is a 77 year old female with pmh of Left LE dvt  on coumadin,   chronic back pain VCFs s/p Kyphoplasty, GERD, anxiety, osteoporosis, and hepatic cysts, presented to ED  for mechanical fall.  Pt foune to have a fracture of her left Displaced left intertrochanteric hip fracture.  Pt awaiting orthopedic surgical intervention.  Pt has high thrombotic risk and will need prophylactic anticoagulation post procedure.  will resume coumadin when stable.      plan:   Hold  all pharmacological anticoagulation until post procedure.   le venodynes  daily cbc/bmp, pt/inr.  bedrest  Thanks for consult will fu

## 2022-12-20 NOTE — DISCHARGE NOTE PROVIDER - NSDCMRMEDTOKEN_GEN_ALL_CORE_FT
Bengay Ultra Strength 4%-10%-30% topical cream: Apply topically to affected area 2 times a day  clonazePAM 0.25 mg oral tablet, disintegratin tab(s) orally 2 times a day  Cranberry oral capsule: 1 cap(s) orally once a day  escitalopram 10 mg oral tablet: 1 tab(s) orally once a day  gabapentin 100 mg oral capsule: 3 cap(s) orally once a day (at bedtime)  lidocaine 4% patch: Apply topically to affected area once a day  Metamucil 3.4 g/3.7 g oral powder for reconstitution: orally once a day (at bedtime)  oxybutynin 10 mg/24 hr oral tablet, extended release: 1 tab(s) orally once a day  pantoprazole 40 mg oral delayed release tablet: 1 tab(s) orally once a day  polyethylene glycol 3350 oral powder for reconstitution: 17 gram(s) orally once a day  Prolia 60 mg/mL subcutaneous solution: subcutaneous every 6 months  Tylenol 325 mg oral tablet: 1 tab(s) orally every 6 hours, As Needed  Vitamin B-12 1000 mcg oral tablet: 1 tab(s) orally once a day  Vitamin D3 1000 intl units (25 mcg) oral tablet: 1 tab(s) orally once a day  warfarin 5 mg oral tablet: 1 tab(s) orally once a day (at bedtime)   clonazePAM 0.25 mg oral tablet, disintegratin tab(s) orally 2 times a day  Coumadin 5 mg oral tablet: 1 tab(s) orally once a day (at bedtime) on  the repeat INR on  to decide on the coumadin dose  escitalopram 10 mg oral tablet: 1 tab(s) orally once a day  gabapentin 100 mg oral capsule: 3 cap(s) orally once a day (at bedtime)  heparin: 5000 unit(s) subcutaneous every 8 hours until INR is therapeutic  Metamucil 3.4 g/3.7 g oral powder for reconstitution: orally once a day (at bedtime)  Multiple Vitamins oral tablet: 1 tab(s) orally once a day  oxyCODONE 5 mg oral tablet: 1 tab(s) orally every 4 hours, As needed, severe pain  oxyCODONE 5 mg oral tablet: 0.5 tab(s) orally every 4 hours, As Needed for moderate pain  pantoprazole 40 mg oral delayed release tablet: 1 tab(s) orally once a day  polyethylene glycol 3350 oral powder for reconstitution: 17 gram(s) orally once a day (at bedtime)  senna leaf extract oral tablet: 2 tab(s) orally once a day (at bedtime)  Tylenol 325 mg oral tablet: 1 tab(s) orally every 4 hours, As Needed for mild pain  Vitamin B-12 1000 mcg oral tablet: 1 tab(s) orally once a day  Vitamin D3 1000 intl units (25 mcg) oral tablet: 1 tab(s) orally once a day  warfarin 7.5 mg oral tablet: 1 tab(s) orally once on 22   clonazePAM 0.25 mg oral tablet, disintegratin tab(s) orally 2 times a day  Coumadin 5 mg oral tablet: 1 tab(s) orally once a day. adjust for goal inr 2-3  escitalopram 10 mg oral tablet: 1 tab(s) orally once a day  gabapentin 100 mg oral capsule: 3 cap(s) orally once a day (at bedtime)  Metamucil 3.4 g/3.7 g oral powder for reconstitution: orally once a day (at bedtime)  Multiple Vitamins oral tablet: 1 tab(s) orally once a day  oxyCODONE 5 mg oral tablet: 1 tab(s) orally every 4 hours, As needed, severe pain  oxyCODONE 5 mg oral tablet: 0.5 tab(s) orally every 4 hours, As Needed for moderate pain  pantoprazole 40 mg oral delayed release tablet: 1 tab(s) orally once a day  polyethylene glycol 3350 oral powder for reconstitution: 17 gram(s) orally once a day (at bedtime)  senna leaf extract oral tablet: 2 tab(s) orally once a day (at bedtime)  Tylenol 325 mg oral tablet: 1 tab(s) orally every 4 hours, As Needed for mild pain  Vitamin B-12 1000 mcg oral tablet: 1 tab(s) orally once a day  Vitamin D3 1000 intl units (25 mcg) oral tablet: 1 tab(s) orally once a day

## 2022-12-21 LAB
ANION GAP SERPL CALC-SCNC: 8 MMOL/L — SIGNIFICANT CHANGE UP (ref 5–17)
BUN SERPL-MCNC: 16 MG/DL — SIGNIFICANT CHANGE UP (ref 7–23)
CALCIUM SERPL-MCNC: 7.8 MG/DL — LOW (ref 8.5–10.1)
CHLORIDE SERPL-SCNC: 113 MMOL/L — HIGH (ref 96–108)
CO2 SERPL-SCNC: 24 MMOL/L — SIGNIFICANT CHANGE UP (ref 22–31)
CREAT SERPL-MCNC: 0.72 MG/DL — SIGNIFICANT CHANGE UP (ref 0.5–1.3)
EGFR: 87 ML/MIN/1.73M2 — SIGNIFICANT CHANGE UP
GLUCOSE SERPL-MCNC: 142 MG/DL — HIGH (ref 70–99)
HCT VFR BLD CALC: 22.7 % — LOW (ref 34.5–45)
HCT VFR BLD CALC: 25 % — LOW (ref 34.5–45)
HGB BLD-MCNC: 7.5 G/DL — LOW (ref 11.5–15.5)
HGB BLD-MCNC: 8 G/DL — LOW (ref 11.5–15.5)
INR BLD: 1.18 RATIO — HIGH (ref 0.88–1.16)
MCHC RBC-ENTMCNC: 30.5 PG — SIGNIFICANT CHANGE UP (ref 27–34)
MCHC RBC-ENTMCNC: 31.5 PG — SIGNIFICANT CHANGE UP (ref 27–34)
MCHC RBC-ENTMCNC: 32 GM/DL — SIGNIFICANT CHANGE UP (ref 32–36)
MCHC RBC-ENTMCNC: 33 GM/DL — SIGNIFICANT CHANGE UP (ref 32–36)
MCV RBC AUTO: 95.4 FL — SIGNIFICANT CHANGE UP (ref 80–100)
MCV RBC AUTO: 95.4 FL — SIGNIFICANT CHANGE UP (ref 80–100)
PLATELET # BLD AUTO: 174 K/UL — SIGNIFICANT CHANGE UP (ref 150–400)
PLATELET # BLD AUTO: 191 K/UL — SIGNIFICANT CHANGE UP (ref 150–400)
POTASSIUM SERPL-MCNC: 3.7 MMOL/L — SIGNIFICANT CHANGE UP (ref 3.5–5.3)
POTASSIUM SERPL-SCNC: 3.7 MMOL/L — SIGNIFICANT CHANGE UP (ref 3.5–5.3)
PROTHROM AB SERPL-ACNC: 13.7 SEC — HIGH (ref 10.5–13.4)
RBC # BLD: 2.38 M/UL — LOW (ref 3.8–5.2)
RBC # BLD: 2.62 M/UL — LOW (ref 3.8–5.2)
RBC # FLD: 13 % — SIGNIFICANT CHANGE UP (ref 10.3–14.5)
RBC # FLD: 13.1 % — SIGNIFICANT CHANGE UP (ref 10.3–14.5)
SODIUM SERPL-SCNC: 145 MMOL/L — SIGNIFICANT CHANGE UP (ref 135–145)
WBC # BLD: 10.36 K/UL — SIGNIFICANT CHANGE UP (ref 3.8–10.5)
WBC # BLD: 10.63 K/UL — HIGH (ref 3.8–10.5)
WBC # FLD AUTO: 10.36 K/UL — SIGNIFICANT CHANGE UP (ref 3.8–10.5)
WBC # FLD AUTO: 10.63 K/UL — HIGH (ref 3.8–10.5)

## 2022-12-21 PROCEDURE — 99233 SBSQ HOSP IP/OBS HIGH 50: CPT

## 2022-12-21 PROCEDURE — 99231 SBSQ HOSP IP/OBS SF/LOW 25: CPT

## 2022-12-21 RX ORDER — WARFARIN SODIUM 2.5 MG/1
5 TABLET ORAL DAILY
Refills: 0 | Status: DISCONTINUED | OUTPATIENT
Start: 2022-12-21 | End: 2022-12-23

## 2022-12-21 RX ADMIN — LIDOCAINE 1 PATCH: 4 CREAM TOPICAL at 09:28

## 2022-12-21 RX ADMIN — TRAMADOL HYDROCHLORIDE 50 MILLIGRAM(S): 50 TABLET ORAL at 16:48

## 2022-12-21 RX ADMIN — HEPARIN SODIUM 5000 UNIT(S): 5000 INJECTION INTRAVENOUS; SUBCUTANEOUS at 21:38

## 2022-12-21 RX ADMIN — OXYCODONE HYDROCHLORIDE 5 MILLIGRAM(S): 5 TABLET ORAL at 09:27

## 2022-12-21 RX ADMIN — SODIUM CHLORIDE 125 MILLILITER(S): 9 INJECTION INTRAMUSCULAR; INTRAVENOUS; SUBCUTANEOUS at 09:27

## 2022-12-21 RX ADMIN — HEPARIN SODIUM 5000 UNIT(S): 5000 INJECTION INTRAVENOUS; SUBCUTANEOUS at 05:48

## 2022-12-21 RX ADMIN — GABAPENTIN 300 MILLIGRAM(S): 400 CAPSULE ORAL at 21:38

## 2022-12-21 RX ADMIN — LIDOCAINE 1 PATCH: 4 CREAM TOPICAL at 19:35

## 2022-12-21 RX ADMIN — ESCITALOPRAM OXALATE 10 MILLIGRAM(S): 10 TABLET, FILM COATED ORAL at 09:28

## 2022-12-21 RX ADMIN — Medication 975 MILLIGRAM(S): at 06:15

## 2022-12-21 RX ADMIN — WARFARIN SODIUM 5 MILLIGRAM(S): 2.5 TABLET ORAL at 21:38

## 2022-12-21 RX ADMIN — PANTOPRAZOLE SODIUM 40 MILLIGRAM(S): 20 TABLET, DELAYED RELEASE ORAL at 05:48

## 2022-12-21 RX ADMIN — Medication 975 MILLIGRAM(S): at 21:38

## 2022-12-21 RX ADMIN — Medication 975 MILLIGRAM(S): at 05:48

## 2022-12-21 RX ADMIN — HEPARIN SODIUM 5000 UNIT(S): 5000 INJECTION INTRAVENOUS; SUBCUTANEOUS at 14:22

## 2022-12-21 RX ADMIN — TRAMADOL HYDROCHLORIDE 50 MILLIGRAM(S): 50 TABLET ORAL at 17:18

## 2022-12-21 RX ADMIN — Medication 0.25 MILLIGRAM(S): at 21:38

## 2022-12-21 RX ADMIN — Medication 0.25 MILLIGRAM(S): at 09:27

## 2022-12-21 RX ADMIN — Medication 975 MILLIGRAM(S): at 14:54

## 2022-12-21 RX ADMIN — LIDOCAINE 1 PATCH: 4 CREAM TOPICAL at 21:28

## 2022-12-21 RX ADMIN — SENNA PLUS 2 TABLET(S): 8.6 TABLET ORAL at 21:38

## 2022-12-21 RX ADMIN — Medication 975 MILLIGRAM(S): at 14:24

## 2022-12-21 RX ADMIN — OXYCODONE HYDROCHLORIDE 5 MILLIGRAM(S): 5 TABLET ORAL at 09:57

## 2022-12-21 RX ADMIN — Medication 1 TABLET(S): at 09:27

## 2022-12-21 RX ADMIN — Medication 975 MILLIGRAM(S): at 21:39

## 2022-12-21 RX ADMIN — POLYETHYLENE GLYCOL 3350 17 GRAM(S): 17 POWDER, FOR SOLUTION ORAL at 21:37

## 2022-12-21 RX ADMIN — Medication 3 MILLIGRAM(S): at 21:38

## 2022-12-21 NOTE — OCCUPATIONAL THERAPY INITIAL EVALUATION ADULT - TRANSFER TRAINING, PT EVAL
Patient will perform toilet/commode transfer with CGA in 2 weeks using necessary DME. Patient will perform toilet/commode transfer with maximal assistance x 1 in 2 weeks using necessary DME.

## 2022-12-21 NOTE — PHYSICAL THERAPY INITIAL EVALUATION ADULT - MODALITIES TREATMENT COMMENTS
The pt demonstrated good overall activity tolerance, responding well to therex review, transfer and ambulation tx. The pt was returned to supine and adjusted for comfort in bed, bed alarm secured, RN aware. CB, tray and phone in place. The pt was in NAD at end of tx. The pt was limited today in ambulation tolerance due to near syncopal episode, low trending BP, monitored by nursing assistant and reported to RN, the pt was also grossly incontinent of urine while in standing position. The pt was returned to supine from bedside chair due to c/o fatigue and low BP. The pt was in NAD at end of tx, supine.

## 2022-12-21 NOTE — PHYSICAL THERAPY INITIAL EVALUATION ADULT - GENERAL OBSERVATIONS, REHAB EVAL
The pt was pleasant and cooperative, received on 2N, supine and willing to participate in PT evaluation. 1 IV, and bilateral SCDs in place.

## 2022-12-21 NOTE — PROGRESS NOTE ADULT - SUBJECTIVE AND OBJECTIVE BOX
Patient seen and examined at bedside.  No acute complaints at this time. Pain well controlled. Denies chest pain, shortness of breath, nausea or vomiting.       LABS:                        9.2    9.47  )-----------( 187      ( 20 Dec 2022 16:37 )             27.9     12-20    143  |  108  |  13  ----------------------------<  148<H>  3.8   |  25  |  0.63    Ca    7.9<L>      20 Dec 2022 16:37    TPro  x   /  Alb  2.5<L>  /  TBili  x   /  DBili  x   /  AST  x   /  ALT  x   /  AlkPhos  x   12-20    PT/INR - ( 20 Dec 2022 12:36 )   PT: 16.8 sec;   INR: 1.44 ratio         PTT - ( 20 Dec 2022 04:35 )  PTT:32.6 sec      VITAL SIGNS:  T(C): 36.6 (12-21-22 @ 00:30), Max: 36.9 (12-20-22 @ 08:45)  HR: 77 (12-21-22 @ 00:30) (69 - 86)  BP: 105/54 (12-21-22 @ 00:30) (105/54 - 156/87)  RR: 17 (12-21-22 @ 00:30) (14 - 20)  SpO2: 93% (12-21-22 @ 00:30) (93% - 100%)    PE:      General: NAD, resting comfortably in bed  LLE:   Dressing C/D/I  SCDs present bilaterally  Compartments soft and compressible  No calf tenderness bilaterally  +TA/EHL/FHL/GSC  TA ROM only to neutral as patient has tight achilles   SILT L3-S1  + DP/PT      A/P:  77y f s/p L IT Fx POD1    -PT/OT   -WBAT on the b/l LE  -Pain Control prn  -DVT ppx per primary / AC team  -Continue perioperative abx x 24 hours  -FU AM Labs  -Rest, ice, compress and elevate the extremity as we needed  -Incentive Spirometry  -Medical management appreciated  -Dispo pending PT eval   -No further acute orthopaedic surgical intervention indicated

## 2022-12-21 NOTE — OCCUPATIONAL THERAPY INITIAL EVALUATION ADULT - MD ORDER
"OT Evaluate and Treat"- MD orders received. Chart reviewed, contents noted, conferred with JORGE Wade (  ). "OT Evaluate and Treat"- MD orders received. Chart reviewed, contents noted, conferred with JORGE Perry (116/49, 94%).

## 2022-12-21 NOTE — OCCUPATIONAL THERAPY INITIAL EVALUATION ADULT - ADL RETRAINING, OT EVAL
Patient will perform toileting with moderate assistance in 2 weeks. Patient will complete LBD using AE prn with moderate assistance in 2 weeks. Patient will perform toileting with maximal assistance in 2 weeks. Patient will complete LBD using AE prn with maximal assistance in 2 weeks.

## 2022-12-21 NOTE — OCCUPATIONAL THERAPY INITIAL EVALUATION ADULT - PERTINENT HX OF CURRENT PROBLEM, REHAB EVAL
Patient is a 78 y/o female w/ PMH of chronic back pain VCF's s/p kyphoplasty,  LLE DVT on coumadin, GERD, anxiety, osteoporosis, and hepatic cysts [facility]  As per triage note patient was reaching for heater while sitting on chair fell over onto left side. Trauma alert was called upon arrival, does endorse multiple falls in the past year.  In ED, noted to have left intertrochanteric left hip fracture and was admitted. Per H&P patient states she developed LLE DVT after spine surgery. Per Vascular consult note: there is no indication for IVC filter placement for LLE DVT since it is chronic. Patient is a now s/p L IT fx on 12/20.

## 2022-12-21 NOTE — PHYSICAL THERAPY INITIAL EVALUATION ADULT - PERTINENT HX OF CURRENT PROBLEM, REHAB EVAL
77F, pmh of chronic back pain VCFs s/p Kyphoplasty,  LLE DVT on coumadin,, GERD, anxiety, osteoporosis, and hepatic cysts presents the ED from [facility] after unwitnessed fall.  As per triage note patient was reaching for heater while sitting on chair fell over onto left side.  In ED, stated she did not pass out. She was unsure why she fell but states she was not able to get up off the floor.  States she has pain in her left hip left side of back.  Denies chest pain shortness of breath nausea vomiting headache neck pain.  Trauma alert was called upon arrival.  does endorse multiple falls in the past year - In ED, noted to have left intertrochanteric hip fracture and admitted.

## 2022-12-21 NOTE — PROGRESS NOTE ADULT - SUBJECTIVE AND OBJECTIVE BOX
c/c: Fall/left hip pain    HPI:  77F, pmh of chronic back pain VCFs s/p Kyphoplasty,  LLE DVT on coumadin,, GERD, anxiety, osteoporosis, and hepatic cysts presents the ED from [facility] after unwitnessed fall.  As per triage note patient was reaching for heater while sitting on chair fell over onto left side.  In ED, stated she did not pass out. She was unsure why she fell but states she was not able to get up off the floor.  States she has pain in her left hip left side of back.  Denies chest pain shortness of breath nausea vomiting headache neck pain.  Trauma alert was called upon arrival.  does endorse multiple falls in the past year.    In ED, noted to have left intertrochanteric hip fracture and admitted.   states she developed LLE DVT after spine surgery. Initially was on xarelto. Per review of records, was readmitted a month later and found to have DVT LLE but it was unclear if this was worse/same or better than initial DVT a month earlier. She was bridged to coumadin and discharged.   She denies h/o stroke/mi. No sob/chest pain. No f/c/r. no cough/sputum. no dysuria.     Now s/p ORIF 12/20    pt seen and examined this am. Was having some pain to left hip. no sob/chest pain. tolerating po. no difficulty voiding. NO bm yet.       Review of system- All 10 systems reviewed and is as per HPI otherwise negative.     Vital Signs Last 24 Hrs  T(C): 36.9 (21 Dec 2022 08:55), Max: 36.9 (21 Dec 2022 08:55)  T(F): 98.4 (21 Dec 2022 08:55), Max: 98.4 (21 Dec 2022 08:55)  HR: 77 (21 Dec 2022 11:30) (69 - 101)  BP: 96/45 (21 Dec 2022 11:30) (91/43 - 156/87)  RR: 17 (21 Dec 2022 08:55) (14 - 20)  SpO2: 94% (21 Dec 2022 08:55) (93% - 100%)    Parameters below as of 21 Dec 2022 08:55  Patient On (Oxygen Delivery Method): room air        PHYSICAL EXAM:    GENERAL: Comfortable, no acute distress  HEAD:  Atraumatic, Normocephalic  EYES: EOMI, PERRLA  HEENT: Moist mucous membranes  NECK: Supple, No JVD  NERVOUS SYSTEM:  Alert & Oriented X3, Motor Strength 5/5 B/L upper and lower extremities  CHEST/LUNG: Clear to auscultation bilaterally  HEART: regular rate and rhythm.   ABDOMEN: Soft, Nontender, Nondistended; Bowel sounds present  GENITOURINARY- Voiding, no palpable bladder  EXTREMITIES:  No clubbing, cyanosis, edema  MUSCULOSKELETAL- LLE dressing intact.   SKIN-no rash    LABS:                        8.0    10.36 )-----------( 191      ( 21 Dec 2022 08:35 )             25.0     12-21    145  |  113<H>  |  16  ----------------------------<  142<H>  3.7   |  24  |  0.72    Ca    7.8<L>      21 Dec 2022 08:35    TPro  x   /  Alb  2.5<L>  /  TBili  x   /  DBili  x   /  AST  x   /  ALT  x   /  AlkPhos  x   12-20    PT/INR - ( 21 Dec 2022 08:35 )   PT: 13.7 sec;   INR: 1.18 ratio         PTT - ( 20 Dec 2022 04:35 )  PTT:32.6 sec          US Duplex Venous Lower Ext Complete, Bilateral (12.18.22 @ 13:10) >  IMPRESSION:  Persistent DEEP venous thrombosis involving the LEFT femoral, popliteal,   and gastrocnemius veins. This was identified previously on January 29, 2020.      MEDICATIONS  (STANDING):  acetaminophen     Tablet .. 975 milliGRAM(s) Oral every 8 hours  acetaminophen   IVPB .. 1000 milliGRAM(s) IV Intermittent once  benzocaine 15 mG/menthol 3.6 mG Lozenge 1 Lozenge Oral at bedtime  clonazePAM Oral Disintegrating Tablet 0.25 milliGRAM(s) Oral two times a day  escitalopram 10 milliGRAM(s) Oral daily  gabapentin 300 milliGRAM(s) Oral at bedtime  heparin   Injectable 5000 Unit(s) SubCutaneous every 8 hours  lactated ringers. 500 milliLiter(s) (500 mL/Hr) IV Continuous <Continuous>  lidocaine   4% Patch 1 Patch Transdermal daily  melatonin 3 milliGRAM(s) Oral at bedtime  multivitamin 1 Tablet(s) Oral daily  pantoprazole    Tablet 40 milliGRAM(s) Oral before breakfast  polyethylene glycol 3350 17 Gram(s) Oral at bedtime  senna 2 Tablet(s) Oral at bedtime  sodium chloride 0.9%. 1000 milliLiter(s) (50 mL/Hr) IV Continuous <Continuous>  sodium chloride 0.9%. 1000 milliLiter(s) (125 mL/Hr) IV Continuous <Continuous>  warfarin 5 milliGRAM(s) Oral daily    MEDICATIONS  (PRN):  acetaminophen     Tablet .. 650 milliGRAM(s) Oral every 6 hours PRN Temp greater or equal to 38C (100.4F), Mild Pain (1 - 3)  magnesium hydroxide Suspension 30 milliLiter(s) Oral daily PRN Constipation  ondansetron Injectable 4 milliGRAM(s) IV Push every 6 hours PRN Nausea and/or Vomiting  oxyCODONE    IR 5 milliGRAM(s) Oral every 4 hours PRN Moderate Pain (4 - 6)  oxyCODONE    IR 10 milliGRAM(s) Oral every 4 hours PRN Severe Pain (7 - 10)  traMADol 50 milliGRAM(s) Oral every 6 hours PRN Mild Pain (1 - 3)    ASSESSMENT AND PLAN:  77f, PMH as above a/w    1. Left hip fracture:  -s/p ORIF POD#1  -pain control.  -physical therapy post opo  -incentive spirometry  -bowel regimen.     2. Vasovagal episode:  -Increased ivf this am.   -repeat CBC at noon.     3. Acute blood loss anemia:  -likely related to hematoma at fracture site + surgery  -repeat CBC at noon.    3. Persistent LLE dvt:  -seen by vascular who felt pt did not have any indication for IVC filter.   -coumadin resumed.     4. Anxiety/depression:  -continue klonipin/escitalopram.     5. OAB:  -hold oxybutinin for now.     6. DVT px:  -hep sq/coumadin.

## 2022-12-21 NOTE — OCCUPATIONAL THERAPY INITIAL EVALUATION ADULT - ADDITIONAL COMMENTS
Patient resides at [facility], reports being able to perform ADLs (I) with exception of showering, and IADL[facility]. Patient reports using a RW to ambulate. Patient is an unreliable historian at times. Patient reports utilizing a commode and shower. RHD, glasses.

## 2022-12-21 NOTE — OCCUPATIONAL THERAPY INITIAL EVALUATION ADULT - PRECAUTIONS/LIMITATIONS, REHAB EVAL
3L SPO2 if below 94%, elevate HOB, DNR/DNI, notify if systolic >140 <90, HR >100 <50/oxygen therapy device and L/min

## 2022-12-21 NOTE — PROGRESS NOTE ADULT - SUBJECTIVE AND OBJECTIVE BOX
HPI:  77-year-old female with h/o  chronic back pain VCFs s/p Kyphoplasty, GERD, anxiety, osteoporosis, and hepatic cysts, LEFT LE DVT on coumadin,  presents the ED from [facility] after unwitnessed fall.  As per triage note patient was reaching for heater while sitting on chair fell over onto left side.  Here patient states that she did not syncopized she is unsure why she fell but states she was not able to get up off the floor.  States she has pain in her left hip left side of back.  Denies chest pain shortness of breath nausea vomiting headache neck pain.  Trauma alert was called upon arrival.  does endorses multiple falls in the past year.      In ED, noted to have left intertrochanteric hip fracture.  [id number] (18 Dec 2022 13:12)      Patient is a 77y old  Female who presents with a chief complaint of fall (20 Dec 2022 08:25)      Consulted by Dr. Teja Ponce  for VTE prophylaxis, risk stratification, and anticoagulation management.    PAST MEDICAL & SURGICAL HISTORY:  Anxiety  left le DVT on coumadin    Chronic GERD      Osteoporosis      Hepatic cyst      Status post lumbar spine operation          FAMILY HISTORY:  FH: diabetes mellitus    FHx: leukemia  mother, age 50s        Interval Note:  22: Pt seen at bedside [id number]. Pt knows her name and that her hip hurts.  Do not remember falling.  Pt made aware she will need prophylactic Anticoagulation post procedure.  Will need more reinforcement.  Pt states she takes coumadin.  Pt made aware we will restart her coumadin when stable post procedure.   2022 Pt sen at bedside on 2north.  Discussed her resuming her coumadin overlapping with heparin.  Pt s/p left hip IMN  on 2022.        CAPRINI SCORE  AGE RELATED RISK FACTORS                                                       MOBILITY RELATED FACTORS  [ ] Age 41-60 years                                            (1 Point)                  [ ] Bed rest                                                        (1 Point)  [ ] Age: 61-74 years                                           (2 Points)                [ ] Plaster cast                                                   (2 Points)  [x ] Age= 75 years                                              (3 Points)                 [ ] Bed bound for more than 72 hours                   (2 Points)    DISEASE RELATED RISK FACTORS                                               GENDER SPECIFIC FACTORS  [ ] Edema in the lower extremities                       (1 Point)           [ ] Pregnancy                                                            (1 Point)  [ ] Varicose veins                                               (1 Point)                  [ ] Post-partum < 6 weeks                                      (1 Point)             [ ] BMI > 25 Kg/m2                                            (1 Point)                  [ ] Hormonal therapy or oral contraception       (1 Point)                 [ ] Sepsis (in the previous month)                        (1 Point)             [ ] History of pregnancy complications                (1Point)  [ ] Pneumonia or serious lung disease                                             [ ] Unexplained or recurrent  (=/>3), premature                                 (In the previous month)                               (1 Point)                birth with toxemia or growth-restricted infant (1 Point)  [ ] Abnormal pulmonary function test            (1 Point)                                   SURGERY RELATED RISK FACTORS  [ ] Acute myocardial infarction                       (1 Point)                  [ ]  Section                                         (1 Point)  [ ] Congestive heart failure (in the previous month) (1 Point)   [ ] Minor surgery   lasting <45 minutes       (1 Point)   [ ] Inflammatory bowel disease                             (1 Point)          [ ] Arthroscopic surgery                                  (2 Points)  [ ] Central venous access                                    (2 Points)            [ ] General surgery lasting >45 minutes      (2 Points)       [ ] Stroke (in the previous month)                  (5 Points)            [ ] Elective major lower extremity arthroplasty (5 Points)                                   [  ] Malignancy (present or past include skin melanoma                                          but exclude  basal skin cell)    (2 points)                                      TRAUMA RELATED RISK FACTORS                HEMATOLOGY RELATED FACTORS                                  [x ] Fracture of the hip, pelvis, or leg                       (5 Points)  [x ] Prior episodes of VTE                                     (3 Points)          [ ] Acute spinal cord injury (in the previous month)  (5 Points)  [ ] Positive family history for VTE                         (3 Points)       [ ] Paralysis (less than 1 month)                          (5 Points)  [ ] Prothrombin 24643 A                                      (3 Points)         [ ] Multiple Trauma (within 1month)                 (5Points)                                                                                                                                                                [ ] Factor V Leiden                                          (3 Points)                                OTHER RISK FACTORS                          [ ] Lupus anticoagulants                                     (3 Points)                       [ ] BMI > 40                          (1 Point)                                                         [ ] Anticardiolipin antibodies                                (3 Points)                   [ ] Smoking                              (1Point)                                                [ ] High homocysteine in the blood                      (3 Points)                [  ] Diabetes requiring insulin (1point)                         [ ] Other congenital or acquired thrombophilia       (3 Points)          [  ] Chemotherapy                   (1 Point)  [ ] Heparin induced thrombocytopenia                  (3 Points)             [  ] Blood Transfusion                (1 point)                                                                                                             Total Score [ 11 ]                                                                                                                                                                                                                                                                                                                                                                                                                                         IMPROVE Bleeding Risk Score: 1.5    Falls Risk:   High (x  )  Mod (  )  Low (  )  crcl: 72        cr: .60         BMI: 21.1            EBL:  50   ml        Denies any personal or familial history of clotting or bleeding disorders.    Allergies    No Known Drug Allergies  shellfish (Unknown)    Intolerances        REVIEW OF SYSTEMS    (  )Fever	     (  )Constipation	(  )SOB				(  )Headache	(  )Dysuria  (  )Chills	     (  )Melena	(  )Dyspnea present on exertion	                    (  )Dizziness                    (  )Polyuria  (  )Nausea	     (  )Hematochezia	(  )Cough			                    (  )Syncope   	(  )Hematuria  (  )Vomiting    (  )Chest Pain	(  )Wheezing			(  )Weakness  (x) hip pain  (  )Diarrhea     (  )Palpitations	(  )Anorexia			(  )Myalgia    Pertinent positives in HPI and daily subjective.  All other ROS negative.    Vital Signs Last 24 Hrs  T(C): 36.9 (22 @ 08:55), Max: 36.9 (22 @ 08:55)  T(F): 98.4 (22 @ 08:55), Max: 98.4 (22 @ 08:55)  HR: 77 (22 @ 11:30) (69 - 101)  BP: 96/45 (22 @ 11:30) (91/43 - 156/87)  BP(mean): --  RR: 17 (22 @ 08:55) (14 - 20)  SpO2: 94% (22 @ 08:55) (93% - 100%)    Parameters below as of 20 Dec 2022 08:45  Patient On (Oxygen Delivery Method): room air        PHYSICAL EXAM:    Constitutional: Appears Well    Neurological: A& O x 2 person and place    Skin: Warm    Respiratory and Thorax: normal effort; Breath sounds: normal; No rales/wheezing/rhonchi  	  Cardiovascular: S1, S2, regular, NMBR	    Gastrointestinal: BS + x 4Q, nontender	    Genitourinary:  Bladder nondistended, nontender    Musculoskeletal:   General Right:   no muscle/joint tenderness,   normal tone, no joint swelling,   ROM: full	    General Left:   no muscle/joint tenderness,   normal tone, no joint swelling,   ROM: limited    Hip:  left  dsg CDI     Lower extrems:   Right: no calf tenderness              negative jane's sign               + pedal pulses    Left:   no calf tenderness              negative jane's sign               + pedal pulses                                   8.0    10.36 )-----------( 191      ( 21 Dec 2022 08:35 )             25.0       12-21    145  |  113<H>  |  16  ----------------------------<  142<H>  3.7   |  24  |  0.72    Ca    7.8<L>      21 Dec 2022 08:35    TPro  x   /  Alb  2.5<L>  /  TBili  x   /  DBili  x   /  AST  x   /  ALT  x   /  AlkPhos  x   1220       PTT - ( 20 Dec 2022 04:35 )  PTT:32.6 sec             9.0    9.07  )-----------( 178      ( 20 Dec 2022 04:35 )             27.3       12    142  |  108  |  15  ----------------------------<  129<H>  3.7   |  27  |  0.60    Ca    8.0<L>      20 Dec 2022 04:35    TPro  x   /  Alb  2.5<L>  /  TBili  x   /  DBili  x   /  AST  x   /  ALT  x   /  AlkPhos  x   1220    PT/INR - ( 21 Dec 2022 08:35 )   PT: 13.7 sec;   INR: 1.18 ratio    PT/INR - ( 20 Dec 2022 04:35 )   PT: 23.4 sec;   INR: 2.00 ratio VIT K 2.5MG         PTT - ( 20 Dec 2022 04:35 )  PTT:32.6 sec				  < from: Xray Femur 2 Views, Left (22 @ 09:00) >  IMPRESSION:    Displaced left intertrochanteric hip fracture with angulated   foreshortening.    < end of copied text >    MEDICATIONS  (STANDING):  acetaminophen     Tablet .. 975 milliGRAM(s) Oral every 8 hours  acetaminophen   IVPB .. 1000 milliGRAM(s) IV Intermittent once  benzocaine 15 mG/menthol 3.6 mG Lozenge 1 Lozenge Oral at bedtime  clonazePAM Oral Disintegrating Tablet 0.25 milliGRAM(s) Oral two times a day  escitalopram 10 milliGRAM(s) Oral daily  gabapentin 300 milliGRAM(s) Oral at bedtime  heparin   Injectable 5000 Unit(s) SubCutaneous every 8 hours  lactated ringers. 500 milliLiter(s) IV Continuous <Continuous>  lidocaine   4% Patch 1 Patch Transdermal daily  melatonin 3 milliGRAM(s) Oral at bedtime  multivitamin 1 Tablet(s) Oral daily  pantoprazole    Tablet 40 milliGRAM(s) Oral before breakfast  polyethylene glycol 3350 17 Gram(s) Oral at bedtime  senna 2 Tablet(s) Oral at bedtime  sodium chloride 0.9%. 1000 milliLiter(s) IV Continuous <Continuous>  sodium chloride 0.9%. 1000 milliLiter(s) IV Continuous <Continuous>  warfarin 5 milliGRAM(s) Oral daily            DVT Prophylaxis:  LMWH                   (  )  Heparin SQ           (X  )  Coumadin             ( X )  Xarelto                  (  )  Eliquis                   (  )  Venodynes           ( x )  Ambulation          (X  )  UFH                       (  )  Contraindicated  (  )  EC Aspirin             (  )

## 2022-12-21 NOTE — OCCUPATIONAL THERAPY INITIAL EVALUATION ADULT - GENERAL OBSERVATIONS, REHAB EVAL
Patient rec'd Patient rec'd/left semi-supine in bed, Patient rec'd/left semi-supine in bed, +IV infusing, lines intact, needs met, agreeable to OT IE.

## 2022-12-22 LAB
ANION GAP SERPL CALC-SCNC: 5 MMOL/L — SIGNIFICANT CHANGE UP (ref 5–17)
BASOPHILS # BLD AUTO: 0.02 K/UL — SIGNIFICANT CHANGE UP (ref 0–0.2)
BASOPHILS NFR BLD AUTO: 0.2 % — SIGNIFICANT CHANGE UP (ref 0–2)
BUN SERPL-MCNC: 21 MG/DL — SIGNIFICANT CHANGE UP (ref 7–23)
CALCIUM SERPL-MCNC: 7.4 MG/DL — LOW (ref 8.5–10.1)
CHLORIDE SERPL-SCNC: 117 MMOL/L — HIGH (ref 96–108)
CO2 SERPL-SCNC: 23 MMOL/L — SIGNIFICANT CHANGE UP (ref 22–31)
CREAT SERPL-MCNC: 1.3 MG/DL — SIGNIFICANT CHANGE UP (ref 0.5–1.3)
EGFR: 43 ML/MIN/1.73M2 — LOW
EOSINOPHIL # BLD AUTO: 0.05 K/UL — SIGNIFICANT CHANGE UP (ref 0–0.5)
EOSINOPHIL NFR BLD AUTO: 0.5 % — SIGNIFICANT CHANGE UP (ref 0–6)
GLUCOSE SERPL-MCNC: 114 MG/DL — HIGH (ref 70–99)
HCT VFR BLD CALC: 26.9 % — LOW (ref 34.5–45)
HGB BLD-MCNC: 8.8 G/DL — LOW (ref 11.5–15.5)
IMM GRANULOCYTES NFR BLD AUTO: 0.7 % — SIGNIFICANT CHANGE UP (ref 0–0.9)
INR BLD: 1.18 RATIO — HIGH (ref 0.88–1.16)
INR BLD: 1.26 RATIO — HIGH (ref 0.88–1.16)
LYMPHOCYTES # BLD AUTO: 0.77 K/UL — LOW (ref 1–3.3)
LYMPHOCYTES # BLD AUTO: 7.2 % — LOW (ref 13–44)
MAGNESIUM SERPL-MCNC: 2.2 MG/DL — SIGNIFICANT CHANGE UP (ref 1.6–2.6)
MCHC RBC-ENTMCNC: 31.4 PG — SIGNIFICANT CHANGE UP (ref 27–34)
MCHC RBC-ENTMCNC: 32.7 GM/DL — SIGNIFICANT CHANGE UP (ref 32–36)
MCV RBC AUTO: 96.1 FL — SIGNIFICANT CHANGE UP (ref 80–100)
MONOCYTES # BLD AUTO: 0.83 K/UL — SIGNIFICANT CHANGE UP (ref 0–0.9)
MONOCYTES NFR BLD AUTO: 7.7 % — SIGNIFICANT CHANGE UP (ref 2–14)
NEUTROPHILS # BLD AUTO: 9 K/UL — HIGH (ref 1.8–7.4)
NEUTROPHILS NFR BLD AUTO: 83.7 % — HIGH (ref 43–77)
PHOSPHATE SERPL-MCNC: 2.4 MG/DL — LOW (ref 2.5–4.5)
PLATELET # BLD AUTO: 179 K/UL — SIGNIFICANT CHANGE UP (ref 150–400)
POTASSIUM SERPL-MCNC: 3.7 MMOL/L — SIGNIFICANT CHANGE UP (ref 3.5–5.3)
POTASSIUM SERPL-SCNC: 3.7 MMOL/L — SIGNIFICANT CHANGE UP (ref 3.5–5.3)
PROTHROM AB SERPL-ACNC: 13.7 SEC — HIGH (ref 10.5–13.4)
PROTHROM AB SERPL-ACNC: 14.6 SEC — HIGH (ref 10.5–13.4)
RBC # BLD: 2.8 M/UL — LOW (ref 3.8–5.2)
RBC # FLD: 13.6 % — SIGNIFICANT CHANGE UP (ref 10.3–14.5)
SARS-COV-2 RNA SPEC QL NAA+PROBE: SIGNIFICANT CHANGE UP
SODIUM SERPL-SCNC: 145 MMOL/L — SIGNIFICANT CHANGE UP (ref 135–145)
WBC # BLD: 10.74 K/UL — HIGH (ref 3.8–10.5)
WBC # FLD AUTO: 10.74 K/UL — HIGH (ref 3.8–10.5)

## 2022-12-22 PROCEDURE — 99232 SBSQ HOSP IP/OBS MODERATE 35: CPT

## 2022-12-22 PROCEDURE — 99231 SBSQ HOSP IP/OBS SF/LOW 25: CPT

## 2022-12-22 RX ADMIN — LIDOCAINE 1 PATCH: 4 CREAM TOPICAL at 18:56

## 2022-12-22 RX ADMIN — Medication 975 MILLIGRAM(S): at 21:36

## 2022-12-22 RX ADMIN — TRAMADOL HYDROCHLORIDE 50 MILLIGRAM(S): 50 TABLET ORAL at 14:44

## 2022-12-22 RX ADMIN — WARFARIN SODIUM 5 MILLIGRAM(S): 2.5 TABLET ORAL at 21:32

## 2022-12-22 RX ADMIN — ESCITALOPRAM OXALATE 10 MILLIGRAM(S): 10 TABLET, FILM COATED ORAL at 11:01

## 2022-12-22 RX ADMIN — SENNA PLUS 2 TABLET(S): 8.6 TABLET ORAL at 21:32

## 2022-12-22 RX ADMIN — HEPARIN SODIUM 5000 UNIT(S): 5000 INJECTION INTRAVENOUS; SUBCUTANEOUS at 15:54

## 2022-12-22 RX ADMIN — Medication 975 MILLIGRAM(S): at 22:06

## 2022-12-22 RX ADMIN — Medication 1000 MILLIGRAM(S): at 05:43

## 2022-12-22 RX ADMIN — HEPARIN SODIUM 5000 UNIT(S): 5000 INJECTION INTRAVENOUS; SUBCUTANEOUS at 21:33

## 2022-12-22 RX ADMIN — HEPARIN SODIUM 5000 UNIT(S): 5000 INJECTION INTRAVENOUS; SUBCUTANEOUS at 05:32

## 2022-12-22 RX ADMIN — Medication 400 MILLIGRAM(S): at 05:32

## 2022-12-22 RX ADMIN — LIDOCAINE 1 PATCH: 4 CREAM TOPICAL at 21:28

## 2022-12-22 RX ADMIN — LIDOCAINE 1 PATCH: 4 CREAM TOPICAL at 10:57

## 2022-12-22 RX ADMIN — Medication 0.25 MILLIGRAM(S): at 10:57

## 2022-12-22 RX ADMIN — BENZOCAINE AND MENTHOL 1 LOZENGE: 5; 1 LIQUID ORAL at 21:29

## 2022-12-22 RX ADMIN — Medication 0.25 MILLIGRAM(S): at 21:36

## 2022-12-22 RX ADMIN — Medication 1 TABLET(S): at 10:56

## 2022-12-22 RX ADMIN — POLYETHYLENE GLYCOL 3350 17 GRAM(S): 17 POWDER, FOR SOLUTION ORAL at 21:47

## 2022-12-22 RX ADMIN — Medication 975 MILLIGRAM(S): at 15:54

## 2022-12-22 RX ADMIN — GABAPENTIN 300 MILLIGRAM(S): 400 CAPSULE ORAL at 21:32

## 2022-12-22 RX ADMIN — Medication 3 MILLIGRAM(S): at 21:31

## 2022-12-22 RX ADMIN — TRAMADOL HYDROCHLORIDE 50 MILLIGRAM(S): 50 TABLET ORAL at 13:43

## 2022-12-22 NOTE — PROGRESS NOTE ADULT - SUBJECTIVE AND OBJECTIVE BOX
Orthopedics     Pt seen and examined at the bedside. Pain is well controlled at this time, no concerns at this time. 1u PRBC 12/211    Vital Signs Last 24 Hrs  T(C): 36.3 (12-22-22 @ 00:42), Max: 37.1 (12-21-22 @ 15:59)  T(F): 97.4 (12-22-22 @ 00:42), Max: 98.7 (12-21-22 @ 15:59)  HR: 78 (12-22-22 @ 00:42) (76 - 101)  BP: 112/46 (12-22-22 @ 00:42) (91/43 - 124/44)  BP(mean): 63 (12-21-22 @ 16:42) (63 - 63)  RR: 18 (12-22-22 @ 00:42) (16 - 18)  SpO2: 92% (12-22-22 @ 00:42) (92% - 98%)      PT/INR - ( 21 Dec 2022 08:35 )   PT: 13.7 sec;   INR: 1.18 ratio         PTT - ( 20 Dec 2022 04:35 )  PTT:32.6 sec    Exam:  GEN: NAD, awake and alert.  LLE  Dressing clean and dry  +EHL FHL TA GS  SILT L2-S1  +DP  Calf soft and nontender, compartments soft and compressible.      A/P:  77yF s/p L Hip IMN POD #1    -Pain control prn  - DVT ppx  - WBAT/PT/OOB as tolerated   - FU am labs  - Med mgmt, continue home meds.  -Begin D/C planning if stable and  progressing well with PT.   -1U PRBC 12/21  -Ortho stable for d/c when medically stable

## 2022-12-22 NOTE — PROGRESS NOTE ADULT - SUBJECTIVE AND OBJECTIVE BOX
c/c: Fall/left hip pain    HPI:  77F, pmh of chronic back pain VCFs s/p Kyphoplasty,  LLE DVT on coumadin,, GERD, anxiety, osteoporosis, and hepatic cysts presents the ED from [facility] after unwitnessed fall.  As per triage note patient was reaching for heater while sitting on chair fell over onto left side.  In ED, stated she did not pass out. She was unsure why she fell but states she was not able to get up off the floor.  States she has pain in her left hip left side of back.  Denies chest pain shortness of breath nausea vomiting headache neck pain.  Trauma alert was called upon arrival.  does endorse multiple falls in the past year.    In ED, noted to have left intertrochanteric hip fracture and admitted.   states she developed LLE DVT after spine surgery. Initially was on xarelto. Per review of records, was readmitted a month later and found to have DVT LLE but it was unclear if this was worse/same or better than initial DVT a month earlier. She was bridged to coumadin and discharged.   She denies h/o stroke/mi. No sob/chest pain. No f/c/r. no cough/sputum. no dysuria.     Now s/p ORIF 12/20    pt seen and examined this am. Had some pain to surgical site. hadn't been up with physical therapy yet. no sob/chest pain. tolerating po.       Review of system- All 10 systems reviewed and is as per HPI otherwise negative.     Vital Signs Last 24 Hrs  T(C): 37 (22 Dec 2022 08:57), Max: 37.1 (21 Dec 2022 15:59)  T(F): 98.6 (22 Dec 2022 08:57), Max: 98.7 (21 Dec 2022 15:59)  HR: 65 (22 Dec 2022 08:57) (65 - 87)  BP: 93/63 (22 Dec 2022 08:57) (93/63 - 137/51)  BP(mean): 63 (21 Dec 2022 16:42) (63 - 63)  RR: 16 (22 Dec 2022 08:57) (16 - 18)  SpO2: 99% (22 Dec 2022 08:57) (92% - 99%)    Parameters below as of 22 Dec 2022 08:57  Patient On (Oxygen Delivery Method): nasal cannula  O2 Flow (L/min): 2      PHYSICAL EXAM:    GENERAL: Comfortable, no acute distress  HEAD:  Atraumatic, Normocephalic  EYES: EOMI, PERRLA  HEENT: Moist mucous membranes  NECK: Supple, No JVD  NERVOUS SYSTEM:  Alert & Oriented X3, Motor Strength 5/5 B/L upper and lower extremities  CHEST/LUNG: Clear to auscultation bilaterally  HEART: regular rate and rhythm.   ABDOMEN: Soft, Nontender, Nondistended; Bowel sounds present  GENITOURINARY- Voiding, no palpable bladder  EXTREMITIES:  No clubbing, cyanosis, edema  MUSCULOSKELETAL- LLE dressing intact. +underlying hematoma.   SKIN-no rash    LABS:                        8.8    10.74 )-----------( 179      ( 22 Dec 2022 09:00 )             26.9     12-22    145  |  117<H>  |  21  ----------------------------<  114<H>  3.7   |  23  |  1.30    Ca    7.4<L>      22 Dec 2022 09:00  Phos  2.4     12-22  Mg     2.2     12-22      PT/INR - ( 22 Dec 2022 09:00 )   PT: 14.6 sec;   INR: 1.26 ratio                 US Duplex Venous Lower Ext Complete, Bilateral (12.18.22 @ 13:10) >  IMPRESSION:  Persistent DEEP venous thrombosis involving the LEFT femoral, popliteal,   and gastrocnemius veins. This was identified previously on January 29, 2020.      MEDICATIONS  (STANDING):  acetaminophen     Tablet .. 975 milliGRAM(s) Oral every 8 hours  acetaminophen   IVPB .. 1000 milliGRAM(s) IV Intermittent once  benzocaine 15 mG/menthol 3.6 mG Lozenge 1 Lozenge Oral at bedtime  clonazePAM Oral Disintegrating Tablet 0.25 milliGRAM(s) Oral two times a day  escitalopram 10 milliGRAM(s) Oral daily  gabapentin 300 milliGRAM(s) Oral at bedtime  heparin   Injectable 5000 Unit(s) SubCutaneous every 8 hours  lactated ringers. 500 milliLiter(s) (500 mL/Hr) IV Continuous <Continuous>  lidocaine   4% Patch 1 Patch Transdermal daily  melatonin 3 milliGRAM(s) Oral at bedtime  multivitamin 1 Tablet(s) Oral daily  pantoprazole    Tablet 40 milliGRAM(s) Oral before breakfast  polyethylene glycol 3350 17 Gram(s) Oral at bedtime  senna 2 Tablet(s) Oral at bedtime  sodium chloride 0.9%. 1000 milliLiter(s) (50 mL/Hr) IV Continuous <Continuous>  sodium chloride 0.9%. 1000 milliLiter(s) (125 mL/Hr) IV Continuous <Continuous>  warfarin 5 milliGRAM(s) Oral daily    MEDICATIONS  (PRN):  acetaminophen     Tablet .. 650 milliGRAM(s) Oral every 6 hours PRN Temp greater or equal to 38C (100.4F), Mild Pain (1 - 3)  magnesium hydroxide Suspension 30 milliLiter(s) Oral daily PRN Constipation  ondansetron Injectable 4 milliGRAM(s) IV Push every 6 hours PRN Nausea and/or Vomiting  oxyCODONE    IR 5 milliGRAM(s) Oral every 4 hours PRN Moderate Pain (4 - 6)  oxyCODONE    IR 10 milliGRAM(s) Oral every 4 hours PRN Severe Pain (7 - 10)  traMADol 50 milliGRAM(s) Oral every 6 hours PRN Mild Pain (1 - 3)    ASSESSMENT AND PLAN:  77f, PMH as above a/w    1. Left hip fracture:  -s/p ORIF POD#2  -pain control.  -physical therapy post opo  -incentive spirometry  -bowel regimen.     2. Vasovagal episode:  -likely related to hypovolemia, improved with ivf/blood transfusion.     3. Acute blood loss anemia:  -likely related to hematoma at fracture site + surgery  -transfused 1 unit12/21 with improvement.  -repeat cbc in am.     3. Persistent LLE dvt:  -seen by vascular who felt pt did not have any indication for IVC filter.   -coumadin resumed.     4. Anxiety/depression:  -continue klonipin/escitalopram.     5. OAB:  -hold oxybutinin for now.     6. DVT px:  -hep sq/coumadin.     dispo:  [facility] tomorrow if h/h stable.

## 2022-12-22 NOTE — PROGRESS NOTE ADULT - SUBJECTIVE AND OBJECTIVE BOX
HPI:  77-year-old female with h/o  chronic back pain VCFs s/p Kyphoplasty, GERD, anxiety, osteoporosis, and hepatic cysts, LEFT LE DVT on coumadin,  presents the ED from [facility] after unwitnessed fall.  As per triage note patient was reaching for heater while sitting on chair fell over onto left side.  Here patient states that she did not syncopized she is unsure why she fell but states she was not able to get up off the floor.  States she has pain in her left hip left side of back.  Denies chest pain shortness of breath nausea vomiting headache neck pain.  Trauma alert was called upon arrival.  does endorses multiple falls in the past year.      In ED, noted to have left intertrochanteric hip fracture.  [id number] (18 Dec 2022 13:12)      Patient is a 77y old  Female who presents with a chief complaint of fall (20 Dec 2022 08:25)      Consulted by Dr. Teja Ponce  for VTE prophylaxis, risk stratification, and anticoagulation management.    PAST MEDICAL & SURGICAL HISTORY:  Anxiety  left le DVT on coumadin    Chronic GERD      Osteoporosis      Hepatic cyst      Status post lumbar spine operation          FAMILY HISTORY:  FH: diabetes mellitus    FHx: leukemia  mother, age 50s        Interval Note:  22: Pt seen at bedside [id number]. Pt knows her name and that her hip hurts.  Do not remember falling.  Pt made aware she will need prophylactic Anticoagulation post procedure.  Will need more reinforcement.  Pt states she takes coumadin.  Pt made aware we will restart her coumadin when stable post procedure.   2022 Pt sen at bedside on [facility].  Discussed her resuming her coumadin overlapping with heparin.  Pt s/p left hip IMN  on 2022.    2022 Pt seen at bedside on [facility].  Discussed her INR  and coumadin dosing.  Pt for rehab on discharge.     CAPRINI SCORE  AGE RELATED RISK FACTORS                                                       MOBILITY RELATED FACTORS  [ ] Age 41-60 years                                            (1 Point)                  [ ] Bed rest                                                        (1 Point)  [ ] Age: 61-74 years                                           (2 Points)                [ ] Plaster cast                                                   (2 Points)  [x ] Age= 75 years                                              (3 Points)                 [ ] Bed bound for more than 72 hours                   (2 Points)    DISEASE RELATED RISK FACTORS                                               GENDER SPECIFIC FACTORS  [ ] Edema in the lower extremities                       (1 Point)           [ ] Pregnancy                                                            (1 Point)  [ ] Varicose veins                                               (1 Point)                  [ ] Post-partum < 6 weeks                                      (1 Point)             [ ] BMI > 25 Kg/m2                                            (1 Point)                  [ ] Hormonal therapy or oral contraception       (1 Point)                 [ ] Sepsis (in the previous month)                        (1 Point)             [ ] History of pregnancy complications                (1Point)  [ ] Pneumonia or serious lung disease                                             [ ] Unexplained or recurrent  (=/>3), premature                                 (In the previous month)                               (1 Point)                birth with toxemia or growth-restricted infant (1 Point)  [ ] Abnormal pulmonary function test            (1 Point)                                   SURGERY RELATED RISK FACTORS  [ ] Acute myocardial infarction                       (1 Point)                  [ ]  Section                                         (1 Point)  [ ] Congestive heart failure (in the previous month) (1 Point)   [ ] Minor surgery   lasting <45 minutes       (1 Point)   [ ] Inflammatory bowel disease                             (1 Point)          [ ] Arthroscopic surgery                                  (2 Points)  [ ] Central venous access                                    (2 Points)            [ ] General surgery lasting >45 minutes      (2 Points)       [ ] Stroke (in the previous month)                  (5 Points)            [ ] Elective major lower extremity arthroplasty (5 Points)                                   [  ] Malignancy (present or past include skin melanoma                                          but exclude  basal skin cell)    (2 points)                                      TRAUMA RELATED RISK FACTORS                HEMATOLOGY RELATED FACTORS                                  [x ] Fracture of the hip, pelvis, or leg                       (5 Points)  [x ] Prior episodes of VTE                                     (3 Points)          [ ] Acute spinal cord injury (in the previous month)  (5 Points)  [ ] Positive family history for VTE                         (3 Points)       [ ] Paralysis (less than 1 month)                          (5 Points)  [ ] Prothrombin 68119 A                                      (3 Points)         [ ] Multiple Trauma (within 1month)                 (5Points)                                                                                                                                                                [ ] Factor V Leiden                                          (3 Points)                                OTHER RISK FACTORS                          [ ] Lupus anticoagulants                                     (3 Points)                       [ ] BMI > 40                          (1 Point)                                                         [ ] Anticardiolipin antibodies                                (3 Points)                   [ ] Smoking                              (1Point)                                                [ ] High homocysteine in the blood                      (3 Points)                [  ] Diabetes requiring insulin (1point)                         [ ] Other congenital or acquired thrombophilia       (3 Points)          [  ] Chemotherapy                   (1 Point)  [ ] Heparin induced thrombocytopenia                  (3 Points)             [  ] Blood Transfusion                (1 point)                                                                                                             Total Score [ 11 ]                                                                                                                                                                                                                                                                                                                                                                                                                                         IMPROVE Bleeding Risk Score: 1.5    Falls Risk:   High (x  )  Mod (  )  Low (  )  crcl: 72        cr: .60         BMI: 21.1            EBL:  50   ml        Denies any personal or familial history of clotting or bleeding disorders.    Allergies    No Known Drug Allergies  shellfish (Unknown)    Intolerances        REVIEW OF SYSTEMS    (  )Fever	     (  )Constipation	(  )SOB				(  )Headache	(  )Dysuria  (  )Chills	     (  )Melena	(  )Dyspnea present on exertion	                    (  )Dizziness                    (  )Polyuria  (  )Nausea	     (  )Hematochezia	(  )Cough			                    (  )Syncope   	(  )Hematuria  (  )Vomiting    (  )Chest Pain	(  )Wheezing			(  )Weakness  (x) hip pain  (  )Diarrhea     (  )Palpitations	(  )Anorexia			(  )Myalgia    Pertinent positives in HPI and daily subjective.  All other ROS negative.  Vital Signs Last 24 Hrs  T(C): 37 (22 @ 08:57), Max: 37.1 (22 @ 19:23)  T(F): 98.6 (22 @ 08:57), Max: 98.7 (22 @ 19:23)  HR: 65 (22 @ 08:57) (65 - 87)  BP: 93/63 (22 @ 08:57) (93/63 - 137/51)  BP(mean): --  RR: 16 (22 @ 08:57) (16 - 18)  SpO2: 99% (22 @ 08:57) (92% - 99%)  Parameters below as of 20 Dec 2022 08:45  Patient On (Oxygen Delivery Method): room air        PHYSICAL EXAM:    Constitutional: Appears Well    Neurological: A& O x 2 person and place    Skin: Warm    Respiratory and Thorax: normal effort; Breath sounds: normal; No rales/wheezing/rhonchi  	  Cardiovascular: S1, S2, regular, NMBR	    Gastrointestinal: BS + x 4Q, nontender	    Genitourinary:  Bladder nondistended, nontender    Musculoskeletal:   General Right:   no muscle/joint tenderness,   normal tone, no joint swelling,   ROM: full	    General Left:   no muscle/joint tenderness,   normal tone, no joint swelling,   ROM: limited    Hip:  left  dsg CDI     Lower extrems:   Right: no calf tenderness              negative jane's sign               + pedal pulses    Left:   no calf tenderness              negative jane's sign               + pedal pulses                                         8.8    10.74 )-----------( 179      ( 22 Dec 2022 09:00 )             26.9       12-    145  |  117<H>  |  21  ----------------------------<  114<H>  3.7   |  23  |  1.30    Ca    7.4<L>      22 Dec 2022 09:00  Phos  2.4       Mg     2.2                         8.0    10.36 )-----------( 191      ( 21 Dec 2022 08:35 )  one unit of prbc             25.0           145  |  113<H>  |  16  ----------------------------<  142<H>  3.7   |  24  |  0.72    Ca    7.8<L>      21 Dec 2022 08:35    TPro  x   /  Alb  2.5<L>  /  TBili  x   /  DBili  x   /  AST  x   /  ALT  x   /  AlkPhos  x   1220       PTT - ( 20 Dec 2022 04:35 )  PTT:32.6 sec             9.0    9.07  )-----------( 178      ( 20 Dec 2022 04:35 )             27.3       12    142  |  108  |  15  ----------------------------<  129<H>  3.7   |  27  |  0.60    Ca    8.0<L>      20 Dec 2022 04:35    TPro  x   /  Alb  2.5<L>  /  TBili  x   /  DBili  x   /  AST  x   /  ALT  x   /  AlkPhos  x   12  PT/INR - ( 22 Dec 2022 09:00 )   PT: 14.6 sec;   INR: 1.26 ratio    PT/INR - ( 21 Dec 2022 08:35 )   PT: 13.7 sec;   INR: 1.18 ratio    PT/INR - ( 20 Dec 2022 04:35 )   PT: 23.4 sec;   INR: 2.00 ratio VIT K 2.5MG         PTT - ( 20 Dec 2022 04:35 )  PTT:32.6 sec				  < from: Xray Femur 2 Views, Left (22 @ 09:00) >  IMPRESSION:    Displaced left intertrochanteric hip fracture with angulated   foreshortening.    < end of copied text >    MEDICATIONS  (STANDING):  acetaminophen     Tablet .. 975 milliGRAM(s) Oral every 8 hours  benzocaine 15 mG/menthol 3.6 mG Lozenge 1 Lozenge Oral at bedtime  clonazePAM Oral Disintegrating Tablet 0.25 milliGRAM(s) Oral two times a day  escitalopram 10 milliGRAM(s) Oral daily  gabapentin 300 milliGRAM(s) Oral at bedtime  heparin   Injectable 5000 Unit(s) SubCutaneous every 8 hours  lidocaine   4% Patch 1 Patch Transdermal daily  melatonin 3 milliGRAM(s) Oral at bedtime  multivitamin 1 Tablet(s) Oral daily  pantoprazole    Tablet 40 milliGRAM(s) Oral before breakfast  polyethylene glycol 3350 17 Gram(s) Oral at bedtime  senna 2 Tablet(s) Oral at bedtime  warfarin 5 milliGRAM(s) Oral daily              DVT Prophylaxis:  LMWH                   (  )  Heparin SQ           (X  )  Coumadin             ( X )  Xarelto                  (  )  Eliquis                   (  )  Venodynes           ( x )  Ambulation          (X  )  UFH                       (  )  Contraindicated  (  )  EC Aspirin             (  )

## 2022-12-23 LAB
HCT VFR BLD CALC: 23.1 % — LOW (ref 34.5–45)
HGB BLD-MCNC: 7.7 G/DL — LOW (ref 11.5–15.5)
INR BLD: 1.38 RATIO — HIGH (ref 0.88–1.16)
MCHC RBC-ENTMCNC: 31.7 PG — SIGNIFICANT CHANGE UP (ref 27–34)
MCHC RBC-ENTMCNC: 33.3 GM/DL — SIGNIFICANT CHANGE UP (ref 32–36)
MCV RBC AUTO: 95.1 FL — SIGNIFICANT CHANGE UP (ref 80–100)
PLATELET # BLD AUTO: 192 K/UL — SIGNIFICANT CHANGE UP (ref 150–400)
PROTHROM AB SERPL-ACNC: 16.1 SEC — HIGH (ref 10.5–13.4)
RBC # BLD: 2.43 M/UL — LOW (ref 3.8–5.2)
RBC # FLD: 13.5 % — SIGNIFICANT CHANGE UP (ref 10.3–14.5)
WBC # BLD: 7.37 K/UL — SIGNIFICANT CHANGE UP (ref 3.8–10.5)
WBC # FLD AUTO: 7.37 K/UL — SIGNIFICANT CHANGE UP (ref 3.8–10.5)

## 2022-12-23 PROCEDURE — 99232 SBSQ HOSP IP/OBS MODERATE 35: CPT

## 2022-12-23 PROCEDURE — 99231 SBSQ HOSP IP/OBS SF/LOW 25: CPT

## 2022-12-23 RX ORDER — POLYETHYLENE GLYCOL 3350 17 G/17G
17 POWDER, FOR SOLUTION ORAL
Qty: 0 | Refills: 0 | DISCHARGE

## 2022-12-23 RX ORDER — OXYBUTYNIN CHLORIDE 5 MG
1 TABLET ORAL
Qty: 0 | Refills: 0 | DISCHARGE

## 2022-12-23 RX ORDER — SENNA PLUS 8.6 MG/1
2 TABLET ORAL
Qty: 0 | Refills: 0 | DISCHARGE
Start: 2022-12-23

## 2022-12-23 RX ORDER — WARFARIN SODIUM 2.5 MG/1
1 TABLET ORAL
Qty: 0 | Refills: 0 | DISCHARGE

## 2022-12-23 RX ORDER — HYDROCORTISONE 0.5 %
1 CREAM (GRAM) TOPICAL
Qty: 0 | Refills: 0 | DISCHARGE

## 2022-12-23 RX ORDER — LIDOCAINE 4 G/100G
0 CREAM TOPICAL
Qty: 0 | Refills: 0 | DISCHARGE

## 2022-12-23 RX ORDER — ACETAMINOPHEN 500 MG
1 TABLET ORAL
Qty: 0 | Refills: 0 | DISCHARGE

## 2022-12-23 RX ORDER — DENOSUMAB 60 MG/ML
0 INJECTION SUBCUTANEOUS
Qty: 0 | Refills: 0 | DISCHARGE

## 2022-12-23 RX ORDER — WARFARIN SODIUM 2.5 MG/1
1 TABLET ORAL
Qty: 0 | Refills: 0 | DISCHARGE
Start: 2022-12-23

## 2022-12-23 RX ORDER — POLYETHYLENE GLYCOL 3350 17 G/17G
17 POWDER, FOR SOLUTION ORAL
Qty: 0 | Refills: 0 | DISCHARGE
Start: 2022-12-23

## 2022-12-23 RX ORDER — WARFARIN SODIUM 2.5 MG/1
7.5 TABLET ORAL ONCE
Refills: 0 | Status: COMPLETED | OUTPATIENT
Start: 2022-12-23 | End: 2022-12-23

## 2022-12-23 RX ORDER — OXYCODONE HYDROCHLORIDE 5 MG/1
1 TABLET ORAL
Qty: 0 | Refills: 0 | DISCHARGE
Start: 2022-12-23

## 2022-12-23 RX ORDER — HEPARIN SODIUM 5000 [USP'U]/ML
5000 INJECTION INTRAVENOUS; SUBCUTANEOUS
Qty: 0 | Refills: 0 | DISCHARGE
Start: 2022-12-23

## 2022-12-23 RX ADMIN — Medication 1 TABLET(S): at 10:41

## 2022-12-23 RX ADMIN — WARFARIN SODIUM 7.5 MILLIGRAM(S): 2.5 TABLET ORAL at 22:11

## 2022-12-23 RX ADMIN — Medication 0.25 MILLIGRAM(S): at 10:40

## 2022-12-23 RX ADMIN — GABAPENTIN 300 MILLIGRAM(S): 400 CAPSULE ORAL at 22:11

## 2022-12-23 RX ADMIN — Medication 0.25 MILLIGRAM(S): at 22:11

## 2022-12-23 RX ADMIN — Medication 975 MILLIGRAM(S): at 06:11

## 2022-12-23 RX ADMIN — Medication 975 MILLIGRAM(S): at 14:45

## 2022-12-23 RX ADMIN — LIDOCAINE 1 PATCH: 4 CREAM TOPICAL at 10:41

## 2022-12-23 RX ADMIN — ESCITALOPRAM OXALATE 10 MILLIGRAM(S): 10 TABLET, FILM COATED ORAL at 11:34

## 2022-12-23 RX ADMIN — Medication 975 MILLIGRAM(S): at 22:11

## 2022-12-23 RX ADMIN — Medication 3 MILLIGRAM(S): at 22:11

## 2022-12-23 RX ADMIN — PANTOPRAZOLE SODIUM 40 MILLIGRAM(S): 20 TABLET, DELAYED RELEASE ORAL at 05:11

## 2022-12-23 RX ADMIN — Medication 975 MILLIGRAM(S): at 05:11

## 2022-12-23 RX ADMIN — LIDOCAINE 1 PATCH: 4 CREAM TOPICAL at 19:30

## 2022-12-23 RX ADMIN — HEPARIN SODIUM 5000 UNIT(S): 5000 INJECTION INTRAVENOUS; SUBCUTANEOUS at 05:10

## 2022-12-23 RX ADMIN — HEPARIN SODIUM 5000 UNIT(S): 5000 INJECTION INTRAVENOUS; SUBCUTANEOUS at 22:12

## 2022-12-23 RX ADMIN — LIDOCAINE 1 PATCH: 4 CREAM TOPICAL at 22:12

## 2022-12-23 RX ADMIN — HEPARIN SODIUM 5000 UNIT(S): 5000 INJECTION INTRAVENOUS; SUBCUTANEOUS at 14:45

## 2022-12-23 NOTE — PROGRESS NOTE ADULT - SUBJECTIVE AND OBJECTIVE BOX
Orthopedics     Pt seen and examined at the bedside. Pain is well controlled at this time, no concerns at this time.     LABS:                        8.8    10.74 )-----------( 179      ( 22 Dec 2022 09:00 )             26.9     12-22    145  |  117<H>  |  21  ----------------------------<  114<H>  3.7   |  23  |  1.30    Ca    7.4<L>      22 Dec 2022 09:00  Phos  2.4     12-22  Mg     2.2     12-22      PT/INR - ( 22 Dec 2022 09:00 )   PT: 14.6 sec;   INR: 1.26 ratio               VITAL SIGNS:  T(C): 37.1 (12-23-22 @ 00:40), Max: 37.1 (12-23-22 @ 00:40)  HR: 73 (12-23-22 @ 00:40) (65 - 86)  BP: 98/45 (12-23-22 @ 00:40) (93/63 - 99/56)  RR: 17 (12-23-22 @ 00:40) (16 - 17)  SpO2: 94% (12-23-22 @ 00:40) (94% - 99%)      Exam:  GEN: NAD, awake and alert.  LLE  Dressing clean and dry  +EHL FHL TA GS  SILT L2-S1  +DP  Calf soft and nontender, compartments soft and compressible.      A/P:  77yF s/p L Hip IMN POD #3    -Pain control prn  - DVT ppx  - WBAT/PT/OOB as tolerated   - FU am labs  - Med mgmt, continue home meds.  -Begin D/C planning if stable and  progressing well with PT.   -1U PRBC 12/21  -Ortho stable for d/c when medically stable

## 2022-12-23 NOTE — PROGRESS NOTE ADULT - SUBJECTIVE AND OBJECTIVE BOX
c/c: Fall/left hip pain    HPI:  77F, pmh of chronic back pain VCFs s/p Kyphoplasty,  LLE DVT on coumadin,, GERD, anxiety, osteoporosis, and hepatic cysts presents the ED from [facility] after unwitnessed fall.  As per triage note patient was reaching for heater while sitting on chair fell over onto left side.  In ED, stated she did not pass out. She was unsure why she fell but states she was not able to get up off the floor.  States she has pain in her left hip left side of back.  Denies chest pain shortness of breath nausea vomiting headache neck pain.  Trauma alert was called upon arrival.  does endorse multiple falls in the past year.    In ED, noted to have left intertrochanteric hip fracture and admitted.   states she developed LLE DVT after spine surgery. Initially was on xarelto. Per review of records, was readmitted a month later and found to have DVT LLE but it was unclear if this was worse/same or better than initial DVT a month earlier. She was bridged to coumadin and discharged.   She denies h/o stroke/mi. No sob/chest pain. No f/c/r. no cough/sputum. no dysuria.     Now s/p ORIF 12/20 12/23/22- HH low today- for 1U PRBC transfusion. Has not been able to void, s/p straight cath x2    Review of system- All 10 systems reviewed and is as per HPI otherwise negative.     Vital Signs Last 24 Hrs  T(C): 36.8 (23 Dec 2022 08:05), Max: 37.1 (23 Dec 2022 00:40)  T(F): 98.2 (23 Dec 2022 08:05), Max: 98.8 (23 Dec 2022 00:40)  HR: 74 (23 Dec 2022 08:05) (73 - 86)  BP: 109/51 (23 Dec 2022 08:05) (98/45 - 109/51)  BP(mean): --  RR: 17 (23 Dec 2022 08:05) (16 - 17)  SpO2: 94% (23 Dec 2022 08:05) (94% - 95%)    Parameters below as of 23 Dec 2022 08:05  Patient On (Oxygen Delivery Method): room air    PHYSICAL EXAM:  GENERAL: Comfortable, no acute distress  HEAD:  Atraumatic, Normocephalic  EYES: EOMI, PERRLA  HEENT: Moist mucous membranes  NECK: Supple, No JVD  NERVOUS SYSTEM:  Alert & Oriented X3, Motor Strength 5/5 B/L upper and lower extremities  CHEST/LUNG: Clear to auscultation bilaterally  HEART: regular rate and rhythm.   ABDOMEN: Soft, Nontender, Nondistended; Bowel sounds present  GENITOURINARY- not voiding  EXTREMITIES:  No clubbing, cyanosis, edema  MUSCULOSKELETAL- LLE dressing intact. +underlying hematoma.   SKIN-no rash    LABS:                        7.7    7.37  )-----------( 192      ( 23 Dec 2022 10:23 )             23.1     22 Dec 2022 09:00    145    |  117    |  21     ----------------------------<  114    3.7     |  23     |  1.30     Ca    7.4        22 Dec 2022 09:00  Phos  2.4       22 Dec 2022 09:00  Mg     2.2       22 Dec 2022 09:00    PT/INR - ( 23 Dec 2022 09:02 )   PT: 16.1 sec;   INR: 1.38 ratio      US Duplex Venous Lower Ext Complete, Bilateral (12.18.22 @ 13:10) >  IMPRESSION:  Persistent DEEP venous thrombosis involving the LEFT femoral, popliteal,   and gastrocnemius veins. This was identified previously on January 29, 2020.      MEDICATIONS  (STANDING):  acetaminophen     Tablet .. 975 milliGRAM(s) Oral every 8 hours  acetaminophen   IVPB .. 1000 milliGRAM(s) IV Intermittent once  benzocaine 15 mG/menthol 3.6 mG Lozenge 1 Lozenge Oral at bedtime  clonazePAM Oral Disintegrating Tablet 0.25 milliGRAM(s) Oral two times a day  escitalopram 10 milliGRAM(s) Oral daily  gabapentin 300 milliGRAM(s) Oral at bedtime  heparin   Injectable 5000 Unit(s) SubCutaneous every 8 hours  lactated ringers. 500 milliLiter(s) (500 mL/Hr) IV Continuous <Continuous>  lidocaine   4% Patch 1 Patch Transdermal daily  melatonin 3 milliGRAM(s) Oral at bedtime  multivitamin 1 Tablet(s) Oral daily  pantoprazole    Tablet 40 milliGRAM(s) Oral before breakfast  polyethylene glycol 3350 17 Gram(s) Oral at bedtime  senna 2 Tablet(s) Oral at bedtime  sodium chloride 0.9%. 1000 milliLiter(s) (50 mL/Hr) IV Continuous <Continuous>  sodium chloride 0.9%. 1000 milliLiter(s) (125 mL/Hr) IV Continuous <Continuous>  warfarin 5 milliGRAM(s) Oral daily    MEDICATIONS  (PRN):  acetaminophen     Tablet .. 650 milliGRAM(s) Oral every 6 hours PRN Temp greater or equal to 38C (100.4F), Mild Pain (1 - 3)  magnesium hydroxide Suspension 30 milliLiter(s) Oral daily PRN Constipation  ondansetron Injectable 4 milliGRAM(s) IV Push every 6 hours PRN Nausea and/or Vomiting  oxyCODONE    IR 5 milliGRAM(s) Oral every 4 hours PRN Moderate Pain (4 - 6)  oxyCODONE    IR 10 milliGRAM(s) Oral every 4 hours PRN Severe Pain (7 - 10)  traMADol 50 milliGRAM(s) Oral every 6 hours PRN Mild Pain (1 - 3)    ASSESSMENT AND PLAN:  77f, PMH as above a/w    1. Left hip fracture:  -s/p ORIF POD#3  -pain control.  -physical therapy post opo  -incentive spirometry  -bowel regimen.     2. Vasovagal episode:  -likely related to hypovolemia, improved with ivf/blood transfusion.     3. Acute blood loss anemia:  -likely related to hematoma at fracture site + surgery  -transfused 1 unit12/21   -for another 1U PRBC today    3. Persistent LLE dvt:  -seen by vascular who felt pt did not have any indication for IVC filter.   -coumadin resumed.     4. Anxiety/depression:  -continue klonipin/escitalopram.     5. OAB/ now in urinary retention  S/p straight cath x2  Will place Melissa  Outpatient voiding trial at rehab once more mobile    6. DVT px:  -hep sq/coumadin.     dispo: 1U PRBC transfusion today then [facility] later on today with Melissa. DC time 45 mins

## 2022-12-23 NOTE — PROGRESS NOTE ADULT - SUBJECTIVE AND OBJECTIVE BOX
HPI:  77-year-old female with h/o  chronic back pain VCFs s/p Kyphoplasty, GERD, anxiety, osteoporosis, and hepatic cysts, LEFT LE DVT on coumadin,  presents the ED from [facility] after unwitnessed fall.  As per triage note patient was reaching for heater while sitting on chair fell over onto left side.  Here patient states that she did not syncopized she is unsure why she fell but states she was not able to get up off the floor.  States she has pain in her left hip left side of back.  Denies chest pain shortness of breath nausea vomiting headache neck pain.  Trauma alert was called upon arrival.  does endorses multiple falls in the past year.      In ED, noted to have left intertrochanteric hip fracture.  [id number] (18 Dec 2022 13:12)      Patient is a 77y old  Female who presents with a chief complaint of fall (20 Dec 2022 08:25)      Consulted by Dr. Teja oPnce  for VTE prophylaxis, risk stratification, and anticoagulation management.    PAST MEDICAL & SURGICAL HISTORY:  Anxiety  left le DVT on coumadin    Chronic GERD      Osteoporosis      Hepatic cyst      Status post lumbar spine operation          FAMILY HISTORY:  FH: diabetes mellitus    FHx: leukemia  mother, age 50s        Interval Note:  22: Pt seen at bedside [id number]. Pt knows her name and that her hip hurts.  Do not remember falling.  Pt made aware she will need prophylactic Anticoagulation post procedure.  Will need more reinforcement.  Pt states she takes coumadin.  Pt made aware we will restart her coumadin when stable post procedure.   2022 Pt seen at bedside on [facility].  Discussed her resuming her coumadin overlapping with heparin.  Pt s/p left hip IMN  on 2022.    2022 Pt seen at bedside on [facility].  Discussed her INR  and coumadin dosing.  Pt for rehab on discharge.   2022 Pt seen at bedside on [facility].  Discussed with pt the INR  of 1.38 today and increasing her dose to 7.5mg today. Cont on her Heparin sq. Pt will go to rehab on discharge.     CAPRINI SCORE  AGE RELATED RISK FACTORS                                                       MOBILITY RELATED FACTORS  [ ] Age 41-60 years                                            (1 Point)                  [ ] Bed rest                                                        (1 Point)  [ ] Age: 61-74 years                                           (2 Points)                [ ] Plaster cast                                                   (2 Points)  [x ] Age= 75 years                                              (3 Points)                 [ ] Bed bound for more than 72 hours                   (2 Points)    DISEASE RELATED RISK FACTORS                                               GENDER SPECIFIC FACTORS  [ ] Edema in the lower extremities                       (1 Point)           [ ] Pregnancy                                                            (1 Point)  [ ] Varicose veins                                               (1 Point)                  [ ] Post-partum < 6 weeks                                      (1 Point)             [ ] BMI > 25 Kg/m2                                            (1 Point)                  [ ] Hormonal therapy or oral contraception       (1 Point)                 [ ] Sepsis (in the previous month)                        (1 Point)             [ ] History of pregnancy complications                (1Point)  [ ] Pneumonia or serious lung disease                                             [ ] Unexplained or recurrent  (=/>3), premature                                 (In the previous month)                               (1 Point)                birth with toxemia or growth-restricted infant (1 Point)  [ ] Abnormal pulmonary function test            (1 Point)                                   SURGERY RELATED RISK FACTORS  [ ] Acute myocardial infarction                       (1 Point)                  [ ]  Section                                         (1 Point)  [ ] Congestive heart failure (in the previous month) (1 Point)   [ ] Minor surgery   lasting <45 minutes       (1 Point)   [ ] Inflammatory bowel disease                             (1 Point)          [ ] Arthroscopic surgery                                  (2 Points)  [ ] Central venous access                                    (2 Points)            [ ] General surgery lasting >45 minutes      (2 Points)       [ ] Stroke (in the previous month)                  (5 Points)            [ ] Elective major lower extremity arthroplasty (5 Points)                                   [  ] Malignancy (present or past include skin melanoma                                          but exclude  basal skin cell)    (2 points)                                      TRAUMA RELATED RISK FACTORS                HEMATOLOGY RELATED FACTORS                                  [x ] Fracture of the hip, pelvis, or leg                       (5 Points)  [x ] Prior episodes of VTE                                     (3 Points)          [ ] Acute spinal cord injury (in the previous month)  (5 Points)  [ ] Positive family history for VTE                         (3 Points)       [ ] Paralysis (less than 1 month)                          (5 Points)  [ ] Prothrombin 63811 A                                      (3 Points)         [ ] Multiple Trauma (within 1month)                 (5Points)                                                                                                                                                                [ ] Factor V Leiden                                          (3 Points)                                OTHER RISK FACTORS                          [ ] Lupus anticoagulants                                     (3 Points)                       [ ] BMI > 40                          (1 Point)                                                         [ ] Anticardiolipin antibodies                                (3 Points)                   [ ] Smoking                              (1Point)                                                [ ] High homocysteine in the blood                      (3 Points)                [  ] Diabetes requiring insulin (1point)                         [ ] Other congenital or acquired thrombophilia       (3 Points)          [  ] Chemotherapy                   (1 Point)  [ ] Heparin induced thrombocytopenia                  (3 Points)             [  ] Blood Transfusion                (1 point)                                                                                                             Total Score [ 11 ]                                                                                                                                                                                                                                                                                                                                                                                                                                         IMPROVE Bleeding Risk Score: 1.5    Falls Risk:   High (x  )  Mod (  )  Low (  )  crcl: 72        cr: .60         BMI: 21.1            EBL:  50   ml        Denies any personal or familial history of clotting or bleeding disorders.    Allergies    No Known Drug Allergies  shellfish (Unknown)    Intolerances        REVIEW OF SYSTEMS    (  )Fever	     (  )Constipation	(  )SOB				(  )Headache	(  )Dysuria  (  )Chills	     (  )Melena	(  )Dyspnea present on exertion	                    (  )Dizziness                    (  )Polyuria  (  )Nausea	     (  )Hematochezia	(  )Cough			                    (  )Syncope   	(  )Hematuria  (  )Vomiting    (  )Chest Pain	(  )Wheezing			(  )Weakness  (x) hip pain  (  )Diarrhea     (  )Palpitations	(  )Anorexia			(  )Myalgia    Pertinent positives in HPI and daily subjective.  All other ROS negative.    Vital Signs Last 24 Hrs  T(C): 36.8 (22 @ 08:05), Max: 37.1 (22 @ 00:40)  T(F): 98.2 (22 @ 08:05), Max: 98.8 (22 @ 00:40)  HR: 74 (22 @ 08:05) (73 - 86)  BP: 109/51 (22 @ 08:05) (98/45 - 109/51)  BP(mean): --  RR: 17 (22 @ 08:05) (16 - 17)  SpO2: 94% (22 @ 08:05) (94% - 95%)  Parameters below as of 20 Dec 2022 08:45  Patient On (Oxygen Delivery Method): room air        PHYSICAL EXAM:    Constitutional: Appears Well    Neurological: A& O x 2 person and place    Skin: Warm    Respiratory and Thorax: normal effort; Breath sounds: normal; No rales/wheezing/rhonchi  	  Cardiovascular: S1, S2, regular, NMBR	    Gastrointestinal: BS + x 4Q, nontender	    Genitourinary:  Bladder nondistended, nontender    Musculoskeletal:   General Right:   no muscle/joint tenderness,   normal tone, no joint swelling,   ROM: full	    General Left:   no muscle/joint tenderness,   normal tone, no joint swelling,   ROM: limited    Hip:  left  dsg CDI     Lower extrems:   Right: no calf tenderness              negative jane's sign               + pedal pulses    Left:   no calf tenderness              negative jane's sign               + pedal pulses                                     7.7    7.37  )-----------( 192      ( 23 Dec 2022 10:23 ) one unit of PRBC             23.1       12-22    145  |  117<H>  |  21  ----------------------------<  114<H>  3.7   |  23  |  1.30    Ca    7.4<L>      22 Dec 2022 09:00  Phos  2.4     12-22  Mg     2.2                                   8.8    10.74 )-----------( 179      ( 22 Dec 2022 09:00 )             26.9       12-    145  |  117<H>  |  21  ----------------------------<  114<H>  3.7   |  23  |  1.30    Ca    7.4<L>      22 Dec 2022 09:00  Phos  2.4     12-22  Mg     2.2                         8.0    10.36 )-----------( 191      ( 21 Dec 2022 08:35 )  one unit of prbc             25.0       -    145  |  113<H>  |  16  ----------------------------<  142<H>  3.7   |  24  |  0.72    Ca    7.8<L>      21 Dec 2022 08:35    TPro  x   /  Alb  2.5<L>  /  TBili  x   /  DBili  x   /  AST  x   /  ALT  x   /  AlkPhos  x   12-20       PTT - ( 20 Dec 2022 04:35 )  PTT:32.6 sec             9.0    9.07  )-----------( 178      ( 20 Dec 2022 04:35 )             27.3       12-20    142  |  108  |  15  ----------------------------<  129<H>  3.7   |  27  |  0.60    Ca    8.0<L>      20 Dec 2022 04:35    TPro  x   /  Alb  2.5<L>  /  TBili  x   /  DBili  x   /  AST  x   /  ALT  x   /  AlkPhos  x   12-20  PT/INR - ( 23 Dec 2022 09:02 )   PT: 16.1 sec;   INR: 1.38 ratio    PT/INR - ( 22 Dec 2022 09:00 )   PT: 14.6 sec;   INR: 1.26 ratio    PT/INR - ( 21 Dec 2022 08:35 )   PT: 13.7 sec;   INR: 1.18 ratio    PT/INR - ( 20 Dec 2022 04:35 )   PT: 23.4 sec;   INR: 2.00 ratio VIT K 2.5MG         PTT - ( 20 Dec 2022 04:35 )  PTT:32.6 sec				  < from: Xray Femur 2 Views, Left (.22 @ 09:00) >  IMPRESSION:    Displaced left intertrochanteric hip fracture with angulated   foreshortening.    < end of copied text >    MEDICATIONS  (STANDING):  acetaminophen     Tablet .. 975 milliGRAM(s) Oral every 8 hours  benzocaine 15 mG/menthol 3.6 mG Lozenge 1 Lozenge Oral at bedtime  clonazePAM Oral Disintegrating Tablet 0.25 milliGRAM(s) Oral two times a day  escitalopram 10 milliGRAM(s) Oral daily  gabapentin 300 milliGRAM(s) Oral at bedtime  heparin   Injectable 5000 Unit(s) SubCutaneous every 8 hours  lidocaine   4% Patch 1 Patch Transdermal daily  melatonin 3 milliGRAM(s) Oral at bedtime  multivitamin 1 Tablet(s) Oral daily  pantoprazole    Tablet 40 milliGRAM(s) Oral before breakfast  polyethylene glycol 3350 17 Gram(s) Oral at bedtime  senna 2 Tablet(s) Oral at bedtime  warfarin 7.5 milliGRAM(s) Oral once              DVT Prophylaxis:  LMWH                   (  )  Heparin SQ           (X  )  Coumadin             ( X )  Xarelto                  (  )  Eliquis                   (  )  Venodynes           ( x )  Ambulation          (X  )  UFH                       (  )  Contraindicated  (  )  EC Aspirin             (  )

## 2022-12-24 LAB
ANION GAP SERPL CALC-SCNC: 4 MMOL/L — LOW (ref 5–17)
BUN SERPL-MCNC: 19 MG/DL — SIGNIFICANT CHANGE UP (ref 7–23)
CALCIUM SERPL-MCNC: 8 MG/DL — LOW (ref 8.5–10.1)
CHLORIDE SERPL-SCNC: 113 MMOL/L — HIGH (ref 96–108)
CO2 SERPL-SCNC: 28 MMOL/L — SIGNIFICANT CHANGE UP (ref 22–31)
CREAT SERPL-MCNC: 0.45 MG/DL — LOW (ref 0.5–1.3)
EGFR: 100 ML/MIN/1.73M2 — SIGNIFICANT CHANGE UP
GLUCOSE SERPL-MCNC: 110 MG/DL — HIGH (ref 70–99)
HCT VFR BLD CALC: 27.2 % — LOW (ref 34.5–45)
HGB BLD-MCNC: 9.2 G/DL — LOW (ref 11.5–15.5)
INR BLD: 1.51 RATIO — HIGH (ref 0.88–1.16)
MCHC RBC-ENTMCNC: 31.4 PG — SIGNIFICANT CHANGE UP (ref 27–34)
MCHC RBC-ENTMCNC: 33.8 GM/DL — SIGNIFICANT CHANGE UP (ref 32–36)
MCV RBC AUTO: 92.8 FL — SIGNIFICANT CHANGE UP (ref 80–100)
PLATELET # BLD AUTO: 221 K/UL — SIGNIFICANT CHANGE UP (ref 150–400)
POTASSIUM SERPL-MCNC: 3.1 MMOL/L — LOW (ref 3.5–5.3)
POTASSIUM SERPL-SCNC: 3.1 MMOL/L — LOW (ref 3.5–5.3)
PROTHROM AB SERPL-ACNC: 17.6 SEC — HIGH (ref 10.5–13.4)
RBC # BLD: 2.93 M/UL — LOW (ref 3.8–5.2)
RBC # FLD: 13.5 % — SIGNIFICANT CHANGE UP (ref 10.3–14.5)
SODIUM SERPL-SCNC: 145 MMOL/L — SIGNIFICANT CHANGE UP (ref 135–145)
WBC # BLD: 7.92 K/UL — SIGNIFICANT CHANGE UP (ref 3.8–10.5)
WBC # FLD AUTO: 7.92 K/UL — SIGNIFICANT CHANGE UP (ref 3.8–10.5)

## 2022-12-24 PROCEDURE — 99232 SBSQ HOSP IP/OBS MODERATE 35: CPT

## 2022-12-24 RX ORDER — WARFARIN SODIUM 2.5 MG/1
5 TABLET ORAL DAILY
Refills: 0 | Status: COMPLETED | OUTPATIENT
Start: 2022-12-24 | End: 2022-12-26

## 2022-12-24 RX ORDER — POTASSIUM CHLORIDE 20 MEQ
40 PACKET (EA) ORAL EVERY 4 HOURS
Refills: 0 | Status: COMPLETED | OUTPATIENT
Start: 2022-12-24 | End: 2022-12-24

## 2022-12-24 RX ADMIN — TRAMADOL HYDROCHLORIDE 50 MILLIGRAM(S): 50 TABLET ORAL at 21:30

## 2022-12-24 RX ADMIN — Medication 0.25 MILLIGRAM(S): at 09:49

## 2022-12-24 RX ADMIN — LIDOCAINE 1 PATCH: 4 CREAM TOPICAL at 09:49

## 2022-12-24 RX ADMIN — LIDOCAINE 1 PATCH: 4 CREAM TOPICAL at 21:58

## 2022-12-24 RX ADMIN — LIDOCAINE 1 PATCH: 4 CREAM TOPICAL at 18:43

## 2022-12-24 RX ADMIN — POLYETHYLENE GLYCOL 3350 17 GRAM(S): 17 POWDER, FOR SOLUTION ORAL at 21:29

## 2022-12-24 RX ADMIN — Medication 40 MILLIEQUIVALENT(S): at 14:02

## 2022-12-24 RX ADMIN — WARFARIN SODIUM 5 MILLIGRAM(S): 2.5 TABLET ORAL at 21:30

## 2022-12-24 RX ADMIN — Medication 975 MILLIGRAM(S): at 21:58

## 2022-12-24 RX ADMIN — OXYCODONE HYDROCHLORIDE 5 MILLIGRAM(S): 5 TABLET ORAL at 14:02

## 2022-12-24 RX ADMIN — Medication 0.25 MILLIGRAM(S): at 21:29

## 2022-12-24 RX ADMIN — OXYCODONE HYDROCHLORIDE 5 MILLIGRAM(S): 5 TABLET ORAL at 15:00

## 2022-12-24 RX ADMIN — Medication 975 MILLIGRAM(S): at 05:56

## 2022-12-24 RX ADMIN — ESCITALOPRAM OXALATE 10 MILLIGRAM(S): 10 TABLET, FILM COATED ORAL at 09:49

## 2022-12-24 RX ADMIN — GABAPENTIN 300 MILLIGRAM(S): 400 CAPSULE ORAL at 21:30

## 2022-12-24 RX ADMIN — Medication 975 MILLIGRAM(S): at 21:29

## 2022-12-24 RX ADMIN — Medication 3 MILLIGRAM(S): at 21:30

## 2022-12-24 RX ADMIN — HEPARIN SODIUM 5000 UNIT(S): 5000 INJECTION INTRAVENOUS; SUBCUTANEOUS at 21:29

## 2022-12-24 RX ADMIN — Medication 1 TABLET(S): at 09:49

## 2022-12-24 RX ADMIN — Medication 975 MILLIGRAM(S): at 15:00

## 2022-12-24 RX ADMIN — PANTOPRAZOLE SODIUM 40 MILLIGRAM(S): 20 TABLET, DELAYED RELEASE ORAL at 05:56

## 2022-12-24 RX ADMIN — TRAMADOL HYDROCHLORIDE 50 MILLIGRAM(S): 50 TABLET ORAL at 12:43

## 2022-12-24 RX ADMIN — TRAMADOL HYDROCHLORIDE 50 MILLIGRAM(S): 50 TABLET ORAL at 13:30

## 2022-12-24 RX ADMIN — HEPARIN SODIUM 5000 UNIT(S): 5000 INJECTION INTRAVENOUS; SUBCUTANEOUS at 14:03

## 2022-12-24 RX ADMIN — Medication 975 MILLIGRAM(S): at 14:03

## 2022-12-24 RX ADMIN — TRAMADOL HYDROCHLORIDE 50 MILLIGRAM(S): 50 TABLET ORAL at 22:05

## 2022-12-24 RX ADMIN — Medication 40 MILLIEQUIVALENT(S): at 09:49

## 2022-12-24 RX ADMIN — SENNA PLUS 2 TABLET(S): 8.6 TABLET ORAL at 21:29

## 2022-12-24 RX ADMIN — HEPARIN SODIUM 5000 UNIT(S): 5000 INJECTION INTRAVENOUS; SUBCUTANEOUS at 06:00

## 2022-12-24 NOTE — PROGRESS NOTE ADULT - SUBJECTIVE AND OBJECTIVE BOX
c/c: Fall/left hip pain    HPI:  77F, pmh of chronic back pain VCFs s/p Kyphoplasty,  LLE DVT on coumadin,, GERD, anxiety, osteoporosis, and hepatic cysts presents the ED from [facility] after unwitnessed fall.  As per triage note patient was reaching for heater while sitting on chair fell over onto left side.  In ED, stated she did not pass out. She was unsure why she fell but states she was not able to get up off the floor.  States she has pain in her left hip left side of back.  Denies chest pain shortness of breath nausea vomiting headache neck pain.  Trauma alert was called upon arrival.  does endorse multiple falls in the past year.    In ED, noted to have left intertrochanteric hip fracture and admitted.   states she developed LLE DVT after spine surgery. Initially was on xarelto. Per review of records, was readmitted a month later and found to have DVT LLE but it was unclear if this was worse/same or better than initial DVT a month earlier. She was bridged to coumadin and discharged.   She denies h/o stroke/mi. No sob/chest pain. No f/c/r. no cough/sputum. no dysuria.     Now s/p ORIF 12/20. transfused for anemia related to surgery/post op hematoma. required feng for retention        Review of system- All 10 systems reviewed and is as per HPI otherwise negative.     Vital Signs Last 24 Hrs  T(C): 36.6 (24 Dec 2022 09:24), Max: 36.9 (23 Dec 2022 20:30)  T(F): 97.8 (24 Dec 2022 09:24), Max: 98.5 (23 Dec 2022 20:30)  HR: 66 (24 Dec 2022 09:24) (66 - 88)  BP: 119/58 (24 Dec 2022 09:24) (116/62 - 127/56)  BP(mean): 72 (23 Dec 2022 15:51) (72 - 72)  RR: 16 (24 Dec 2022 09:24) (16 - 17)  SpO2: 95% (24 Dec 2022 09:24) (95% - 96%)    Parameters below as of 24 Dec 2022 09:24  Patient On (Oxygen Delivery Method): room air        PHYSICAL EXAM:  GENERAL: Comfortable, no acute distress  HEAD:  Atraumatic, Normocephalic  EYES: EOMI, PERRLA  HEENT: Moist mucous membranes  NECK: Supple, No JVD  NERVOUS SYSTEM:  Alert & Oriented X3, Motor Strength 5/5 B/L upper and lower extremities  CHEST/LUNG: Clear to auscultation bilaterally  HEART: regular rate and rhythm.   ABDOMEN: Soft, Nontender, Nondistended; Bowel sounds present  GENITOURINARY- not voiding  EXTREMITIES:  No clubbing, cyanosis, edema  MUSCULOSKELETAL- LLE dressing intact. +underlying hematoma.   SKIN-no rash  LABS:                        9.2    7.92  )-----------( 221      ( 24 Dec 2022 07:40 )             27.2     12-24    145  |  113<H>  |  19  ----------------------------<  110<H>  3.1<L>   |  28  |  0.45<L>    Ca    8.0<L>      24 Dec 2022 07:40      PT/INR - ( 24 Dec 2022 09:53 )   PT: 17.6 sec;   INR: 1.51 ratio           US Duplex Venous Lower Ext Complete, Bilateral (12.18.22 @ 13:10) >  IMPRESSION:  Persistent DEEP venous thrombosis involving the LEFT femoral, popliteal,   and gastrocnemius veins. This was identified previously on January 29, 2020.    MEDICATIONS  (STANDING):  acetaminophen     Tablet .. 975 milliGRAM(s) Oral every 8 hours  benzocaine 15 mG/menthol 3.6 mG Lozenge 1 Lozenge Oral at bedtime  clonazePAM Oral Disintegrating Tablet 0.25 milliGRAM(s) Oral two times a day  escitalopram 10 milliGRAM(s) Oral daily  gabapentin 300 milliGRAM(s) Oral at bedtime  heparin   Injectable 5000 Unit(s) SubCutaneous every 8 hours  lidocaine   4% Patch 1 Patch Transdermal daily  melatonin 3 milliGRAM(s) Oral at bedtime  multivitamin 1 Tablet(s) Oral daily  pantoprazole    Tablet 40 milliGRAM(s) Oral before breakfast  polyethylene glycol 3350 17 Gram(s) Oral at bedtime  potassium chloride    Tablet ER 40 milliEquivalent(s) Oral every 4 hours  senna 2 Tablet(s) Oral at bedtime  warfarin 5 milliGRAM(s) Oral daily    MEDICATIONS  (PRN):  acetaminophen     Tablet .. 650 milliGRAM(s) Oral every 6 hours PRN Temp greater or equal to 38C (100.4F), Mild Pain (1 - 3)  magnesium hydroxide Suspension 30 milliLiter(s) Oral daily PRN Constipation  ondansetron Injectable 4 milliGRAM(s) IV Push every 6 hours PRN Nausea and/or Vomiting  oxyCODONE    IR 5 milliGRAM(s) Oral every 4 hours PRN Moderate Pain (4 - 6)  oxyCODONE    IR 10 milliGRAM(s) Oral every 4 hours PRN Severe Pain (7 - 10)  traMADol 50 milliGRAM(s) Oral every 6 hours PRN Mild Pain (1 - 3)    ASSESSMENT AND PLAN:  77f, PMH as above a/w    1. Left hip fracture:  -s/p ORIF POD#4  -pain control.  -physical therapy post opo  -incentive spirometry  -bowel regimen.     2. Vasovagal episode:  -likely related to hypovolemia, improved with ivf/blood transfusion.     3. Acute blood loss anemia:  -likely related to hematoma at fracture site + surgery  -transfused 1 unit12/21 , 12/23    4. Persistent LLE dvt:  -seen by vascular who felt pt did not have any indication for IVC filter.   -coumadin resumed.     4. Anxiety/depression:  -continue klonipin/escitalopram.     5. OAB/ now in urinary retention  feng placed 12/23  Outpatient voiding trial at rehab once more mobile    6. DVT px:  -hep sq/coumadin.                c/c: Fall/left hip pain    HPI:  77F, pmh of chronic back pain VCFs s/p Kyphoplasty,  LLE DVT on coumadin,, GERD, anxiety, osteoporosis, and hepatic cysts presents the ED from [facility] after unwitnessed fall.  As per triage note patient was reaching for heater while sitting on chair fell over onto left side.  In ED, stated she did not pass out. She was unsure why she fell but states she was not able to get up off the floor.  States she has pain in her left hip left side of back.  Denies chest pain shortness of breath nausea vomiting headache neck pain.  Trauma alert was called upon arrival.  does endorse multiple falls in the past year.    In ED, noted to have left intertrochanteric hip fracture and admitted.   states she developed LLE DVT after spine surgery. Initially was on xarelto. Per review of records, was readmitted a month later and found to have DVT LLE but it was unclear if this was worse/same or better than initial DVT a month earlier. She was bridged to coumadin and discharged.   She denies h/o stroke/mi. No sob/chest pain. No f/c/r. no cough/sputum. no dysuria.     Now s/p ORIF 12/20. transfused for anemia related to surgery/post op hematoma. required feng for retention    Pt seen and examined this am. Felt tired. no sob/chest pain. hadn't eaten or been out of bed yet  No pain at rest.       Review of system- All 10 systems reviewed and is as per HPI otherwise negative.     Vital Signs Last 24 Hrs  T(C): 36.6 (24 Dec 2022 09:24), Max: 36.9 (23 Dec 2022 20:30)  T(F): 97.8 (24 Dec 2022 09:24), Max: 98.5 (23 Dec 2022 20:30)  HR: 66 (24 Dec 2022 09:24) (66 - 88)  BP: 119/58 (24 Dec 2022 09:24) (116/62 - 127/56)  BP(mean): 72 (23 Dec 2022 15:51) (72 - 72)  RR: 16 (24 Dec 2022 09:24) (16 - 17)  SpO2: 95% (24 Dec 2022 09:24) (95% - 96%)    Parameters below as of 24 Dec 2022 09:24  Patient On (Oxygen Delivery Method): room air        PHYSICAL EXAM:  GENERAL: Comfortable, no acute distress  HEAD:  Atraumatic, Normocephalic  EYES: EOMI, PERRLA  HEENT: Moist mucous membranes  NECK: Supple, No JVD  NERVOUS SYSTEM:  Alert & Oriented X3, Motor Strength 5/5 B/L upper and lower extremities  CHEST/LUNG: Clear to auscultation bilaterally  HEART: regular rate and rhythm.   ABDOMEN: Soft, Nontender, Nondistended; Bowel sounds present  GENITOURINARY- not voiding  EXTREMITIES:  No clubbing, cyanosis, edema  MUSCULOSKELETAL- LLE dressing intact. +underlying hematoma.   SKIN-no rash  LABS:                        9.2    7.92  )-----------( 221      ( 24 Dec 2022 07:40 )             27.2     12-24    145  |  113<H>  |  19  ----------------------------<  110<H>  3.1<L>   |  28  |  0.45<L>    Ca    8.0<L>      24 Dec 2022 07:40      PT/INR - ( 24 Dec 2022 09:53 )   PT: 17.6 sec;   INR: 1.51 ratio           US Duplex Venous Lower Ext Complete, Bilateral (12.18.22 @ 13:10) >  IMPRESSION:  Persistent DEEP venous thrombosis involving the LEFT femoral, popliteal,   and gastrocnemius veins. This was identified previously on January 29, 2020.    MEDICATIONS  (STANDING):  acetaminophen     Tablet .. 975 milliGRAM(s) Oral every 8 hours  benzocaine 15 mG/menthol 3.6 mG Lozenge 1 Lozenge Oral at bedtime  clonazePAM Oral Disintegrating Tablet 0.25 milliGRAM(s) Oral two times a day  escitalopram 10 milliGRAM(s) Oral daily  gabapentin 300 milliGRAM(s) Oral at bedtime  heparin   Injectable 5000 Unit(s) SubCutaneous every 8 hours  lidocaine   4% Patch 1 Patch Transdermal daily  melatonin 3 milliGRAM(s) Oral at bedtime  multivitamin 1 Tablet(s) Oral daily  pantoprazole    Tablet 40 milliGRAM(s) Oral before breakfast  polyethylene glycol 3350 17 Gram(s) Oral at bedtime  potassium chloride    Tablet ER 40 milliEquivalent(s) Oral every 4 hours  senna 2 Tablet(s) Oral at bedtime  warfarin 5 milliGRAM(s) Oral daily    MEDICATIONS  (PRN):  acetaminophen     Tablet .. 650 milliGRAM(s) Oral every 6 hours PRN Temp greater or equal to 38C (100.4F), Mild Pain (1 - 3)  magnesium hydroxide Suspension 30 milliLiter(s) Oral daily PRN Constipation  ondansetron Injectable 4 milliGRAM(s) IV Push every 6 hours PRN Nausea and/or Vomiting  oxyCODONE    IR 5 milliGRAM(s) Oral every 4 hours PRN Moderate Pain (4 - 6)  oxyCODONE    IR 10 milliGRAM(s) Oral every 4 hours PRN Severe Pain (7 - 10)  traMADol 50 milliGRAM(s) Oral every 6 hours PRN Mild Pain (1 - 3)    ASSESSMENT AND PLAN:  77f, PMH as above a/w    1. Left hip fracture:  -s/p ORIF POD#4  -pain control.  -physical therapy post opo  -incentive spirometry  -bowel regimen.     2. Vasovagal episode:  -likely related to hypovolemia, improved with ivf/blood transfusion.     3. Acute blood loss anemia:  -likely related to hematoma at fracture site + surgery  -transfused 1 unit12/21 , 12/23    4. Persistent LLE dvt:  -seen by vascular who felt pt did not have any indication for IVC filter.   -coumadin resumed.     4. Anxiety/depression:  -continue klonipin/escitalopram.     5. OAB/ now in urinary retention  feng placed 12/23  Outpatient voiding trial at rehab once more mobile    6. DVT px:  -hep sq/coumadin.

## 2022-12-24 NOTE — PROGRESS NOTE ADULT - SUBJECTIVE AND OBJECTIVE BOX
Orthopedics     Pt seen and examined at the bedside. Pain is well controlled at this time, no concerns at this time.                           7.7    7.37  )-----------( 192      ( 23 Dec 2022 10:23 )             23.1       12-22    145  |  117<H>  |  21  ----------------------------<  114<H>  3.7   |  23  |  1.30    Ca    7.4<L>      22 Dec 2022 09:00  Phos  2.4     12-22  Mg     2.2     12-22                PT/INR - ( 23 Dec 2022 09:02 )   PT: 16.1 sec;   INR: 1.38 ratio           Vital Signs Last 24 Hrs  T(C): 36.5 (24 Dec 2022 05:09), Max: 36.9 (23 Dec 2022 12:11)  T(F): 97.7 (24 Dec 2022 05:09), Max: 98.5 (23 Dec 2022 12:11)  HR: 67 (24 Dec 2022 05:09) (67 - 88)  BP: 116/62 (24 Dec 2022 05:09) (109/51 - 127/56)  BP(mean): 72 (23 Dec 2022 15:51) (69 - 72)  RR: 17 (24 Dec 2022 05:09) (16 - 18)  SpO2: 95% (24 Dec 2022 05:09) (93% - 96%)    Parameters below as of 24 Dec 2022 05:09  Patient On (Oxygen Delivery Method): room air        Exam:  GEN: NAD, awake and alert.  LLE  Dressing clean and dry  +EHL FHL TA GS  SILT L2-S1  +DP  Calf soft and nontender, compartments soft and compressible.      A/P:  77yF s/p L Hip IMN POD #4    -Pain control prn  - DVT ppx  - WBAT/PT/OOB as tolerated   - FU am labs  - Med mgmt, continue home meds.  -Begin D/C planning if stable and  progressing well with PT.   -1U PRBC 12/21, 12/23  -Ortho stable for d/c when medically stable

## 2022-12-25 LAB
ANION GAP SERPL CALC-SCNC: 4 MMOL/L — LOW (ref 5–17)
BUN SERPL-MCNC: 16 MG/DL — SIGNIFICANT CHANGE UP (ref 7–23)
CALCIUM SERPL-MCNC: 8.3 MG/DL — LOW (ref 8.5–10.1)
CHLORIDE SERPL-SCNC: 112 MMOL/L — HIGH (ref 96–108)
CO2 SERPL-SCNC: 28 MMOL/L — SIGNIFICANT CHANGE UP (ref 22–31)
CREAT SERPL-MCNC: 0.39 MG/DL — LOW (ref 0.5–1.3)
EGFR: 103 ML/MIN/1.73M2 — SIGNIFICANT CHANGE UP
GLUCOSE SERPL-MCNC: 103 MG/DL — HIGH (ref 70–99)
HCT VFR BLD CALC: 28.6 % — LOW (ref 34.5–45)
HGB BLD-MCNC: 9.5 G/DL — LOW (ref 11.5–15.5)
INR BLD: 2 RATIO — HIGH (ref 0.88–1.16)
MCHC RBC-ENTMCNC: 31.1 PG — SIGNIFICANT CHANGE UP (ref 27–34)
MCHC RBC-ENTMCNC: 33.2 GM/DL — SIGNIFICANT CHANGE UP (ref 32–36)
MCV RBC AUTO: 93.8 FL — SIGNIFICANT CHANGE UP (ref 80–100)
PLATELET # BLD AUTO: 258 K/UL — SIGNIFICANT CHANGE UP (ref 150–400)
POTASSIUM SERPL-MCNC: 3.6 MMOL/L — SIGNIFICANT CHANGE UP (ref 3.5–5.3)
POTASSIUM SERPL-SCNC: 3.6 MMOL/L — SIGNIFICANT CHANGE UP (ref 3.5–5.3)
PROTHROM AB SERPL-ACNC: 23.4 SEC — HIGH (ref 10.5–13.4)
RBC # BLD: 3.05 M/UL — LOW (ref 3.8–5.2)
RBC # FLD: 13.8 % — SIGNIFICANT CHANGE UP (ref 10.3–14.5)
SODIUM SERPL-SCNC: 144 MMOL/L — SIGNIFICANT CHANGE UP (ref 135–145)
WBC # BLD: 7.13 K/UL — SIGNIFICANT CHANGE UP (ref 3.8–10.5)
WBC # FLD AUTO: 7.13 K/UL — SIGNIFICANT CHANGE UP (ref 3.8–10.5)

## 2022-12-25 PROCEDURE — 99232 SBSQ HOSP IP/OBS MODERATE 35: CPT

## 2022-12-25 RX ORDER — POLYETHYLENE GLYCOL 3350 17 G/17G
17 POWDER, FOR SOLUTION ORAL DAILY
Refills: 0 | Status: DISCONTINUED | OUTPATIENT
Start: 2022-12-25 | End: 2022-12-27

## 2022-12-25 RX ORDER — LACTULOSE 10 G/15ML
20 SOLUTION ORAL
Refills: 0 | Status: DISCONTINUED | OUTPATIENT
Start: 2022-12-25 | End: 2022-12-25

## 2022-12-25 RX ADMIN — Medication 3 MILLIGRAM(S): at 21:01

## 2022-12-25 RX ADMIN — POLYETHYLENE GLYCOL 3350 17 GRAM(S): 17 POWDER, FOR SOLUTION ORAL at 21:01

## 2022-12-25 RX ADMIN — GABAPENTIN 300 MILLIGRAM(S): 400 CAPSULE ORAL at 21:01

## 2022-12-25 RX ADMIN — ESCITALOPRAM OXALATE 10 MILLIGRAM(S): 10 TABLET, FILM COATED ORAL at 10:52

## 2022-12-25 RX ADMIN — Medication 975 MILLIGRAM(S): at 14:47

## 2022-12-25 RX ADMIN — PANTOPRAZOLE SODIUM 40 MILLIGRAM(S): 20 TABLET, DELAYED RELEASE ORAL at 05:21

## 2022-12-25 RX ADMIN — Medication 975 MILLIGRAM(S): at 05:21

## 2022-12-25 RX ADMIN — Medication 975 MILLIGRAM(S): at 21:01

## 2022-12-25 RX ADMIN — SENNA PLUS 2 TABLET(S): 8.6 TABLET ORAL at 21:01

## 2022-12-25 RX ADMIN — Medication 975 MILLIGRAM(S): at 21:29

## 2022-12-25 RX ADMIN — Medication 1 TABLET(S): at 10:50

## 2022-12-25 RX ADMIN — WARFARIN SODIUM 5 MILLIGRAM(S): 2.5 TABLET ORAL at 21:01

## 2022-12-25 RX ADMIN — LIDOCAINE 1 PATCH: 4 CREAM TOPICAL at 10:54

## 2022-12-25 RX ADMIN — Medication 0.25 MILLIGRAM(S): at 21:01

## 2022-12-25 RX ADMIN — HEPARIN SODIUM 5000 UNIT(S): 5000 INJECTION INTRAVENOUS; SUBCUTANEOUS at 05:21

## 2022-12-25 RX ADMIN — Medication 0.25 MILLIGRAM(S): at 10:50

## 2022-12-25 RX ADMIN — LIDOCAINE 1 PATCH: 4 CREAM TOPICAL at 19:58

## 2022-12-25 RX ADMIN — LIDOCAINE 1 PATCH: 4 CREAM TOPICAL at 22:22

## 2022-12-25 RX ADMIN — Medication 975 MILLIGRAM(S): at 05:30

## 2022-12-25 NOTE — PROGRESS NOTE ADULT - SUBJECTIVE AND OBJECTIVE BOX
Orthopedics     Pt seen and examined at the bedside. Pain is well controlled at this time, no concerns at this time.       Vital Signs Last 24 Hrs  T(C): 36.8 (25 Dec 2022 05:05), Max: 37.2 (25 Dec 2022 00:07)  T(F): 98.2 (25 Dec 2022 05:05), Max: 98.9 (25 Dec 2022 00:07)  HR: 70 (25 Dec 2022 05:05) (66 - 90)  BP: 127/58 (25 Dec 2022 05:05) (113/46 - 127/58)  BP(mean): --  RR: 16 (25 Dec 2022 05:05) (16 - 17)  SpO2: 96% (25 Dec 2022 05:05) (94% - 96%)    Parameters below as of 25 Dec 2022 05:05  Patient On (Oxygen Delivery Method): room air        Exam:  GEN: NAD, awake and alert.  LLE  Dressing clean and dry  +EHL FHL TA GS  SILT L2-S1  +DP  Calf soft and nontender, compartments soft and compressible.      A/P:  77yF s/p L Hip IMN POD #5    -Pain control prn  - DVT ppx  - WBAT/PT/OOB as tolerated   - FU am labs  - Med mgmt, continue home meds.  -1U PRBC 12/21, 12/23  -Ortho stable for d/c when medically stable  -Ortho to [facility]  -FU in the office with the orthopedic attending POD 14 for wound check and staple removal

## 2022-12-25 NOTE — PROGRESS NOTE ADULT - SUBJECTIVE AND OBJECTIVE BOX
c/c: Fall/left hip pain    HPI:  77F, pmh of chronic back pain VCFs s/p Kyphoplasty,  LLE DVT on coumadin,, GERD, anxiety, osteoporosis, and hepatic cysts presents the ED from [facility] after unwitnessed fall.  As per triage note patient was reaching for heater while sitting on chair fell over onto left side.  In ED, stated she did not pass out. She was unsure why she fell but states she was not able to get up off the floor.  States she has pain in her left hip left side of back.  Denies chest pain shortness of breath nausea vomiting headache neck pain.  Trauma alert was called upon arrival.  does endorse multiple falls in the past year.    In ED, noted to have left intertrochanteric hip fracture and admitted.   states she developed LLE DVT after spine surgery. Initially was on xarelto. Per review of records, was readmitted a month later and found to have DVT LLE but it was unclear if this was worse/same or better than initial DVT a month earlier. She was bridged to coumadin and discharged.   She denies h/o stroke/mi. No sob/chest pain. No f/c/r. no cough/sputum. no dysuria.     Now s/p ORIF 12/20. transfused for anemia related to surgery/post op hematoma. required feng for retention    pt seen and examined this am. c/o constipation. Left hip pain controlled. no sob/chest pain. tolerating po.       Review of system- All 10 systems reviewed and is as per HPI otherwise negative.     Vital Signs Last 24 Hrs  T(C): 36.7 (25 Dec 2022 08:47), Max: 37.2 (25 Dec 2022 00:07)  T(F): 98 (25 Dec 2022 08:47), Max: 98.9 (25 Dec 2022 00:07)  HR: 66 (25 Dec 2022 08:47) (66 - 90)  BP: 125/61 (25 Dec 2022 08:47) (113/46 - 127/58)  RR: 16 (25 Dec 2022 08:47) (16 - 17)  SpO2: 95% (25 Dec 2022 08:47) (94% - 96%)    Parameters below as of 25 Dec 2022 08:47  Patient On (Oxygen Delivery Method): room air            PHYSICAL EXAM:  GENERAL: Comfortable, no acute distress  HEAD:  Atraumatic, Normocephalic  EYES: EOMI, PERRLA  HEENT: Moist mucous membranes  NECK: Supple, No JVD  NERVOUS SYSTEM:  Alert & Oriented X3, Motor Strength 5/5 B/L upper and lower extremities  CHEST/LUNG: Clear to auscultation bilaterally  HEART: regular rate and rhythm.   ABDOMEN: Soft, Nontender, Nondistended; Bowel sounds present  GENITOURINARY- feng  EXTREMITIES:  No clubbing, cyanosis, edema  MUSCULOSKELETAL- LLE dressing intact. +underlying hematoma.   SKIN-no rash    LABS:                        9.5    7.13  )-----------( 258      ( 25 Dec 2022 07:52 )             28.6     12-25    144  |  112<H>  |  16  ----------------------------<  103<H>  3.6   |  28  |  0.39<L>    Ca    8.3<L>      25 Dec 2022 07:52      PT/INR - ( 25 Dec 2022 07:52 )   PT: 23.4 sec;   INR: 2.00 ratio           US Duplex Venous Lower Ext Complete, Bilateral (12.18.22 @ 13:10) >  IMPRESSION:  Persistent DEEP venous thrombosis involving the LEFT femoral, popliteal,   and gastrocnemius veins. This was identified previously on January 29, 2020.    MEDICATIONS  (STANDING):  acetaminophen     Tablet .. 975 milliGRAM(s) Oral every 8 hours  benzocaine 15 mG/menthol 3.6 mG Lozenge 1 Lozenge Oral at bedtime  clonazePAM Oral Disintegrating Tablet 0.25 milliGRAM(s) Oral two times a day  escitalopram 10 milliGRAM(s) Oral daily  gabapentin 300 milliGRAM(s) Oral at bedtime  heparin   Injectable 5000 Unit(s) SubCutaneous every 8 hours  lidocaine   4% Patch 1 Patch Transdermal daily  melatonin 3 milliGRAM(s) Oral at bedtime  multivitamin 1 Tablet(s) Oral daily  pantoprazole    Tablet 40 milliGRAM(s) Oral before breakfast  polyethylene glycol 3350 17 Gram(s) Oral at bedtime  potassium chloride    Tablet ER 40 milliEquivalent(s) Oral every 4 hours  senna 2 Tablet(s) Oral at bedtime  warfarin 5 milliGRAM(s) Oral daily    MEDICATIONS  (PRN):  acetaminophen     Tablet .. 650 milliGRAM(s) Oral every 6 hours PRN Temp greater or equal to 38C (100.4F), Mild Pain (1 - 3)  magnesium hydroxide Suspension 30 milliLiter(s) Oral daily PRN Constipation  ondansetron Injectable 4 milliGRAM(s) IV Push every 6 hours PRN Nausea and/or Vomiting  oxyCODONE    IR 5 milliGRAM(s) Oral every 4 hours PRN Moderate Pain (4 - 6)  oxyCODONE    IR 10 milliGRAM(s) Oral every 4 hours PRN Severe Pain (7 - 10)  traMADol 50 milliGRAM(s) Oral every 6 hours PRN Mild Pain (1 - 3)    ASSESSMENT AND PLAN:  77f, PMH as above a/w    1. Left hip fracture:  -s/p ORIF POD#5  -pain control.  -physical therapy post opo  -incentive spirometry  -bowel regimen-->will adjust.     2. Vasovagal episode:  -likely related to hypovolemia, improved with ivf/blood transfusion.     3. Acute blood loss anemia:  -likely related to hematoma at fracture site + surgery  -transfused 1 unit12/21 , 12/23    4. Persistent LLE dvt:  -seen by vascular who felt pt did not have any indication for IVC filter.   -coumadin resumed.     4. Anxiety/depression:  -continue klonipin/escitalopram.     5. OAB/ now in urinary retention  feng placed 12/23  Outpatient voiding trial at rehab once more mobile    6. DVT px:  -inr therapeutic on coumadin.     dispo:  [facility] once bed available.

## 2022-12-26 LAB
ANION GAP SERPL CALC-SCNC: 6 MMOL/L — SIGNIFICANT CHANGE UP (ref 5–17)
BUN SERPL-MCNC: 17 MG/DL — SIGNIFICANT CHANGE UP (ref 7–23)
CALCIUM SERPL-MCNC: 8.6 MG/DL — SIGNIFICANT CHANGE UP (ref 8.5–10.1)
CHLORIDE SERPL-SCNC: 108 MMOL/L — SIGNIFICANT CHANGE UP (ref 96–108)
CO2 SERPL-SCNC: 29 MMOL/L — SIGNIFICANT CHANGE UP (ref 22–31)
CREAT SERPL-MCNC: 0.45 MG/DL — LOW (ref 0.5–1.3)
EGFR: 100 ML/MIN/1.73M2 — SIGNIFICANT CHANGE UP
GLUCOSE SERPL-MCNC: 114 MG/DL — HIGH (ref 70–99)
HCT VFR BLD CALC: 32.6 % — LOW (ref 34.5–45)
HGB BLD-MCNC: 10.9 G/DL — LOW (ref 11.5–15.5)
INR BLD: 2.36 RATIO — HIGH (ref 0.88–1.16)
MCHC RBC-ENTMCNC: 31.1 PG — SIGNIFICANT CHANGE UP (ref 27–34)
MCHC RBC-ENTMCNC: 33.4 GM/DL — SIGNIFICANT CHANGE UP (ref 32–36)
MCV RBC AUTO: 92.9 FL — SIGNIFICANT CHANGE UP (ref 80–100)
PLATELET # BLD AUTO: 303 K/UL — SIGNIFICANT CHANGE UP (ref 150–400)
POTASSIUM SERPL-MCNC: 3.5 MMOL/L — SIGNIFICANT CHANGE UP (ref 3.5–5.3)
POTASSIUM SERPL-SCNC: 3.5 MMOL/L — SIGNIFICANT CHANGE UP (ref 3.5–5.3)
PROTHROM AB SERPL-ACNC: 27.6 SEC — HIGH (ref 10.5–13.4)
RBC # BLD: 3.51 M/UL — LOW (ref 3.8–5.2)
RBC # FLD: 13.8 % — SIGNIFICANT CHANGE UP (ref 10.3–14.5)
SARS-COV-2 RNA SPEC QL NAA+PROBE: SIGNIFICANT CHANGE UP
SODIUM SERPL-SCNC: 143 MMOL/L — SIGNIFICANT CHANGE UP (ref 135–145)
WBC # BLD: 7.9 K/UL — SIGNIFICANT CHANGE UP (ref 3.8–10.5)
WBC # FLD AUTO: 7.9 K/UL — SIGNIFICANT CHANGE UP (ref 3.8–10.5)

## 2022-12-26 PROCEDURE — 99232 SBSQ HOSP IP/OBS MODERATE 35: CPT

## 2022-12-26 RX ORDER — WARFARIN SODIUM 2.5 MG/1
1 TABLET ORAL
Qty: 0 | Refills: 0 | DISCHARGE

## 2022-12-26 RX ADMIN — OXYCODONE HYDROCHLORIDE 10 MILLIGRAM(S): 5 TABLET ORAL at 14:31

## 2022-12-26 RX ADMIN — Medication 975 MILLIGRAM(S): at 13:36

## 2022-12-26 RX ADMIN — Medication 3 MILLIGRAM(S): at 21:08

## 2022-12-26 RX ADMIN — WARFARIN SODIUM 5 MILLIGRAM(S): 2.5 TABLET ORAL at 21:08

## 2022-12-26 RX ADMIN — GABAPENTIN 300 MILLIGRAM(S): 400 CAPSULE ORAL at 21:08

## 2022-12-26 RX ADMIN — SENNA PLUS 2 TABLET(S): 8.6 TABLET ORAL at 21:08

## 2022-12-26 RX ADMIN — Medication 1 TABLET(S): at 09:47

## 2022-12-26 RX ADMIN — Medication 975 MILLIGRAM(S): at 09:49

## 2022-12-26 RX ADMIN — Medication 975 MILLIGRAM(S): at 14:31

## 2022-12-26 RX ADMIN — Medication 975 MILLIGRAM(S): at 09:47

## 2022-12-26 RX ADMIN — ESCITALOPRAM OXALATE 10 MILLIGRAM(S): 10 TABLET, FILM COATED ORAL at 09:46

## 2022-12-26 RX ADMIN — POLYETHYLENE GLYCOL 3350 17 GRAM(S): 17 POWDER, FOR SOLUTION ORAL at 09:47

## 2022-12-26 RX ADMIN — OXYCODONE HYDROCHLORIDE 10 MILLIGRAM(S): 5 TABLET ORAL at 13:37

## 2022-12-26 RX ADMIN — POLYETHYLENE GLYCOL 3350 17 GRAM(S): 17 POWDER, FOR SOLUTION ORAL at 21:08

## 2022-12-26 RX ADMIN — OXYCODONE HYDROCHLORIDE 10 MILLIGRAM(S): 5 TABLET ORAL at 21:08

## 2022-12-26 NOTE — PROGRESS NOTE ADULT - SUBJECTIVE AND OBJECTIVE BOX
c/c: Fall/left hip pain    HPI:  77F, pmh of chronic back pain VCFs s/p Kyphoplasty,  LLE DVT on coumadin,, GERD, anxiety, osteoporosis, and hepatic cysts presents the ED from [facility] after unwitnessed fall.  As per triage note patient was reaching for heater while sitting on chair fell over onto left side.  In ED, stated she did not pass out. She was unsure why she fell but states she was not able to get up off the floor.  States she has pain in her left hip left side of back.  Denies chest pain shortness of breath nausea vomiting headache neck pain.  Trauma alert was called upon arrival.  does endorse multiple falls in the past year.    In ED, noted to have left intertrochanteric hip fracture and admitted.   states she developed LLE DVT after spine surgery. Initially was on xarelto. Per review of records, was readmitted a month later and found to have DVT LLE but it was unclear if this was worse/same or better than initial DVT a month earlier. She was bridged to coumadin and discharged.   She denies h/o stroke/mi. No sob/chest pain. No f/c/r. no cough/sputum. no dysuria.     Now s/p ORIF 12/20. transfused for anemia related to surgery/post op hematoma. required feng for retention    pt seen and examined this am. Doing well. pain controlled. no sob/chest pain. Tolerating po.     Review of system- All 10 systems reviewed and is as per HPI otherwise negative.     Vital Signs Last 24 Hrs  T(C): 36.6 (26 Dec 2022 08:24), Max: 36.7 (25 Dec 2022 20:09)  T(F): 97.9 (26 Dec 2022 08:24), Max: 98.1 (25 Dec 2022 20:09)  HR: 73 (26 Dec 2022 08:24) (70 - 88)  BP: 137/66 (26 Dec 2022 08:24) (117/52 - 137/66)  RR: 18 (26 Dec 2022 08:24) (16 - 18)  SpO2: 95% (26 Dec 2022 08:24) (93% - 95%)    Parameters below as of 26 Dec 2022 08:24  Patient On (Oxygen Delivery Method): room air          PHYSICAL EXAM:  GENERAL: Comfortable, no acute distress  HEAD:  Atraumatic, Normocephalic  EYES: EOMI, PERRLA  HEENT: Moist mucous membranes  NECK: Supple, No JVD  NERVOUS SYSTEM:  Alert & Oriented X3, Motor Strength 5/5 B/L upper and lower extremities  CHEST/LUNG: Clear to auscultation bilaterally  HEART: regular rate and rhythm.   ABDOMEN: Soft, Nontender, Nondistended; Bowel sounds present  GENITOURINARY- fegn  EXTREMITIES:  No clubbing, cyanosis, edema  MUSCULOSKELETAL- LLE dressing intact. +underlying hematoma.   SKIN-no rash    LABS:                        10.9   7.90  )-----------( 303      ( 26 Dec 2022 07:25 )             32.6     12-26    143  |  108  |  17  ----------------------------<  114<H>  3.5   |  29  |  0.45<L>    Ca    8.6      26 Dec 2022 07:25      PT/INR - ( 26 Dec 2022 07:25 )   PT: 27.6 sec;   INR: 2.36 ratio           US Duplex Venous Lower Ext Complete, Bilateral (12.18.22 @ 13:10) >  IMPRESSION:  Persistent DEEP venous thrombosis involving the LEFT femoral, popliteal,   and gastrocnemius veins. This was identified previously on January 29, 2020.    MEDICATIONS  (STANDING):  acetaminophen     Tablet .. 975 milliGRAM(s) Oral every 8 hours  benzocaine 15 mG/menthol 3.6 mG Lozenge 1 Lozenge Oral at bedtime  clonazePAM Oral Disintegrating Tablet 0.25 milliGRAM(s) Oral two times a day  escitalopram 10 milliGRAM(s) Oral daily  gabapentin 300 milliGRAM(s) Oral at bedtime  heparin   Injectable 5000 Unit(s) SubCutaneous every 8 hours  lidocaine   4% Patch 1 Patch Transdermal daily  melatonin 3 milliGRAM(s) Oral at bedtime  multivitamin 1 Tablet(s) Oral daily  pantoprazole    Tablet 40 milliGRAM(s) Oral before breakfast  polyethylene glycol 3350 17 Gram(s) Oral at bedtime  potassium chloride    Tablet ER 40 milliEquivalent(s) Oral every 4 hours  senna 2 Tablet(s) Oral at bedtime  warfarin 5 milliGRAM(s) Oral daily    MEDICATIONS  (PRN):  acetaminophen     Tablet .. 650 milliGRAM(s) Oral every 6 hours PRN Temp greater or equal to 38C (100.4F), Mild Pain (1 - 3)  magnesium hydroxide Suspension 30 milliLiter(s) Oral daily PRN Constipation  ondansetron Injectable 4 milliGRAM(s) IV Push every 6 hours PRN Nausea and/or Vomiting  oxyCODONE    IR 5 milliGRAM(s) Oral every 4 hours PRN Moderate Pain (4 - 6)  oxyCODONE    IR 10 milliGRAM(s) Oral every 4 hours PRN Severe Pain (7 - 10)  traMADol 50 milliGRAM(s) Oral every 6 hours PRN Mild Pain (1 - 3)    ASSESSMENT AND PLAN:  77f, PMH as above a/w    1. Left hip fracture:  -s/p ORIF POD#6  -pain control.  -physical therapy post opo  -incentive spirometry  -bowel regimen    2. Vasovagal episode:  -likely related to hypovolemia, improved with ivf/blood transfusion.     3. Acute blood loss anemia:  -likely related to hematoma at fracture site + surgery  -transfused 1 unit12/21 , 12/23    4. Persistent LLE dvt:  -seen by vascular who felt pt did not have any indication for IVC filter.   -coumadin resumed.     4. Anxiety/depression:  -continue klonipin/escitalopram.     5. OAB/ now in urinary retention  feng placed 12/23  Outpatient voiding trial at rehab once more mobile    6. DVT px:  -inr therapeutic on coumadin.     dispo:  [facility] once bed available.

## 2022-12-26 NOTE — PROGRESS NOTE ADULT - SUBJECTIVE AND OBJECTIVE BOX
Pt seen and examined at the bedside. Pain is well controlled at this time, no concerns at this time.       Vital Signs Last 24 Hrs  T(C): 36.3 (26 Dec 2022 00:01), Max: 36.7 (25 Dec 2022 08:47)  T(F): 97.4 (26 Dec 2022 00:01), Max: 98.1 (25 Dec 2022 20:09)  HR: 70 (26 Dec 2022 00:01) (66 - 88)  BP: 117/52 (26 Dec 2022 00:01) (117/52 - 125/61)  BP(mean): --  RR: 16 (26 Dec 2022 00:01) (16 - 16)  SpO2: 95% (26 Dec 2022 00:01) (93% - 95%)    Parameters below as of 26 Dec 2022 00:01  Patient On (Oxygen Delivery Method): room air                          9.5    7.13  )-----------( 258      ( 25 Dec 2022 07:52 )             28.6       12-25    144  |  112<H>  |  16  ----------------------------<  103<H>  3.6   |  28  |  0.39<L>    Ca    8.3<L>      25 Dec 2022 07:52                PT/INR - ( 25 Dec 2022 07:52 )   PT: 23.4 sec;   INR: 2.00 ratio           Exam:  GEN: NAD, awake and alert.  LLE  Dressing clean and dry  +EHL FHL TA GS  SILT L2-S1  +DP  Calf soft and nontender, compartments soft and compressible.      A/P:  77yF s/p L Hip IMN POD #6    -Pain control prn  - DVT ppx  - WBAT/PT/OOB as tolerated   - FU am labs  - Med mgmt, continue home meds.  -1U PRBC 12/21, 12/23  -Ortho stable for d/c when medically stable  -Ortho to [facility]  -FU in the office with the orthopedic attending POD 14 for wound check and staple removal

## 2022-12-27 ENCOUNTER — TRANSCRIPTION ENCOUNTER (OUTPATIENT)
Age: 77
End: 2022-12-27

## 2022-12-27 VITALS
SYSTOLIC BLOOD PRESSURE: 111 MMHG | HEART RATE: 78 BPM | OXYGEN SATURATION: 97 % | TEMPERATURE: 98 F | DIASTOLIC BLOOD PRESSURE: 41 MMHG | RESPIRATION RATE: 16 BRPM

## 2022-12-27 LAB
ANION GAP SERPL CALC-SCNC: 4 MMOL/L — LOW (ref 5–17)
BUN SERPL-MCNC: 19 MG/DL — SIGNIFICANT CHANGE UP (ref 7–23)
CALCIUM SERPL-MCNC: 8.7 MG/DL — SIGNIFICANT CHANGE UP (ref 8.5–10.1)
CHLORIDE SERPL-SCNC: 107 MMOL/L — SIGNIFICANT CHANGE UP (ref 96–108)
CO2 SERPL-SCNC: 31 MMOL/L — SIGNIFICANT CHANGE UP (ref 22–31)
CREAT SERPL-MCNC: 0.44 MG/DL — LOW (ref 0.5–1.3)
EGFR: 100 ML/MIN/1.73M2 — SIGNIFICANT CHANGE UP
GLUCOSE SERPL-MCNC: 114 MG/DL — HIGH (ref 70–99)
HCT VFR BLD CALC: 31.6 % — LOW (ref 34.5–45)
HGB BLD-MCNC: 10.4 G/DL — LOW (ref 11.5–15.5)
INR BLD: 2.66 RATIO — HIGH (ref 0.88–1.16)
MCHC RBC-ENTMCNC: 31 PG — SIGNIFICANT CHANGE UP (ref 27–34)
MCHC RBC-ENTMCNC: 32.9 GM/DL — SIGNIFICANT CHANGE UP (ref 32–36)
MCV RBC AUTO: 94 FL — SIGNIFICANT CHANGE UP (ref 80–100)
PLATELET # BLD AUTO: 326 K/UL — SIGNIFICANT CHANGE UP (ref 150–400)
POTASSIUM SERPL-MCNC: 3.7 MMOL/L — SIGNIFICANT CHANGE UP (ref 3.5–5.3)
POTASSIUM SERPL-SCNC: 3.7 MMOL/L — SIGNIFICANT CHANGE UP (ref 3.5–5.3)
PROTHROM AB SERPL-ACNC: 31.2 SEC — HIGH (ref 10.5–13.4)
RBC # BLD: 3.36 M/UL — LOW (ref 3.8–5.2)
RBC # FLD: 13.8 % — SIGNIFICANT CHANGE UP (ref 10.3–14.5)
SODIUM SERPL-SCNC: 142 MMOL/L — SIGNIFICANT CHANGE UP (ref 135–145)
WBC # BLD: 9.43 K/UL — SIGNIFICANT CHANGE UP (ref 3.8–10.5)
WBC # FLD AUTO: 9.43 K/UL — SIGNIFICANT CHANGE UP (ref 3.8–10.5)

## 2022-12-27 PROCEDURE — 99231 SBSQ HOSP IP/OBS SF/LOW 25: CPT

## 2022-12-27 PROCEDURE — 99239 HOSP IP/OBS DSCHRG MGMT >30: CPT

## 2022-12-27 RX ORDER — WARFARIN SODIUM 2.5 MG/1
5 TABLET ORAL DAILY
Refills: 0 | Status: DISCONTINUED | OUTPATIENT
Start: 2022-12-27 | End: 2022-12-27

## 2022-12-27 RX ADMIN — OXYCODONE HYDROCHLORIDE 10 MILLIGRAM(S): 5 TABLET ORAL at 02:30

## 2022-12-27 RX ADMIN — Medication 1 TABLET(S): at 10:38

## 2022-12-27 RX ADMIN — Medication 650 MILLIGRAM(S): at 06:02

## 2022-12-27 RX ADMIN — LIDOCAINE 1 PATCH: 4 CREAM TOPICAL at 10:39

## 2022-12-27 RX ADMIN — PANTOPRAZOLE SODIUM 40 MILLIGRAM(S): 20 TABLET, DELAYED RELEASE ORAL at 06:03

## 2022-12-27 RX ADMIN — OXYCODONE HYDROCHLORIDE 10 MILLIGRAM(S): 5 TABLET ORAL at 00:53

## 2022-12-27 RX ADMIN — OXYCODONE HYDROCHLORIDE 10 MILLIGRAM(S): 5 TABLET ORAL at 03:47

## 2022-12-27 RX ADMIN — ESCITALOPRAM OXALATE 10 MILLIGRAM(S): 10 TABLET, FILM COATED ORAL at 10:38

## 2022-12-27 NOTE — PROGRESS NOTE ADULT - SUBJECTIVE AND OBJECTIVE BOX
Pt seen and examined at the bedside. Pain is well controlled at this time, no concerns at this time.         LABS:                        10.9   7.90  )-----------( 303      ( 26 Dec 2022 07:25 )             32.6     12-26    143  |  108  |  17  ----------------------------<  114<H>  3.5   |  29  |  0.45<L>    Ca    8.6      26 Dec 2022 07:25      PT/INR - ( 26 Dec 2022 07:25 )   PT: 27.6 sec;   INR: 2.36 ratio               VITAL SIGNS:  T(C): 36.7 (12-26-22 @ 21:03), Max: 36.7 (12-26-22 @ 21:03)  HR: 84 (12-26-22 @ 21:03) (73 - 84)  BP: 123/54 (12-26-22 @ 21:03) (61/38 - 137/66)  RR: 18 (12-26-22 @ 21:03) (18 - 18)  SpO2: 100% (12-26-22 @ 21:03) (95% - 100%)     Exam:  GEN: NAD, awake and alert.  LLE  Dressing clean and dry  +EHL FHL TA GS  SILT L2-S1  +DP  Calf soft and nontender, compartments soft and compressible.      A/P:  77yF s/p L Hip IMN POD #7    -Pain control prn  - DVT ppx  - WBAT/PT/OOB as tolerated   - FU am labs  - Med mgmt, continue home meds.  -1U PRBC 12/21, 12/23  -Ortho stable for d/c when medically stable  -No further acute orthopaedic surgical intervention indicated  -FU in the office with the orthopedic attending POD 14 for wound check and staple removal

## 2022-12-27 NOTE — PROGRESS NOTE ADULT - PROVIDER SPECIALTY LIST ADULT
Anticoag Management
Hospitalist
Orthopedics
Orthopedics
Hospitalist
Hospitalist
Orthopedics
Anticoag Management
Hospitalist
Orthopedics
Orthopedics

## 2022-12-27 NOTE — PROGRESS NOTE ADULT - SUBJECTIVE AND OBJECTIVE BOX
HPI:  77-year-old female with h/o  chronic back pain VCFs s/p Kyphoplasty, GERD, anxiety, osteoporosis, and hepatic cysts, LEFT LE DVT on coumadin,  presents the ED from [facility] after unwitnessed fall.  As per triage note patient was reaching for heater while sitting on chair fell over onto left side.  Here patient states that she did not syncopized she is unsure why she fell but states she was not able to get up off the floor.  States she has pain in her left hip left side of back.  Denies chest pain shortness of breath nausea vomiting headache neck pain.  Trauma alert was called upon arrival.  does endorses multiple falls in the past year.      In ED, noted to have left intertrochanteric hip fracture.  [id number] (18 Dec 2022 13:12)      Patient is a 77y old  Female who presents with a chief complaint of fall (20 Dec 2022 08:25)      Consulted by Dr. Teja Ponce  for VTE prophylaxis, risk stratification, and anticoagulation management.    PAST MEDICAL & SURGICAL HISTORY:  Anxiety  left le DVT on coumadin    Chronic GERD      Osteoporosis      Hepatic cyst      Status post lumbar spine operation          FAMILY HISTORY:  FH: diabetes mellitus    FHx: leukemia  mother, age 50s        Interval Note:  22: Pt seen at bedside [id number]. Pt knows her name and that her hip hurts.  Do not remember falling.  Pt made aware she will need prophylactic Anticoagulation post procedure.  Will need more reinforcement.  Pt states she takes coumadin.  Pt made aware we will restart her coumadin when stable post procedure.   2022 Pt seen at bedside on [facility].  Discussed her resuming her coumadin overlapping with heparin.  Pt s/p left hip IMN  on 2022.    2022 Pt seen at bedside on [facility].  Discussed her INR  and coumadin dosing.  Pt for rehab on discharge.   2022 Pt seen at bedside on [facility].  Discussed with pt the INR  of 1.38 today and increasing her dose to 7.5mg today. Cont on her Heparin sq. Pt will go to rehab on discharge.   2022 Pt seen at bedside on [facility]. No overnight events, Discussed  pt the INR and the coumadin dosage no concerns possible rehabt today     CAPRINI SCORE  AGE RELATED RISK FACTORS                                                       MOBILITY RELATED FACTORS  [ ] Age 41-60 years                                            (1 Point)                  [ ] Bed rest                                                        (1 Point)  [ ] Age: 61-74 years                                           (2 Points)                [ ] Plaster cast                                                   (2 Points)  [x ] Age= 75 years                                              (3 Points)                 [ ] Bed bound for more than 72 hours                   (2 Points)    DISEASE RELATED RISK FACTORS                                               GENDER SPECIFIC FACTORS  [ ] Edema in the lower extremities                       (1 Point)           [ ] Pregnancy                                                            (1 Point)  [ ] Varicose veins                                               (1 Point)                  [ ] Post-partum < 6 weeks                                      (1 Point)             [ ] BMI > 25 Kg/m2                                            (1 Point)                  [ ] Hormonal therapy or oral contraception       (1 Point)                 [ ] Sepsis (in the previous month)                        (1 Point)             [ ] History of pregnancy complications                (1Point)  [ ] Pneumonia or serious lung disease                                             [ ] Unexplained or recurrent  (=/>3), premature                                 (In the previous month)                               (1 Point)                birth with toxemia or growth-restricted infant (1 Point)  [ ] Abnormal pulmonary function test            (1 Point)                                   SURGERY RELATED RISK FACTORS  [ ] Acute myocardial infarction                       (1 Point)                  [ ]  Section                                         (1 Point)  [ ] Congestive heart failure (in the previous month) (1 Point)   [ ] Minor surgery   lasting <45 minutes       (1 Point)   [ ] Inflammatory bowel disease                             (1 Point)          [ ] Arthroscopic surgery                                  (2 Points)  [ ] Central venous access                                    (2 Points)            [ ] General surgery lasting >45 minutes      (2 Points)       [ ] Stroke (in the previous month)                  (5 Points)            [ ] Elective major lower extremity arthroplasty (5 Points)                                   [  ] Malignancy (present or past include skin melanoma                                          but exclude  basal skin cell)    (2 points)                                      TRAUMA RELATED RISK FACTORS                HEMATOLOGY RELATED FACTORS                                  [x ] Fracture of the hip, pelvis, or leg                       (5 Points)  [x ] Prior episodes of VTE                                     (3 Points)          [ ] Acute spinal cord injury (in the previous month)  (5 Points)  [ ] Positive family history for VTE                         (3 Points)       [ ] Paralysis (less than 1 month)                          (5 Points)  [ ] Prothrombin 39268 A                                      (3 Points)         [ ] Multiple Trauma (within 1month)                 (5Points)                                                                                                                                                                [ ] Factor V Leiden                                          (3 Points)                                OTHER RISK FACTORS                          [ ] Lupus anticoagulants                                     (3 Points)                       [ ] BMI > 40                          (1 Point)                                                         [ ] Anticardiolipin antibodies                                (3 Points)                   [ ] Smoking                              (1Point)                                                [ ] High homocysteine in the blood                      (3 Points)                [  ] Diabetes requiring insulin (1point)                         [ ] Other congenital or acquired thrombophilia       (3 Points)          [  ] Chemotherapy                   (1 Point)  [ ] Heparin induced thrombocytopenia                  (3 Points)             [  ] Blood Transfusion                (1 point)                                                                                                             Total Score [ 11 ]                                                                                                                                                                                                                                                                                                                                                                                                                                         IMPROVE Bleeding Risk Score: 1.5    Falls Risk:   High (x  )  Mod (  )  Low (  )  crcl: 72        cr: .60         BMI: 21.1            EBL:  50   ml        Denies any personal or familial history of clotting or bleeding disorders.    Allergies    No Known Drug Allergies  shellfish (Unknown)    Intolerances        REVIEW OF SYSTEMS    (  )Fever	     (  )Constipation	(  )SOB				(  )Headache	(  )Dysuria  (  )Chills	     (  )Melena	(  )Dyspnea present on exertion	                    (  )Dizziness                    (  )Polyuria  (  )Nausea	     (  )Hematochezia	(  )Cough			                    (  )Syncope   	(  )Hematuria  (  )Vomiting    (  )Chest Pain	(  )Wheezing			(  )Weakness  (x) hip pain  (  )Diarrhea     (  )Palpitations	(  )Anorexia			(  )Myalgia    Pertinent positives in HPI and daily subjective.  All other ROS negative.  Vital Signs Last 24 Hrs  T(C): 36.7 (27 Dec 2022 08:55), Max: 36.7 (26 Dec 2022 21:03)  T(F): 98 (27 Dec 2022 08:55), Max: 98.1 (26 Dec 2022 21:03)  HR: 78 (27 Dec 2022 08:55) (78 - 86)  BP: 111/41 (27 Dec 2022 08:55) (61/38 - 123/54)  BP(mean): 68 (26 Dec 2022 21:03) (68 - 68)  RR: 16 (27 Dec 2022 08:55) (16 - 18)  SpO2: 97% (27 Dec 2022 08:55) (97% - 100%)  PHYSICAL EXAM:    Constitutional: Appears Well    Neurological: A& O x 2 person and place    Skin: Warm    Respiratory and Thorax: normal effort; Breath sounds: normal; No rales/wheezing/rhonchi  	  Cardiovascular: S1, S2, regular, NMBR	    Gastrointestinal: BS + x 4Q, nontender	    Genitourinary:  Bladder nondistended, nontender    Musculoskeletal:   General Right:   no muscle/joint tenderness,   normal tone, no joint swelling,   ROM: full	    General Left:   no muscle/joint tenderness,   normal tone, no joint swelling,   ROM: limited    Hip:  left  dsg CDI     Lower extrems:   Right: no calf tenderness              negative jane's sign               + pedal pulses    Left:   no calf tenderness              negative jane's sign               + pedal pulses                        10.4   9.43  )-----------( 326      ( 27 Dec 2022 08:23 )             31.6       12    142  |  107  |  19  ----------------------------<  114<H>  3.7   |  31  |  0.44<L>    Ca    8.7      27 Dec 2022 08:23        PT/INR - ( 27 Dec 2022 08:23 )   PT: 31.2 sec;   INR: 2.66 ratio                                            7.7    7.37  )-----------( 192      ( 23 Dec 2022 10:23 ) one unit of PRBC             23.1       12-    145  |  117<H>  |  21  ----------------------------<  114<H>  3.7   |  23  |  1.30    Ca    7.4<L>      22 Dec 2022 09:00  Phos  2.4     12-22  Mg     2.2     12-                              8.8    10.74 )-----------( 179      ( 22 Dec 2022 09:00 )             26.9       12-    145  |  117<H>  |  21  ----------------------------<  114<H>  3.7   |  23  |  1.30    Ca    7.4<L>      22 Dec 2022 09:00  Phos  2.4     12-22  Mg     2.2     12-                    8.0    10.36 )-----------( 191      ( 21 Dec 2022 08:35 )  one unit of prbc             25.0       12-    145  |  113<H>  |  16  ----------------------------<  142<H>  3.7   |  24  |  0.72    Ca    7.8<L>      21 Dec 2022 08:35    TPro  x   /  Alb  2.5<L>  /  TBili  x   /  DBili  x   /  AST  x   /  ALT  x   /  AlkPhos  x          PTT - ( 20 Dec 2022 04:35 )  PTT:32.6 sec             9.0    9.07  )-----------( 178      ( 20 Dec 2022 04:35 )             27.3           142  |  108  |  15  ----------------------------<  129<H>  3.7   |  27  |  0.60    Ca    8.0<L>      20 Dec 2022 04:35    TPro  x   /  Alb  2.5<L>  /  TBili  x   /  DBili  x   /  AST  x   /  ALT  x   /  AlkPhos  x     PT/INR - ( 23 Dec 2022 09:02 )   PT: 16.1 sec;   INR: 1.38 ratio    PT/INR - ( 22 Dec 2022 09:00 )   PT: 14.6 sec;   INR: 1.26 ratio    PT/INR - ( 21 Dec 2022 08:35 )   PT: 13.7 sec;   INR: 1.18 ratio    PT/INR - ( 20 Dec 2022 04:35 )   PT: 23.4 sec;   INR: 2.00 ratio VIT K 2.5MG         PTT - ( 20 Dec 2022 04:35 )  PTT:32.6 sec				  < from: Xray Femur 2 Views, Left (1218.22 @ 09:00) >  IMPRESSION:    Displaced left intertrochanteric hip fracture with angulated   foreshortening.    < end of copied text >  MEDICATIONS  (STANDING):  acetaminophen     Tablet .. 975 milliGRAM(s) Oral every 8 hours  benzocaine 15 mG/menthol 3.6 mG Lozenge 1 Lozenge Oral at bedtime  escitalopram 10 milliGRAM(s) Oral daily  gabapentin 300 milliGRAM(s) Oral at bedtime  lidocaine   4% Patch 1 Patch Transdermal daily  melatonin 3 milliGRAM(s) Oral at bedtime  multivitamin 1 Tablet(s) Oral daily  pantoprazole    Tablet 40 milliGRAM(s) Oral before breakfast  polyethylene glycol 3350 17 Gram(s) Oral at bedtime  polyethylene glycol 3350 17 Gram(s) Oral daily  senna 2 Tablet(s) Oral at bedtime  warfarin 5 milliGRAM(s) Oral daily            DVT Prophylaxis:  LMWH                   (  )  Heparin SQ           (X  )  Coumadin             ( X )  Xarelto                  (  )  Eliquis                   (  )  Venodynes           ( x )  Ambulation          (X  )  UFH                       (  )  Contraindicated  (  )  EC Aspirin             (  )

## 2022-12-27 NOTE — DISCHARGE NOTE NURSING/CASE MANAGEMENT/SOCIAL WORK - PATIENT PORTAL LINK FT
You can access the FollowMyHealth Patient Portal offered by [facility] by registering at the following website: [email or web address]. By joining [facility]’s FollowMyHealth portal, you will also be able to view your health information using other applications (apps) compatible with our system.

## 2022-12-27 NOTE — PROGRESS NOTE ADULT - ASSESSMENT
This is a 77 year old female with pmh of Left LE dvt  on coumadin,   chronic back pain VCFs s/p Kyphoplasty, GERD, anxiety, osteoporosis, and hepatic cysts, presented to ED  for mechanical fall.  Pt foune to have a fracture of her left Displaced left intertrochanteric hip fracture.  Pt awaiting orthopedic surgical intervention.  Pt has high thrombotic risk and will need prophylactic anticoagulation post procedure.  will resume coumadin when stable.      plan:   Continue Coumadin 5mg PO daily and recheck INR in 2 days   le venodynes  daily cbc/bmp, pt/inr.   increase mobility as per ortho.    will fu  dispo rehab Today
This is a 77 year old female with pmh of Left LE dvt  on coumadin,   chronic back pain VCFs s/p Kyphoplasty, GERD, anxiety, osteoporosis, and hepatic cysts, presented to ED  for mechanical fall.  Pt foune to have a fracture of her left Displaced left intertrochanteric hip fracture.  Pt awaiting orthopedic surgical intervention.  Pt has high thrombotic risk and will need prophylactic anticoagulation post procedure.  will resume coumadin when stable.      plan:   INCREASE coumadin to 7. 5mg potoday adjust with INR  Heparin 5,000 units sq q8h until INR is 2.0 or greater.    le venodynes  daily cbc/bmp, pt/inr. noted decrease in h/h 7.7/23.1 will receive one unit of PRBC today.  increase mobility as per ortho.    will fu  dispo rehab
This is a 77 year old female with pmh of Left LE dvt  on coumadin,   chronic back pain VCFs s/p Kyphoplasty, GERD, anxiety, osteoporosis, and hepatic cysts, presented to ED  for mechanical fall.  Pt foune to have a fracture of her left Displaced left intertrochanteric hip fracture.  Pt awaiting orthopedic surgical intervention.  Pt has high thrombotic risk and will need prophylactic anticoagulation post procedure.  will resume coumadin when stable.      plan:   Resume coumadin 5mg po daily adjust with INR  Heparin 5,000 units sq q8h until INR is 2.0 or greater.    le venodynes  daily cbc/bmp, pt/inr.  bedrest   will fu
This is a 77 year old female with pmh of Left LE dvt  on coumadin,   chronic back pain VCFs s/p Kyphoplasty, GERD, anxiety, osteoporosis, and hepatic cysts, presented to ED  for mechanical fall.  Pt foune to have a fracture of her left Displaced left intertrochanteric hip fracture.  Pt awaiting orthopedic surgical intervention.  Pt has high thrombotic risk and will need prophylactic anticoagulation post procedure.  will resume coumadin when stable.      plan:   cont coumadin 5mg po daily adjust with INR  Heparin 5,000 units sq q8h until INR is 2.0 or greater.    le venodynes  daily cbc/bmp, pt/inr. noteed increase in cr .72--->1.3 today and decrease in gfr 87--->43 today  increase moblity as per ortho.    will fu

## 2022-12-27 NOTE — PROGRESS NOTE ADULT - SUBJECTIVE AND OBJECTIVE BOX
c/c: Fall/left hip pain    HPI:  77F, pmh of chronic back pain VCFs s/p Kyphoplasty,  LLE DVT on coumadin,, GERD, anxiety, osteoporosis, and hepatic cysts presents the ED from [facility] after unwitnessed fall.  As per triage note patient was reaching for heater while sitting on chair fell over onto left side.  In ED, stated she did not pass out. She was unsure why she fell but states she was not able to get up off the floor.  States she has pain in her left hip left side of back.  Denies chest pain shortness of breath nausea vomiting headache neck pain.  Trauma alert was called upon arrival.  does endorse multiple falls in the past year.    In ED, noted to have left intertrochanteric hip fracture and admitted.   states she developed LLE DVT after spine surgery. Initially was on xarelto. Per review of records, was readmitted a month later and found to have DVT LLE but it was unclear if this was worse/same or better than initial DVT a month earlier. She was bridged to coumadin and discharged.   She denies h/o stroke/mi. No sob/chest pain. No f/c/r. no cough/sputum. no dysuria.     Now s/p ORIF 12/20. transfused for anemia related to surgery/post op hematoma. required feng for retention    12/27/22- denies any acute complaints, going to rehab today    Review of system- All 10 systems reviewed and is as per HPI otherwise negative.     Vital Signs Last 24 Hrs  T(C): 36.7 (27 Dec 2022 08:55), Max: 36.7 (26 Dec 2022 21:03)  T(F): 98 (27 Dec 2022 08:55), Max: 98.1 (26 Dec 2022 21:03)  HR: 78 (27 Dec 2022 08:55) (78 - 86)  BP: 111/41 (27 Dec 2022 08:55) (61/38 - 123/54)  BP(mean): 68 (26 Dec 2022 21:03) (68 - 68)  RR: 16 (27 Dec 2022 08:55) (16 - 18)  SpO2: 97% (27 Dec 2022 08:55) (97% - 100%)    Parameters below as of 27 Dec 2022 08:55  Patient On (Oxygen Delivery Method): room air    PHYSICAL EXAM:  GENERAL: Comfortable, no acute distress  HEAD:  Atraumatic, Normocephalic  EYES: EOMI, PERRLA  HEENT: Moist mucous membranes  NECK: Supple, No JVD  NERVOUS SYSTEM:  Alert & Oriented X3, Motor Strength 5/5 B/L upper and lower extremities  CHEST/LUNG: Clear to auscultation bilaterally  HEART: regular rate and rhythm.   ABDOMEN: Soft, Nontender, Nondistended; Bowel sounds present  GENITOURINARY- feng  EXTREMITIES:  No clubbing, cyanosis, edema  MUSCULOSKELETAL- LLE dressing intact. +underlying hematoma.   SKIN-no rash    LABS:                        10.4   9.43  )-----------( 326      ( 27 Dec 2022 08:23 )             31.6     27 Dec 2022 08:23    142    |  107    |  19     ----------------------------<  114    3.7     |  31     |  0.44     Ca    8.7        27 Dec 2022 08:23    PT/INR - ( 27 Dec 2022 08:23 )   PT: 31.2 sec;   INR: 2.66 ratio       US Duplex Venous Lower Ext Complete, Bilateral (12.18.22 @ 13:10) >  IMPRESSION:  Persistent DEEP venous thrombosis involving the LEFT femoral, popliteal,   and gastrocnemius veins. This was identified previously on January 29, 2020.    MEDICATIONS  (STANDING):  acetaminophen     Tablet .. 975 milliGRAM(s) Oral every 8 hours  benzocaine 15 mG/menthol 3.6 mG Lozenge 1 Lozenge Oral at bedtime  clonazePAM Oral Disintegrating Tablet 0.25 milliGRAM(s) Oral two times a day  escitalopram 10 milliGRAM(s) Oral daily  gabapentin 300 milliGRAM(s) Oral at bedtime  heparin   Injectable 5000 Unit(s) SubCutaneous every 8 hours  lidocaine   4% Patch 1 Patch Transdermal daily  melatonin 3 milliGRAM(s) Oral at bedtime  multivitamin 1 Tablet(s) Oral daily  pantoprazole    Tablet 40 milliGRAM(s) Oral before breakfast  polyethylene glycol 3350 17 Gram(s) Oral at bedtime  potassium chloride    Tablet ER 40 milliEquivalent(s) Oral every 4 hours  senna 2 Tablet(s) Oral at bedtime  warfarin 5 milliGRAM(s) Oral daily    MEDICATIONS  (PRN):  acetaminophen     Tablet .. 650 milliGRAM(s) Oral every 6 hours PRN Temp greater or equal to 38C (100.4F), Mild Pain (1 - 3)  magnesium hydroxide Suspension 30 milliLiter(s) Oral daily PRN Constipation  ondansetron Injectable 4 milliGRAM(s) IV Push every 6 hours PRN Nausea and/or Vomiting  oxyCODONE    IR 5 milliGRAM(s) Oral every 4 hours PRN Moderate Pain (4 - 6)  oxyCODONE    IR 10 milliGRAM(s) Oral every 4 hours PRN Severe Pain (7 - 10)  traMADol 50 milliGRAM(s) Oral every 6 hours PRN Mild Pain (1 - 3)    ASSESSMENT AND PLAN:  77f, PMH as above a/w    1. Left hip fracture:  -s/p ORIF   -pain control.  -physical therapy post opo  -incentive spirometry  -bowel regimen    2. Vasovagal episode:  -likely related to hypovolemia, improved with ivf/blood transfusion.     3. Acute blood loss anemia:  -likely related to hematoma at fracture site + surgery  -transfused 1 unit12/21 , 12/23    4. Persistent LLE dvt:  -seen by vascular who felt pt did not have any indication for IVC filter.   -coumadin resumed.     4. Anxiety/depression:  -continue klonipin/escitalopram.     5. OAB/ now in urinary retention  feng placed 12/23  Outpatient voiding trial at rehab once more mobile    6. DVT px:  -inr therapeutic on coumadin.     dispo:  [facility] today. DC time 45 mins
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
## 2023-01-01 DIAGNOSIS — Z83.3 FAMILY HISTORY OF DIABETES MELLITUS: ICD-10-CM

## 2023-01-01 DIAGNOSIS — E86.1 HYPOVOLEMIA: ICD-10-CM

## 2023-01-01 DIAGNOSIS — I82.532 CHRONIC EMBOLISM AND THROMBOSIS OF LEFT POPLITEAL VEIN: ICD-10-CM

## 2023-01-01 DIAGNOSIS — I82.512 CHRONIC EMBOLISM AND THROMBOSIS OF LEFT FEMORAL VEIN: ICD-10-CM

## 2023-01-01 DIAGNOSIS — S72.142A DISPLACED INTERTROCHANTERIC FRACTURE OF LEFT FEMUR, INITIAL ENCOUNTER FOR CLOSED FRACTURE: ICD-10-CM

## 2023-01-01 DIAGNOSIS — F41.9 ANXIETY DISORDER, UNSPECIFIED: ICD-10-CM

## 2023-01-01 DIAGNOSIS — R33.9 RETENTION OF URINE, UNSPECIFIED: ICD-10-CM

## 2023-01-01 DIAGNOSIS — S32.10XA UNSPECIFIED FRACTURE OF SACRUM, INITIAL ENCOUNTER FOR CLOSED FRACTURE: ICD-10-CM

## 2023-01-01 DIAGNOSIS — Z80.6 FAMILY HISTORY OF LEUKEMIA: ICD-10-CM

## 2023-01-01 DIAGNOSIS — S22.089A UNSPECIFIED FRACTURE OF T11-T12 VERTEBRA, INITIAL ENCOUNTER FOR CLOSED FRACTURE: ICD-10-CM

## 2023-01-01 DIAGNOSIS — R55 SYNCOPE AND COLLAPSE: ICD-10-CM

## 2023-01-01 DIAGNOSIS — M81.0 AGE-RELATED OSTEOPOROSIS WITHOUT CURRENT PATHOLOGICAL FRACTURE: ICD-10-CM

## 2023-01-01 DIAGNOSIS — F32.A DEPRESSION, UNSPECIFIED: ICD-10-CM

## 2023-01-01 DIAGNOSIS — Z79.01 LONG TERM (CURRENT) USE OF ANTICOAGULANTS: ICD-10-CM

## 2023-01-01 DIAGNOSIS — K76.89 OTHER SPECIFIED DISEASES OF LIVER: ICD-10-CM

## 2023-01-01 DIAGNOSIS — K21.9 GASTRO-ESOPHAGEAL REFLUX DISEASE WITHOUT ESOPHAGITIS: ICD-10-CM

## 2023-01-01 DIAGNOSIS — D62 ACUTE POSTHEMORRHAGIC ANEMIA: ICD-10-CM

## 2023-01-01 DIAGNOSIS — Z91.013 ALLERGY TO SEAFOOD: ICD-10-CM
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
## 2023-01-01 DIAGNOSIS — I82.562 CHRONIC EMBOLISM AND THROMBOSIS OF LEFT CALF MUSCULAR VEIN: ICD-10-CM

## 2023-02-01 ENCOUNTER — APPOINTMENT (OUTPATIENT)
Dept: ORTHOPEDIC SURGERY | Facility: CLINIC | Age: 78
End: 2023-02-01

## 2023-05-16 NOTE — PHYSICAL THERAPY INITIAL EVALUATION ADULT - TRANSFER SKILLS, REHAB EVAL
Medicare Annual Wellness Visit    Gayatri George is here for Medicare AWV    Assessment & Plan   Welcome to Medicare preventive visit  Hypothyroidism due to Hashimoto's thyroiditis euthyroid  -     levothyroxine (SYNTHROID) 75 MCG tablet; TAKE 1 TABLET ONCE DAILY   BEFORE BREAKFAST, Disp-90 tablet, R-1Normal  Encounter for immunization  -     Pneumococcal, PCV20, PREVNAR 20, (age 25 yrs+), IM, PF  -     zoster recombinant adjuvanted vaccine (SHINGRIX) 50 MCG/0.5ML SUSR injection; Inject 0.5 mLs into the muscle See Admin Instructions 1 dose now and repeat in 2-6 months, Disp-0.5 mL, R-0Print      Recommendations for Preventive Services Due: see orders and patient instructions/AVS.  Recommended screening schedule for the next 5-10 years is provided to the patient in written form: see Patient Instructions/AVS.     Return in 6 months (on 11/16/2023). Subjective   Hypothyroidism : doing well on levothyroxine 75 mcg every 6 days     Patient's complete Health Risk Assessment and screening values have been reviewed and are found in Flowsheets. The following problems were reviewed today and where indicated follow up appointments were made and/or referrals ordered. Positive Risk Factor Screenings with Interventions:                     Safety:  Do you have either shower bars, grab bars, non-slip mats or non-slip surfaces in your shower or bathtub?: (!) No  Interventions:  Discussed with patient                      Objective   Vitals:    05/16/23 1136   BP: 110/60   Site: Right Upper Arm   Position: Sitting   Cuff Size: Medium Adult   Pulse: 60   Resp: 18   SpO2: 99%   Weight: 111 lb (50.3 kg)   Height: 5' 2\" (1.575 m)      Body mass index is 20.3 kg/m².         General Appearance: alert and oriented to person, place and time, well-developed and well-nourished, in no acute distress  Skin: warm and dry, no rash or erythema  Head: normocephalic and atraumatic  Eyes: pupils equal, round, and reactive to light, extraocular independent

## 2023-11-30 ENCOUNTER — INPATIENT (INPATIENT)
Facility: HOSPITAL | Age: 78
LOS: 3 days | Discharge: NURSING, REHAB OR LONG-TERM CARE | DRG: 177 | End: 2023-12-04
Attending: INTERNAL MEDICINE | Admitting: FAMILY MEDICINE
Payer: MEDICARE

## 2023-11-30 VITALS
SYSTOLIC BLOOD PRESSURE: 102 MMHG | DIASTOLIC BLOOD PRESSURE: 64 MMHG | OXYGEN SATURATION: 85 % | HEART RATE: 84 BPM | RESPIRATION RATE: 22 BRPM | TEMPERATURE: 98 F

## 2023-11-30 DIAGNOSIS — Z98.890 OTHER SPECIFIED POSTPROCEDURAL STATES: Chronic | ICD-10-CM

## 2023-11-30 DIAGNOSIS — J18.9 PNEUMONIA, UNSPECIFIED ORGANISM: ICD-10-CM

## 2023-11-30 LAB
ALBUMIN SERPL ELPH-MCNC: 2.9 G/DL — LOW (ref 3.3–5)
ALBUMIN SERPL ELPH-MCNC: 2.9 G/DL — LOW (ref 3.3–5)
ALP SERPL-CCNC: 73 U/L — SIGNIFICANT CHANGE UP (ref 40–120)
ALP SERPL-CCNC: 73 U/L — SIGNIFICANT CHANGE UP (ref 40–120)
ALT FLD-CCNC: 32 U/L — SIGNIFICANT CHANGE UP (ref 12–78)
ALT FLD-CCNC: 32 U/L — SIGNIFICANT CHANGE UP (ref 12–78)
ANION GAP SERPL CALC-SCNC: 9 MMOL/L — SIGNIFICANT CHANGE UP (ref 5–17)
ANION GAP SERPL CALC-SCNC: 9 MMOL/L — SIGNIFICANT CHANGE UP (ref 5–17)
APPEARANCE UR: CLEAR — SIGNIFICANT CHANGE UP
APPEARANCE UR: CLEAR — SIGNIFICANT CHANGE UP
AST SERPL-CCNC: 44 U/L — HIGH (ref 15–37)
AST SERPL-CCNC: 44 U/L — HIGH (ref 15–37)
BACTERIA # UR AUTO: ABNORMAL /HPF
BACTERIA # UR AUTO: ABNORMAL /HPF
BASOPHILS # BLD AUTO: 0.04 K/UL — SIGNIFICANT CHANGE UP (ref 0–0.2)
BASOPHILS # BLD AUTO: 0.04 K/UL — SIGNIFICANT CHANGE UP (ref 0–0.2)
BASOPHILS NFR BLD AUTO: 0.3 % — SIGNIFICANT CHANGE UP (ref 0–2)
BASOPHILS NFR BLD AUTO: 0.3 % — SIGNIFICANT CHANGE UP (ref 0–2)
BILIRUB SERPL-MCNC: 0.7 MG/DL — SIGNIFICANT CHANGE UP (ref 0.2–1.2)
BILIRUB SERPL-MCNC: 0.7 MG/DL — SIGNIFICANT CHANGE UP (ref 0.2–1.2)
BILIRUB UR-MCNC: NEGATIVE — SIGNIFICANT CHANGE UP
BILIRUB UR-MCNC: NEGATIVE — SIGNIFICANT CHANGE UP
BUN SERPL-MCNC: 23 MG/DL — SIGNIFICANT CHANGE UP (ref 7–23)
BUN SERPL-MCNC: 23 MG/DL — SIGNIFICANT CHANGE UP (ref 7–23)
CALCIUM SERPL-MCNC: 8.7 MG/DL — SIGNIFICANT CHANGE UP (ref 8.5–10.1)
CALCIUM SERPL-MCNC: 8.7 MG/DL — SIGNIFICANT CHANGE UP (ref 8.5–10.1)
CAST: 0 /LPF — SIGNIFICANT CHANGE UP (ref 0–4)
CAST: 0 /LPF — SIGNIFICANT CHANGE UP (ref 0–4)
CHLORIDE SERPL-SCNC: 114 MMOL/L — HIGH (ref 96–108)
CHLORIDE SERPL-SCNC: 114 MMOL/L — HIGH (ref 96–108)
CO2 SERPL-SCNC: 23 MMOL/L — SIGNIFICANT CHANGE UP (ref 22–31)
CO2 SERPL-SCNC: 23 MMOL/L — SIGNIFICANT CHANGE UP (ref 22–31)
COLOR SPEC: SIGNIFICANT CHANGE UP
COLOR SPEC: SIGNIFICANT CHANGE UP
CREAT SERPL-MCNC: 0.99 MG/DL — SIGNIFICANT CHANGE UP (ref 0.5–1.3)
CREAT SERPL-MCNC: 0.99 MG/DL — SIGNIFICANT CHANGE UP (ref 0.5–1.3)
DIFF PNL FLD: ABNORMAL
DIFF PNL FLD: ABNORMAL
EGFR: 59 ML/MIN/1.73M2 — LOW
EGFR: 59 ML/MIN/1.73M2 — LOW
EOSINOPHIL # BLD AUTO: 0.01 K/UL — SIGNIFICANT CHANGE UP (ref 0–0.5)
EOSINOPHIL # BLD AUTO: 0.01 K/UL — SIGNIFICANT CHANGE UP (ref 0–0.5)
EOSINOPHIL NFR BLD AUTO: 0.1 % — SIGNIFICANT CHANGE UP (ref 0–6)
EOSINOPHIL NFR BLD AUTO: 0.1 % — SIGNIFICANT CHANGE UP (ref 0–6)
GLUCOSE SERPL-MCNC: 183 MG/DL — HIGH (ref 70–99)
GLUCOSE SERPL-MCNC: 183 MG/DL — HIGH (ref 70–99)
GLUCOSE UR QL: NEGATIVE MG/DL — SIGNIFICANT CHANGE UP
GLUCOSE UR QL: NEGATIVE MG/DL — SIGNIFICANT CHANGE UP
HCT VFR BLD CALC: 36.9 % — SIGNIFICANT CHANGE UP (ref 34.5–45)
HCT VFR BLD CALC: 36.9 % — SIGNIFICANT CHANGE UP (ref 34.5–45)
HGB BLD-MCNC: 12.3 G/DL — SIGNIFICANT CHANGE UP (ref 11.5–15.5)
HGB BLD-MCNC: 12.3 G/DL — SIGNIFICANT CHANGE UP (ref 11.5–15.5)
IMM GRANULOCYTES NFR BLD AUTO: 0.6 % — SIGNIFICANT CHANGE UP (ref 0–0.9)
IMM GRANULOCYTES NFR BLD AUTO: 0.6 % — SIGNIFICANT CHANGE UP (ref 0–0.9)
KETONES UR-MCNC: NEGATIVE MG/DL — SIGNIFICANT CHANGE UP
KETONES UR-MCNC: NEGATIVE MG/DL — SIGNIFICANT CHANGE UP
LEUKOCYTE ESTERASE UR-ACNC: NEGATIVE — SIGNIFICANT CHANGE UP
LEUKOCYTE ESTERASE UR-ACNC: NEGATIVE — SIGNIFICANT CHANGE UP
LYMPHOCYTES # BLD AUTO: 1.1 K/UL — SIGNIFICANT CHANGE UP (ref 1–3.3)
LYMPHOCYTES # BLD AUTO: 1.1 K/UL — SIGNIFICANT CHANGE UP (ref 1–3.3)
LYMPHOCYTES # BLD AUTO: 8.4 % — LOW (ref 13–44)
LYMPHOCYTES # BLD AUTO: 8.4 % — LOW (ref 13–44)
MCHC RBC-ENTMCNC: 32.1 PG — SIGNIFICANT CHANGE UP (ref 27–34)
MCHC RBC-ENTMCNC: 32.1 PG — SIGNIFICANT CHANGE UP (ref 27–34)
MCHC RBC-ENTMCNC: 33.3 GM/DL — SIGNIFICANT CHANGE UP (ref 32–36)
MCHC RBC-ENTMCNC: 33.3 GM/DL — SIGNIFICANT CHANGE UP (ref 32–36)
MCV RBC AUTO: 96.3 FL — SIGNIFICANT CHANGE UP (ref 80–100)
MCV RBC AUTO: 96.3 FL — SIGNIFICANT CHANGE UP (ref 80–100)
MONOCYTES # BLD AUTO: 0.74 K/UL — SIGNIFICANT CHANGE UP (ref 0–0.9)
MONOCYTES # BLD AUTO: 0.74 K/UL — SIGNIFICANT CHANGE UP (ref 0–0.9)
MONOCYTES NFR BLD AUTO: 5.6 % — SIGNIFICANT CHANGE UP (ref 2–14)
MONOCYTES NFR BLD AUTO: 5.6 % — SIGNIFICANT CHANGE UP (ref 2–14)
NEUTROPHILS # BLD AUTO: 11.19 K/UL — HIGH (ref 1.8–7.4)
NEUTROPHILS # BLD AUTO: 11.19 K/UL — HIGH (ref 1.8–7.4)
NEUTROPHILS NFR BLD AUTO: 85 % — HIGH (ref 43–77)
NEUTROPHILS NFR BLD AUTO: 85 % — HIGH (ref 43–77)
NITRITE UR-MCNC: POSITIVE
NITRITE UR-MCNC: POSITIVE
PH UR: 5 — SIGNIFICANT CHANGE UP (ref 5–8)
PH UR: 5 — SIGNIFICANT CHANGE UP (ref 5–8)
PLATELET # BLD AUTO: 177 K/UL — SIGNIFICANT CHANGE UP (ref 150–400)
PLATELET # BLD AUTO: 177 K/UL — SIGNIFICANT CHANGE UP (ref 150–400)
POTASSIUM SERPL-MCNC: 3.5 MMOL/L — SIGNIFICANT CHANGE UP (ref 3.5–5.3)
POTASSIUM SERPL-MCNC: 3.5 MMOL/L — SIGNIFICANT CHANGE UP (ref 3.5–5.3)
POTASSIUM SERPL-SCNC: 3.5 MMOL/L — SIGNIFICANT CHANGE UP (ref 3.5–5.3)
POTASSIUM SERPL-SCNC: 3.5 MMOL/L — SIGNIFICANT CHANGE UP (ref 3.5–5.3)
PROT SERPL-MCNC: 6.4 GM/DL — SIGNIFICANT CHANGE UP (ref 6–8.3)
PROT SERPL-MCNC: 6.4 GM/DL — SIGNIFICANT CHANGE UP (ref 6–8.3)
PROT UR-MCNC: 30 MG/DL
PROT UR-MCNC: 30 MG/DL
RAPID RVP RESULT: SIGNIFICANT CHANGE UP
RAPID RVP RESULT: SIGNIFICANT CHANGE UP
RBC # BLD: 3.83 M/UL — SIGNIFICANT CHANGE UP (ref 3.8–5.2)
RBC # BLD: 3.83 M/UL — SIGNIFICANT CHANGE UP (ref 3.8–5.2)
RBC # FLD: 12.9 % — SIGNIFICANT CHANGE UP (ref 10.3–14.5)
RBC # FLD: 12.9 % — SIGNIFICANT CHANGE UP (ref 10.3–14.5)
RBC CASTS # UR COMP ASSIST: 30 /HPF — HIGH (ref 0–4)
RBC CASTS # UR COMP ASSIST: 30 /HPF — HIGH (ref 0–4)
SARS-COV-2 RNA SPEC QL NAA+PROBE: SIGNIFICANT CHANGE UP
SARS-COV-2 RNA SPEC QL NAA+PROBE: SIGNIFICANT CHANGE UP
SODIUM SERPL-SCNC: 146 MMOL/L — HIGH (ref 135–145)
SODIUM SERPL-SCNC: 146 MMOL/L — HIGH (ref 135–145)
SP GR SPEC: >1.03 — HIGH (ref 1–1.03)
SP GR SPEC: >1.03 — HIGH (ref 1–1.03)
SQUAMOUS # UR AUTO: 2 /HPF — SIGNIFICANT CHANGE UP (ref 0–5)
SQUAMOUS # UR AUTO: 2 /HPF — SIGNIFICANT CHANGE UP (ref 0–5)
TROPONIN I, HIGH SENSITIVITY RESULT: 33.77 NG/L — SIGNIFICANT CHANGE UP
TROPONIN I, HIGH SENSITIVITY RESULT: 33.77 NG/L — SIGNIFICANT CHANGE UP
UROBILINOGEN FLD QL: 1 MG/DL — SIGNIFICANT CHANGE UP (ref 0.2–1)
UROBILINOGEN FLD QL: 1 MG/DL — SIGNIFICANT CHANGE UP (ref 0.2–1)
WBC # BLD: 13.16 K/UL — HIGH (ref 3.8–10.5)
WBC # BLD: 13.16 K/UL — HIGH (ref 3.8–10.5)
WBC # FLD AUTO: 13.16 K/UL — HIGH (ref 3.8–10.5)
WBC # FLD AUTO: 13.16 K/UL — HIGH (ref 3.8–10.5)
WBC UR QL: 15 /HPF — HIGH (ref 0–5)
WBC UR QL: 15 /HPF — HIGH (ref 0–5)

## 2023-11-30 PROCEDURE — 99285 EMERGENCY DEPT VISIT HI MDM: CPT | Mod: FS

## 2023-11-30 PROCEDURE — 74177 CT ABD & PELVIS W/CONTRAST: CPT | Mod: 26,MA

## 2023-11-30 PROCEDURE — 87493 C DIFF AMPLIFIED PROBE: CPT

## 2023-11-30 PROCEDURE — 94640 AIRWAY INHALATION TREATMENT: CPT

## 2023-11-30 PROCEDURE — 97116 GAIT TRAINING THERAPY: CPT | Mod: GP

## 2023-11-30 PROCEDURE — 97162 PT EVAL MOD COMPLEX 30 MIN: CPT | Mod: GP

## 2023-11-30 PROCEDURE — 93010 ELECTROCARDIOGRAM REPORT: CPT

## 2023-11-30 PROCEDURE — 87635 SARS-COV-2 COVID-19 AMP PRB: CPT

## 2023-11-30 PROCEDURE — 99497 ADVNCD CARE PLAN 30 MIN: CPT | Mod: 25

## 2023-11-30 PROCEDURE — 99223 1ST HOSP IP/OBS HIGH 75: CPT

## 2023-11-30 PROCEDURE — 71045 X-RAY EXAM CHEST 1 VIEW: CPT | Mod: 26

## 2023-11-30 PROCEDURE — 85027 COMPLETE CBC AUTOMATED: CPT

## 2023-11-30 PROCEDURE — 83735 ASSAY OF MAGNESIUM: CPT

## 2023-11-30 PROCEDURE — 97530 THERAPEUTIC ACTIVITIES: CPT | Mod: GP

## 2023-11-30 PROCEDURE — 82306 VITAMIN D 25 HYDROXY: CPT

## 2023-11-30 PROCEDURE — 72125 CT NECK SPINE W/O DYE: CPT | Mod: 26,MA

## 2023-11-30 PROCEDURE — 74176 CT ABD & PELVIS W/O CONTRAST: CPT

## 2023-11-30 PROCEDURE — 36415 COLL VENOUS BLD VENIPUNCTURE: CPT

## 2023-11-30 PROCEDURE — 84100 ASSAY OF PHOSPHORUS: CPT

## 2023-11-30 PROCEDURE — 92610 EVALUATE SWALLOWING FUNCTION: CPT | Mod: GN

## 2023-11-30 PROCEDURE — 71275 CT ANGIOGRAPHY CHEST: CPT | Mod: 26,MA

## 2023-11-30 PROCEDURE — 83036 HEMOGLOBIN GLYCOSYLATED A1C: CPT

## 2023-11-30 PROCEDURE — 99233 SBSQ HOSP IP/OBS HIGH 50: CPT

## 2023-11-30 PROCEDURE — 80053 COMPREHEN METABOLIC PANEL: CPT

## 2023-11-30 PROCEDURE — 92523 SPEECH SOUND LANG COMPREHEN: CPT | Mod: GN

## 2023-11-30 PROCEDURE — 80048 BASIC METABOLIC PNL TOTAL CA: CPT

## 2023-11-30 PROCEDURE — 85730 THROMBOPLASTIN TIME PARTIAL: CPT

## 2023-11-30 PROCEDURE — 85025 COMPLETE CBC W/AUTO DIFF WBC: CPT

## 2023-11-30 PROCEDURE — 70450 CT HEAD/BRAIN W/O DYE: CPT | Mod: 26,MA

## 2023-11-30 PROCEDURE — 85610 PROTHROMBIN TIME: CPT

## 2023-11-30 PROCEDURE — 83970 ASSAY OF PARATHORMONE: CPT

## 2023-11-30 PROCEDURE — 82310 ASSAY OF CALCIUM: CPT

## 2023-11-30 PROCEDURE — 87040 BLOOD CULTURE FOR BACTERIA: CPT

## 2023-11-30 RX ORDER — PANTOPRAZOLE SODIUM 20 MG/1
1 TABLET, DELAYED RELEASE ORAL
Qty: 0 | Refills: 0 | DISCHARGE

## 2023-11-30 RX ORDER — PIPERACILLIN AND TAZOBACTAM 4; .5 G/20ML; G/20ML
3.38 INJECTION, POWDER, LYOPHILIZED, FOR SOLUTION INTRAVENOUS EVERY 8 HOURS
Refills: 0 | Status: DISCONTINUED | OUTPATIENT
Start: 2023-12-01 | End: 2023-12-04

## 2023-11-30 RX ORDER — ACETAMINOPHEN 500 MG
1 TABLET ORAL
Qty: 0 | Refills: 0 | DISCHARGE

## 2023-11-30 RX ORDER — WARFARIN SODIUM 2.5 MG/1
1 TABLET ORAL
Qty: 0 | Refills: 0 | DISCHARGE

## 2023-11-30 RX ORDER — CHOLECALCIFEROL (VITAMIN D3) 125 MCG
2 CAPSULE ORAL
Refills: 0 | DISCHARGE

## 2023-11-30 RX ORDER — CEFEPIME 1 G/1
1000 INJECTION, POWDER, FOR SOLUTION INTRAMUSCULAR; INTRAVENOUS ONCE
Refills: 0 | Status: DISCONTINUED | OUTPATIENT
Start: 2023-11-30 | End: 2023-11-30

## 2023-11-30 RX ORDER — ACETAMINOPHEN 500 MG
2 TABLET ORAL
Refills: 0 | DISCHARGE

## 2023-11-30 RX ORDER — CEFEPIME 1 G/1
1000 INJECTION, POWDER, FOR SOLUTION INTRAMUSCULAR; INTRAVENOUS ONCE
Refills: 0 | Status: COMPLETED | OUTPATIENT
Start: 2023-11-30 | End: 2023-11-30

## 2023-11-30 RX ORDER — VANCOMYCIN HCL 1 G
750 VIAL (EA) INTRAVENOUS ONCE
Refills: 0 | Status: COMPLETED | OUTPATIENT
Start: 2023-11-30 | End: 2023-11-30

## 2023-11-30 RX ORDER — HEPARIN SODIUM 5000 [USP'U]/ML
5000 INJECTION INTRAVENOUS; SUBCUTANEOUS EVERY 12 HOURS
Refills: 0 | Status: DISCONTINUED | OUTPATIENT
Start: 2023-11-30 | End: 2023-12-04

## 2023-11-30 RX ORDER — CLONAZEPAM 1 MG
1 TABLET ORAL
Qty: 0 | Refills: 0 | DISCHARGE

## 2023-11-30 RX ORDER — GABAPENTIN 400 MG/1
3 CAPSULE ORAL
Qty: 0 | Refills: 0 | DISCHARGE

## 2023-11-30 RX ORDER — OXYCODONE HYDROCHLORIDE 5 MG/1
0.5 TABLET ORAL
Qty: 0 | Refills: 0 | DISCHARGE

## 2023-11-30 RX ORDER — PREGABALIN 225 MG/1
1 CAPSULE ORAL
Refills: 0 | DISCHARGE

## 2023-11-30 RX ORDER — IPRATROPIUM/ALBUTEROL SULFATE 18-103MCG
3 AEROSOL WITH ADAPTER (GRAM) INHALATION EVERY 6 HOURS
Refills: 0 | Status: DISCONTINUED | OUTPATIENT
Start: 2023-11-30 | End: 2023-12-01

## 2023-11-30 RX ORDER — ESCITALOPRAM OXALATE 10 MG/1
1.5 TABLET, FILM COATED ORAL
Refills: 0 | DISCHARGE

## 2023-11-30 RX ORDER — PIPERACILLIN AND TAZOBACTAM 4; .5 G/20ML; G/20ML
3.38 INJECTION, POWDER, LYOPHILIZED, FOR SOLUTION INTRAVENOUS ONCE
Refills: 0 | Status: COMPLETED | OUTPATIENT
Start: 2023-11-30 | End: 2023-11-30

## 2023-11-30 RX ORDER — PSYLLIUM SEED (WITH DEXTROSE)
0 POWDER (GRAM) ORAL
Qty: 0 | Refills: 0 | DISCHARGE

## 2023-11-30 RX ORDER — ONDANSETRON 8 MG/1
4 TABLET, FILM COATED ORAL EVERY 8 HOURS
Refills: 0 | Status: DISCONTINUED | OUTPATIENT
Start: 2023-11-30 | End: 2023-12-04

## 2023-11-30 RX ORDER — CLONAZEPAM 1 MG
1 TABLET ORAL
Refills: 0 | DISCHARGE

## 2023-11-30 RX ORDER — PIPERACILLIN AND TAZOBACTAM 4; .5 G/20ML; G/20ML
3.38 INJECTION, POWDER, LYOPHILIZED, FOR SOLUTION INTRAVENOUS ONCE
Refills: 0 | Status: COMPLETED | OUTPATIENT
Start: 2023-12-01 | End: 2023-12-01

## 2023-11-30 RX ORDER — PREGABALIN 225 MG/1
1 CAPSULE ORAL
Qty: 0 | Refills: 0 | DISCHARGE

## 2023-11-30 RX ADMIN — CEFEPIME 1000 MILLIGRAM(S): 1 INJECTION, POWDER, FOR SOLUTION INTRAMUSCULAR; INTRAVENOUS at 17:07

## 2023-11-30 RX ADMIN — PIPERACILLIN AND TAZOBACTAM 200 GRAM(S): 4; .5 INJECTION, POWDER, LYOPHILIZED, FOR SOLUTION INTRAVENOUS at 20:39

## 2023-11-30 RX ADMIN — HEPARIN SODIUM 5000 UNIT(S): 5000 INJECTION INTRAVENOUS; SUBCUTANEOUS at 23:29

## 2023-11-30 RX ADMIN — Medication 250 MILLIGRAM(S): at 17:08

## 2023-11-30 NOTE — PATIENT PROFILE ADULT - DEAF OR HARD OF HEARING?
cc:

Geovanny Brice MD

****

 

 

DATE OF ADMISSION:

03/09/fetal loss

by maintaining the pregnancy.  I have conferred with Dr. Merino 

today and her cardiologist and have the written reports from Dr. Melvin

today which I have shared with her.  They understand and agree.

 

 

__________________________________

MD DEUCE Roth/YANCY

D: 03/09/2018, 05:29 PM

T: 03/09/2018, 06:58 PM

Visit #: [id number]

Job #: [id number] no

## 2023-11-30 NOTE — ED PROVIDER NOTE - OBJECTIVE STATEMENT
77 y/o female with a PMHx of anxiety, chronic GERD, hepatic cyst, osteoporosis presents to the ED BIBA from [facility] for evaluation. Per EMS, pt was found on the floor unresponsive with blue lips after choking on yams. Chest compressions were performed x4, yams suctioned from mouth and pt brought to the ED for evaluation.

## 2023-11-30 NOTE — CONSULT NOTE ADULT - SUBJECTIVE AND OBJECTIVE BOX
HPI:  Cough  Possible food impaction    PAST MEDICAL & SURGICAL HISTORY:  Anxiety      Chronic GERD      Osteoporosis      Hepatic cyst      Status post lumbar spine operation          MEDICATIONS  (STANDING):    MEDICATIONS  (PRN):      Allergies    No Known Drug Allergies  Shrimp (Unknown)  shellfish (Unknown)    Intolerances        SOCIAL HISTORY:    FAMILY HISTORY:  FH: diabetes mellitus    FHx: leukemia  mother, age 50s     Non-contributory    REVIEW OF SYSTEMS      General:	    Respiratory and Thorax:  	  Cardiovascular:	    Gastrointestinal:	    Musculoskeletal:	   Vital Signs Last 24 Hrs  T(C): 36.7 (30 Nov 2023 13:19), Max: 36.7 (30 Nov 2023 13:19)  T(F): 98 (30 Nov 2023 13:19), Max: 98 (30 Nov 2023 13:19)  HR: 84 (30 Nov 2023 13:19) (84 - 84)  BP: 102/64 (30 Nov 2023 13:19) (102/64 - 102/64)  BP(mean): --  RR: 18 (30 Nov 2023 13:40) (18 - 22)  SpO2: 100% (30 Nov 2023 13:40) (85% - 100%)    Parameters below as of 30 Nov 2023 13:40  Patient On (Oxygen Delivery Method): mask, nonrebreather  O2 Flow (L/min): 15      HEENT :No Pallor.No icterus. EOMI,PERLAA  Chest : Few Rales  CVS : S1S2 Normal.No murmurs.  Abdomen: Soft.Non tender .Normal bowel sounds.No Organomegaly.  CNS: Alert.Oriented to Time,Place and Person.No focal deficit.  EXT: Normal Range of motion.No pitting edema.    LABS:                        12.3   13.16 )-----------( 177      ( 30 Nov 2023 13:29 )             36.9     11-30    146<H>  |  114<H>  |  23  ----------------------------<  183<H>  3.5   |  23  |  0.99    Ca    8.7      30 Nov 2023 13:29    TPro  6.4  /  Alb  2.9<L>  /  TBili  0.7  /  DBili  x   /  AST  44<H>  /  ALT  32  /  AlkPhos  73  11-30      LIVER FUNCTIONS - ( 30 Nov 2023 13:29 )  Alb: 2.9 g/dL / Pro: 6.4 gm/dL / ALK PHOS: 73 U/L / ALT: 32 U/L / AST: 44 U/L / GGT: x             RADIOLOGY & ADDITIONAL STUDIES:

## 2023-11-30 NOTE — H&P ADULT - HIV OFFER
Detail Level: Simple Additional Notes: Recommend consult with [facility] Additional Notes: SPF >30 Opt out

## 2023-11-30 NOTE — ED ADULT TRIAGE NOTE - CHIEF COMPLAINT QUOTE
BIB EMS FROM [facility]. AS PER EMS PT WAS FOUND ON FLOOR UNRESPONSIVE, BLUE LIPS AFTER CHOKING ON YAMS. PT WAS DOWN FOR A FEW MINUTES. EMS SUCTIONED PT , PLACED ON NRB  O2 EN ROUTE WAS 94%. PT IN EMS 02@85 % SPEAKING. PT HAS A MOLST. PMH: ANXIETY, OSTEOPOROSIS. PT PLACED IN TRAUMA ROOM MD QUIÑONES AT BEDSIDE.

## 2023-11-30 NOTE — ED PROVIDER NOTE - ATTENDING APP SHARED VISIT CONTRIBUTION OF CARE
I, Regulo Chavez MD, personally saw the patient with KATERINA.  I have personally performed a face to face diagnostic evaluation on this patient.  I have reviewed the KATERINA note and agree with the history, exam, and plan of care, except as noted.  I personally saw the patient and performed a substantive portion of the visit including all aspects of the medical decision making.

## 2023-11-30 NOTE — H&P ADULT - MUSCULOSKELETAL
negative no calf tenderness/strength 5/5 bilateral upper extremities/no chest wall tenderness/decreased strength

## 2023-11-30 NOTE — ED PROVIDER NOTE - CARE PLAN
Principal Discharge DX:	LLL pneumonia  Secondary Diagnosis:	Pneumonia, aspiration  Secondary Diagnosis:	Hypoxia   1

## 2023-11-30 NOTE — H&P ADULT - CONVERSATION DETAILS
16 min spent confirming pt wishes and discussing advanced care planning- pt is a DNR/DNI, ILANA in chart

## 2023-11-30 NOTE — ED PROVIDER NOTE - IV ALTEPLASE EXCL REL HIDDEN
Anesthesia Post-op Note    Patient: Eulogio Boo  Procedure(s) Performed: LAPAROSCOPIC APPENDECTOMY  Anesthesia type: General    Vitals Value Taken Time   Temp 37 °C (98.6 °F) 06/03/23 1501   Pulse 103 06/03/23 1501   Resp 22 06/03/23 1501   SpO2 100 % 06/03/23 1501   /89 06/03/23 1501         Patient Location: PACU Phase 1  Post-op Vital Signs:stable  Level of Consciousness: participates in exam, awake and alert  Respiratory Status: spontaneous ventilation and face mask  Cardiovascular blood pressure returned to baseline and stable  Hydration: euvolemic  Pain Management: well controlled  Handoff: Handoff to receiving clinician was performed and questions were answered  Vomiting: none  Nausea: None  Airway Patency:patent  Post-op Assessment: awake, alert, appropriately conversant, or baseline, no complications, patient tolerated procedure well, no evidence of recall, dentition within defined limits, moving all extremities and No Corneal Abrasion      There were no known notable events for this encounter.   show

## 2023-11-30 NOTE — ED PROVIDER NOTE - MUSCULOSKELETAL, MLM
Spine appears normal, range of motion is not limited. TTP lower chest wall and upper abd. Trace pedal edema

## 2023-11-30 NOTE — ED ADULT NURSE NOTE - OBJECTIVE STATEMENT
Pt A&Ox2, presents to the ED from [facility] s/p choking on Yams. Pt found unresponsive with blue lips at facility, compressions performed. Pt talking in complete sentences. SpO2 86-88% on room air, pt placed on NRB. Food noted around mouth and on clothing.

## 2023-11-30 NOTE — H&P ADULT - TIME BILLING
A minimum of 75 min was spent completing this admission, counseling patient on current acute on chronic conditions and discussing plan and coordination of care including diagnostic remaining NPO until further evaluation due to high aspiration risk and also need for  in setting of having an apneic episode.

## 2023-11-30 NOTE — H&P ADULT - ASSESSMENT
Pt is a 77 yo female who presents to ED BIBA, s/p aspiration of Yams followed by apneic episode, admitted due to;    #Aspiration PNA  #Apnea  RVP/COVID negative  CT: Food impaction in proximal esophagus  GI consult appreciated- will keep NPO and treat for aspiration  Speech and swallow evaluation, until then NPO  Aspiration precautions  Pt asymptomatic at this time  Zosyn started  Duonebs prn sob/wheezing  Supplemental O2 PRN, currently saturating 95% on RA  s/p 4 compressions to chest- pt comes with MOLST for DNR/DNI, order placed, MOLST in chart    #Acute UTI  f/u urine culture  c/w IV abx therapy    #Protein calorie malnutrition  nutrition consult    #Hyperglycemia  f/u A1c  pending trend on serum chemistry and above result, start AISS/accuchecks/carb rest.

## 2023-11-30 NOTE — PATIENT PROFILE ADULT - FALL HARM RISK - FALLEN IN PAST
Routing refill request to provider for review/approval because:  Medication is reported/historical    Checo Navarrete RN, BSN           Accidental fall

## 2023-11-30 NOTE — H&P ADULT - HISTORY OF PRESENT ILLNESS
Pt is a 79 yo female who has a pmh/o chronic back pain, GERD, anxiety, OP, hepatic cysts, LLE DVT not on AC per NH chart, displaced left intertrochanteric hip fracture, who resides at [facility] and was found on floor with blue lips and unresponsiveness, got 4 chest compressions. Yams suctioned from mouth and pt BIBA to  ED. Pt AAOx3 currently however states the last thing she remembers was eating then falling over. Pt originally placed on 2L NC for pox of 88% then switched to NRB with improvement of oxygen to 95%. GI and ICU consulted and no further intervention recommended other than admission to medicine for treatment of aspiration PNA. On admitting exam pt with areas of ecchymoses but states she has no pain on palpation. Pt also noted to have dried yams around face, cleaned. Pt appears comfortable, now saturating well on Room air. States she has had difficulty swallowing in past.

## 2023-11-30 NOTE — PROGRESS NOTE ADULT - ASSESSMENT
A/P: 78 female who had an esophageal food impaction and choked now resulting in a LLL aspiration Pneumonia    Plan:  Strict NPO at this time  Needs to be seen by GI for the Food impaction  She can be maintained on Room Air  Would start Zosyn  Of not she has a MOLST form indicating she is a DNR/DNI  Patient does not require the ICU/CCU/SICU  D/W ED Attending  D/W the Hospitalist

## 2023-11-30 NOTE — ED PROVIDER NOTE - PROGRESS NOTE DETAILS
79 yo female with anxiety, gerd, WAS BIBA from [facility] for a period of unresponsiveness. Per EMS, staff performed chest compressions and noticed the pt expelled yam from her mouth and came to. Pt is unsure of the episode but currently A&OPx3. O2 saturation 88 on 2L, increased to 4L NC. O2 saturation remained at 88%, switched to NRB and increased to 94-95%. Hematoma to the L chest.   Will check labs, CTs, EKG and reeval. -Ismael Pichardo PA-C CT with LLL PNA. Since pt lives in [facility] will treat with hcap. Possible food impaction. GI Dr. Montanez consulted and came to see pt. Pt is not drooling, tolerating saliva and water without vomiting, no intervention needed per GI. Hospitalist also wanted ICU consulted since pt had chest compressions performed. ICU Dr Bernardo came to se pt. Pt sating well on RA, will no take to the ICU. Pt to be admitted to medicine. -Ismael Pichardo PA-C

## 2023-11-30 NOTE — ED ADULT NURSE NOTE - NSFALLHARMRISKINTERV_ED_ALL_ED

## 2023-11-30 NOTE — PATIENT PROFILE ADULT - FALL HARM RISK - HARM RISK INTERVENTIONS

## 2023-11-30 NOTE — PROGRESS NOTE ADULT - SUBJECTIVE AND OBJECTIVE BOX
CC: Called to the ED to see the patient after chocking on dinner and was reported to have 4 chest compressions    HPI: 77 y/o female with a PMHx of anxiety, chronic GERD, hepatic cyst, osteoporosis presents to the ED BIBA from [facility] for evaluation. Per EMS, pt was found on the floor unresponsive with blue lips after choking on yams. Chest compressions were performed x4, yams suctioned from mouth.    In the ED the patient is awake and alert on room air with an O2 sat of 95%.  CT Chest showing food impaction and and likely LLL PNA.       PAST MEDICAL & SURGICAL HISTORY:  Anxiety      Chronic GERD      Osteoporosis      Hepatic cyst      Status post lumbar spine operation          FAMILY HISTORY:  FH: diabetes mellitus    FHx: leukemia  mother, age 50s        Social Hx:    Allergies    No Known Drug Allergies  Shrimp (Unknown)  shellfish (Unknown)    Intolerances          Weight (kg): 58.2 (11-30 @ 16:36)    ICU Vital Signs Last 24 Hrs  T(C): 36.7 (30 Nov 2023 13:19), Max: 36.7 (30 Nov 2023 13:19)  T(F): 98 (30 Nov 2023 13:19), Max: 98 (30 Nov 2023 13:19)  HR: 84 (30 Nov 2023 13:19) (84 - 84)  BP: 102/64 (30 Nov 2023 13:19) (102/64 - 102/64)  BP(mean): --  ABP: --  ABP(mean): --  RR: 18 (30 Nov 2023 13:40) (18 - 22)  SpO2: 100% (30 Nov 2023 13:40) (85% - 100%)    O2 Parameters below as of 30 Nov 2023 13:40  Patient On (Oxygen Delivery Method): mask, nonrebreather  O2 Flow (L/min): 15              I&O's Summary                            12.3   13.16 )-----------( 177      ( 30 Nov 2023 13:29 )             36.9       11-30    146<H>  |  114<H>  |  23  ----------------------------<  183<H>  3.5   |  23  |  0.99    Ca    8.7      30 Nov 2023 13:29    TPro  6.4  /  Alb  2.9<L>  /  TBili  0.7  /  DBili  x   /  AST  44<H>  /  ALT  32  /  AlkPhos  73  11-30                Urinalysis Basic - ( 30 Nov 2023 16:23 )    Color: Dark Yellow / Appearance: Clear / SG: >1.030 / pH: x  Gluc: x / Ketone: Negative mg/dL  / Bili: Negative / Urobili: 1.0 mg/dL   Blood: x / Protein: 30 mg/dL / Nitrite: Positive   Leuk Esterase: Negative / RBC: 30 /HPF / WBC 15 /HPF   Sq Epi: x / Non Sq Epi: 2 /HPF / Bacteria: Many /HPF        MEDICATIONS  (STANDING):  cefepime  Injectable. 1000 milliGRAM(s) IV Push once  vancomycin  IVPB. 750 milliGRAM(s) IV Intermittent once    MEDICATIONS  (PRN):      DVT Prophylaxis:    Advanced Directives:  Discussed with:    Visit Information:    ** Time is exclusive of billed procedures and/or teaching and/or routine family updates.

## 2023-11-30 NOTE — PHARMACOTHERAPY INTERVENTION NOTE - COMMENTS
Medication history complete, patient is from [facility], reviewed and confirmed medication with list provided by facility.

## 2023-11-30 NOTE — CONSULT NOTE ADULT - ASSESSMENT
Dysphagia  Aspiration of food   No evidence of impaction  Plan  NPO  Rx for Aspiration Pneumonia  Will follow

## 2023-12-01 LAB
A1C WITH ESTIMATED AVERAGE GLUCOSE RESULT: 5.2 % — SIGNIFICANT CHANGE UP (ref 4–5.6)
A1C WITH ESTIMATED AVERAGE GLUCOSE RESULT: 5.2 % — SIGNIFICANT CHANGE UP (ref 4–5.6)
ALBUMIN SERPL ELPH-MCNC: 2.6 G/DL — LOW (ref 3.3–5)
ALBUMIN SERPL ELPH-MCNC: 2.6 G/DL — LOW (ref 3.3–5)
ALP SERPL-CCNC: 71 U/L — SIGNIFICANT CHANGE UP (ref 40–120)
ALP SERPL-CCNC: 71 U/L — SIGNIFICANT CHANGE UP (ref 40–120)
ALT FLD-CCNC: 22 U/L — SIGNIFICANT CHANGE UP (ref 12–78)
ALT FLD-CCNC: 22 U/L — SIGNIFICANT CHANGE UP (ref 12–78)
ANION GAP SERPL CALC-SCNC: 6 MMOL/L — SIGNIFICANT CHANGE UP (ref 5–17)
ANION GAP SERPL CALC-SCNC: 6 MMOL/L — SIGNIFICANT CHANGE UP (ref 5–17)
APTT BLD: 48.3 SEC — HIGH (ref 24.5–35.6)
APTT BLD: 48.3 SEC — HIGH (ref 24.5–35.6)
AST SERPL-CCNC: 21 U/L — SIGNIFICANT CHANGE UP (ref 15–37)
AST SERPL-CCNC: 21 U/L — SIGNIFICANT CHANGE UP (ref 15–37)
BASOPHILS # BLD AUTO: 0.02 K/UL — SIGNIFICANT CHANGE UP (ref 0–0.2)
BASOPHILS # BLD AUTO: 0.02 K/UL — SIGNIFICANT CHANGE UP (ref 0–0.2)
BASOPHILS NFR BLD AUTO: 0.2 % — SIGNIFICANT CHANGE UP (ref 0–2)
BASOPHILS NFR BLD AUTO: 0.2 % — SIGNIFICANT CHANGE UP (ref 0–2)
BILIRUB SERPL-MCNC: 0.8 MG/DL — SIGNIFICANT CHANGE UP (ref 0.2–1.2)
BILIRUB SERPL-MCNC: 0.8 MG/DL — SIGNIFICANT CHANGE UP (ref 0.2–1.2)
BUN SERPL-MCNC: 18 MG/DL — SIGNIFICANT CHANGE UP (ref 7–23)
BUN SERPL-MCNC: 18 MG/DL — SIGNIFICANT CHANGE UP (ref 7–23)
CALCIUM SERPL-MCNC: 8.4 MG/DL — LOW (ref 8.5–10.1)
CALCIUM SERPL-MCNC: 8.4 MG/DL — LOW (ref 8.5–10.1)
CHLORIDE SERPL-SCNC: 112 MMOL/L — HIGH (ref 96–108)
CHLORIDE SERPL-SCNC: 112 MMOL/L — HIGH (ref 96–108)
CO2 SERPL-SCNC: 26 MMOL/L — SIGNIFICANT CHANGE UP (ref 22–31)
CO2 SERPL-SCNC: 26 MMOL/L — SIGNIFICANT CHANGE UP (ref 22–31)
CREAT SERPL-MCNC: 0.72 MG/DL — SIGNIFICANT CHANGE UP (ref 0.5–1.3)
CREAT SERPL-MCNC: 0.72 MG/DL — SIGNIFICANT CHANGE UP (ref 0.5–1.3)
EGFR: 86 ML/MIN/1.73M2 — SIGNIFICANT CHANGE UP
EGFR: 86 ML/MIN/1.73M2 — SIGNIFICANT CHANGE UP
EOSINOPHIL # BLD AUTO: 0.06 K/UL — SIGNIFICANT CHANGE UP (ref 0–0.5)
EOSINOPHIL # BLD AUTO: 0.06 K/UL — SIGNIFICANT CHANGE UP (ref 0–0.5)
EOSINOPHIL NFR BLD AUTO: 0.6 % — SIGNIFICANT CHANGE UP (ref 0–6)
EOSINOPHIL NFR BLD AUTO: 0.6 % — SIGNIFICANT CHANGE UP (ref 0–6)
ESTIMATED AVERAGE GLUCOSE: 103 MG/DL — SIGNIFICANT CHANGE UP (ref 68–114)
ESTIMATED AVERAGE GLUCOSE: 103 MG/DL — SIGNIFICANT CHANGE UP (ref 68–114)
GLUCOSE SERPL-MCNC: 102 MG/DL — HIGH (ref 70–99)
GLUCOSE SERPL-MCNC: 102 MG/DL — HIGH (ref 70–99)
HCT VFR BLD CALC: 36.6 % — SIGNIFICANT CHANGE UP (ref 34.5–45)
HCT VFR BLD CALC: 36.6 % — SIGNIFICANT CHANGE UP (ref 34.5–45)
HGB BLD-MCNC: 12 G/DL — SIGNIFICANT CHANGE UP (ref 11.5–15.5)
HGB BLD-MCNC: 12 G/DL — SIGNIFICANT CHANGE UP (ref 11.5–15.5)
IMM GRANULOCYTES NFR BLD AUTO: 0.4 % — SIGNIFICANT CHANGE UP (ref 0–0.9)
IMM GRANULOCYTES NFR BLD AUTO: 0.4 % — SIGNIFICANT CHANGE UP (ref 0–0.9)
INR BLD: 2.97 RATIO — HIGH (ref 0.85–1.18)
INR BLD: 2.97 RATIO — HIGH (ref 0.85–1.18)
LYMPHOCYTES # BLD AUTO: 1.42 K/UL — SIGNIFICANT CHANGE UP (ref 1–3.3)
LYMPHOCYTES # BLD AUTO: 1.42 K/UL — SIGNIFICANT CHANGE UP (ref 1–3.3)
LYMPHOCYTES # BLD AUTO: 14.7 % — SIGNIFICANT CHANGE UP (ref 13–44)
LYMPHOCYTES # BLD AUTO: 14.7 % — SIGNIFICANT CHANGE UP (ref 13–44)
MCHC RBC-ENTMCNC: 31.7 PG — SIGNIFICANT CHANGE UP (ref 27–34)
MCHC RBC-ENTMCNC: 31.7 PG — SIGNIFICANT CHANGE UP (ref 27–34)
MCHC RBC-ENTMCNC: 32.8 GM/DL — SIGNIFICANT CHANGE UP (ref 32–36)
MCHC RBC-ENTMCNC: 32.8 GM/DL — SIGNIFICANT CHANGE UP (ref 32–36)
MCV RBC AUTO: 96.8 FL — SIGNIFICANT CHANGE UP (ref 80–100)
MCV RBC AUTO: 96.8 FL — SIGNIFICANT CHANGE UP (ref 80–100)
MONOCYTES # BLD AUTO: 0.67 K/UL — SIGNIFICANT CHANGE UP (ref 0–0.9)
MONOCYTES # BLD AUTO: 0.67 K/UL — SIGNIFICANT CHANGE UP (ref 0–0.9)
MONOCYTES NFR BLD AUTO: 6.9 % — SIGNIFICANT CHANGE UP (ref 2–14)
MONOCYTES NFR BLD AUTO: 6.9 % — SIGNIFICANT CHANGE UP (ref 2–14)
NEUTROPHILS # BLD AUTO: 7.45 K/UL — HIGH (ref 1.8–7.4)
NEUTROPHILS # BLD AUTO: 7.45 K/UL — HIGH (ref 1.8–7.4)
NEUTROPHILS NFR BLD AUTO: 77.2 % — HIGH (ref 43–77)
NEUTROPHILS NFR BLD AUTO: 77.2 % — HIGH (ref 43–77)
PLATELET # BLD AUTO: 184 K/UL — SIGNIFICANT CHANGE UP (ref 150–400)
PLATELET # BLD AUTO: 184 K/UL — SIGNIFICANT CHANGE UP (ref 150–400)
POTASSIUM SERPL-MCNC: 3.4 MMOL/L — LOW (ref 3.5–5.3)
POTASSIUM SERPL-MCNC: 3.4 MMOL/L — LOW (ref 3.5–5.3)
POTASSIUM SERPL-SCNC: 3.4 MMOL/L — LOW (ref 3.5–5.3)
POTASSIUM SERPL-SCNC: 3.4 MMOL/L — LOW (ref 3.5–5.3)
PROT SERPL-MCNC: 6.2 GM/DL — SIGNIFICANT CHANGE UP (ref 6–8.3)
PROT SERPL-MCNC: 6.2 GM/DL — SIGNIFICANT CHANGE UP (ref 6–8.3)
PROTHROM AB SERPL-ACNC: 32.5 SEC — HIGH (ref 9.5–13)
PROTHROM AB SERPL-ACNC: 32.5 SEC — HIGH (ref 9.5–13)
RBC # BLD: 3.78 M/UL — LOW (ref 3.8–5.2)
RBC # BLD: 3.78 M/UL — LOW (ref 3.8–5.2)
RBC # FLD: 12.9 % — SIGNIFICANT CHANGE UP (ref 10.3–14.5)
RBC # FLD: 12.9 % — SIGNIFICANT CHANGE UP (ref 10.3–14.5)
SODIUM SERPL-SCNC: 144 MMOL/L — SIGNIFICANT CHANGE UP (ref 135–145)
SODIUM SERPL-SCNC: 144 MMOL/L — SIGNIFICANT CHANGE UP (ref 135–145)
WBC # BLD: 9.66 K/UL — SIGNIFICANT CHANGE UP (ref 3.8–10.5)
WBC # BLD: 9.66 K/UL — SIGNIFICANT CHANGE UP (ref 3.8–10.5)
WBC # FLD AUTO: 9.66 K/UL — SIGNIFICANT CHANGE UP (ref 3.8–10.5)
WBC # FLD AUTO: 9.66 K/UL — SIGNIFICANT CHANGE UP (ref 3.8–10.5)

## 2023-12-01 PROCEDURE — 99233 SBSQ HOSP IP/OBS HIGH 50: CPT

## 2023-12-01 RX ORDER — POLYETHYLENE GLYCOL 3350 17 G/17G
17 POWDER, FOR SOLUTION ORAL DAILY
Refills: 0 | Status: DISCONTINUED | OUTPATIENT
Start: 2023-12-01 | End: 2023-12-04

## 2023-12-01 RX ORDER — IPRATROPIUM/ALBUTEROL SULFATE 18-103MCG
3 AEROSOL WITH ADAPTER (GRAM) INHALATION EVERY 6 HOURS
Refills: 0 | Status: DISCONTINUED | OUTPATIENT
Start: 2023-12-01 | End: 2023-12-04

## 2023-12-01 RX ORDER — LIDOCAINE 4 G/100G
1 CREAM TOPICAL DAILY
Refills: 0 | Status: DISCONTINUED | OUTPATIENT
Start: 2023-12-01 | End: 2023-12-04

## 2023-12-01 RX ORDER — SODIUM CHLORIDE 9 MG/ML
1000 INJECTION INTRAMUSCULAR; INTRAVENOUS; SUBCUTANEOUS
Refills: 0 | Status: DISCONTINUED | OUTPATIENT
Start: 2023-12-01 | End: 2023-12-04

## 2023-12-01 RX ORDER — ACETAMINOPHEN 500 MG
650 TABLET ORAL EVERY 6 HOURS
Refills: 0 | Status: DISCONTINUED | OUTPATIENT
Start: 2023-12-01 | End: 2023-12-04

## 2023-12-01 RX ADMIN — Medication 3 MILLILITER(S): at 21:41

## 2023-12-01 RX ADMIN — SODIUM CHLORIDE 75 MILLILITER(S): 9 INJECTION INTRAMUSCULAR; INTRAVENOUS; SUBCUTANEOUS at 17:20

## 2023-12-01 RX ADMIN — PIPERACILLIN AND TAZOBACTAM 25 GRAM(S): 4; .5 INJECTION, POWDER, LYOPHILIZED, FOR SOLUTION INTRAVENOUS at 06:06

## 2023-12-01 RX ADMIN — LIDOCAINE 1 PATCH: 4 CREAM TOPICAL at 23:17

## 2023-12-01 RX ADMIN — LIDOCAINE 1 PATCH: 4 CREAM TOPICAL at 17:20

## 2023-12-01 RX ADMIN — HEPARIN SODIUM 5000 UNIT(S): 5000 INJECTION INTRAVENOUS; SUBCUTANEOUS at 12:00

## 2023-12-01 RX ADMIN — PIPERACILLIN AND TAZOBACTAM 25 GRAM(S): 4; .5 INJECTION, POWDER, LYOPHILIZED, FOR SOLUTION INTRAVENOUS at 23:05

## 2023-12-01 RX ADMIN — PIPERACILLIN AND TAZOBACTAM 25 GRAM(S): 4; .5 INJECTION, POWDER, LYOPHILIZED, FOR SOLUTION INTRAVENOUS at 16:20

## 2023-12-01 RX ADMIN — HEPARIN SODIUM 5000 UNIT(S): 5000 INJECTION INTRAVENOUS; SUBCUTANEOUS at 23:16

## 2023-12-01 NOTE — PHYSICAL THERAPY INITIAL EVALUATION ADULT - PRECAUTIONS/LIMITATIONS, REHAB EVAL
fall precautions/oxygen therapy device and L/min pt reports h/o swallowing problems/aspiration precautions/fall precautions/oxygen therapy device and L/min/swallowing precautions

## 2023-12-01 NOTE — SWALLOW BEDSIDE ASSESSMENT ADULT - SWALLOW EVAL: PROGNOSIS
2) On encounter, a cervical kyphosis was apparent. The pt was alert and interactive but somewhat anxious. She was able to verbalize during communicative probes without evidence of a primary motor speech or primary linguistic pathology. Though, it should be noted that several of her verbalizations were reflective of anxious thoughts.  Pt was able to verbalize her needs nonetheless. .

## 2023-12-01 NOTE — PHYSICAL THERAPY INITIAL EVALUATION ADULT - LEVEL OF INDEPENDENCE: SIT/STAND, REHAB EVAL
pt anxious on attempting standing at bedside c/o feet sliding despite Ayden tread socks, cued to flex knees more positioning feet further under body and blocked feet to insure adequate traction/minimum assist (75% patients effort)

## 2023-12-01 NOTE — SWALLOW BEDSIDE ASSESSMENT ADULT - SWALLOW EVAL: CRITERIA FOR SKILLED INTERVENTION MET
DO NOT FEEL THAT ACUTE SPEECH PATHOLOGY FOLLOW UP WOULD CHANGE CLINICAL MANAGEMENT/OUTCOME IN HOSPITAL SETTING. PT'S MOTOR SPEECH ABILITIES ARE PRESERVED, HER SPEECH INTELLIGIBILITY %, HER VERBALIZATIONS WERE LINGUISTICALLY INTACT AND HER MILD PRESBYPHAGIA IS STABLE/FUNCTIONAL WITH PRE-EXISTING COMPONENT. DO NOT FEEL THAT TRADITIONAL ST WOULD BE OF CLINICAL BENEFIT. GIVEN ABOVE, THIS SERVICE WILL NOT ACTIVELY FOLLOW. RECONSULT PRN SHOULD STATUS CHANGE AND CONDITION WARRANT.

## 2023-12-01 NOTE — PHYSICAL THERAPY INITIAL EVALUATION ADULT - LEVEL OF INDEPENDENCE: SCOOT/BRIDGE, REHAB EVAL
moves well in the bed bridging effectively BLEs all directions ,able to offload sacrococcygeal area/contact guard/stand-by assist

## 2023-12-01 NOTE — SWALLOW BEDSIDE ASSESSMENT ADULT - COMMENTS
The pt was admitted to  after being found on the ground in an unresponsive state at [facility]. When found, pt was cyanotic and chest compressions were implemented. Pt subsequently required oral suctioning when she became more responsive. The pt is alert in the hospital but anxious. Chest imaging is remarkable for mucoid impaction in left lower lobe as well as bilateral tree in bud opacities in distal airways. Moreover, retained food was apparent in her Esophagus during chest imaging. This profile is superimposed upon a history of anxiety, hypokalemia, hepatic cysts, OP, DJD of right knee, right hydronephrosis, GERD, hypokalemia. past T10 vertebral fracture,  prior kyphoplasty, and past left hip fracture NOS. The pt was admitted to  after being found on the ground in an unresponsive state at [facility]. When found, pt was cyanotic and chest compressions were implemented. Pt was also found to have food in approximation of her oral cavity at the time and subsequently required oral suctioning when she became more responsive. The pt is alert in the hospital but anxious. Chest imaging is remarkable for mucoid impaction in left lower lobe as well as bilateral tree in bud opacities in distal airways. Moreover, retained food was apparent in her Esophagus during chest imaging. This profile is superimposed upon a history of anxiety, hypokalemia, hepatic cysts, OP, DJD of right knee, right hydronephrosis, GERD, hypokalemia. past T10 vertebral fracture,  prior kyphoplasty, and past left hip fracture NOS.

## 2023-12-01 NOTE — PHYSICAL THERAPY INITIAL EVALUATION ADULT - LEVEL OF INDEPENDENCE: STAND/SIT, REHAB EVAL
returned to sitting on bed after each standing trial and  ultimately performed side-stepping at bedside toward HOB to LEFT with minA1/contact guard

## 2023-12-01 NOTE — DIETITIAN INITIAL EVALUATION ADULT - ADD RECOMMEND
1) ADAT as per SLP recommendations; c/w NPO restrictions at this time  2) Add ensure + HP shake BID (350kcal, 20g protein) when medically feasible  3) Encourage protein-rich foods, maximize food preferences when medically feasible  4) Monitor bowel movements, if no BM for >3 days, consider implementing bowel regimen.  5) MVI w/ minerals daily to ensure 100% RDA met  6) Monitor lytes/ min and replete prn.  7) Maintain aspiration precautions, back of bed >35 degrees.  8) Confirm goals of care regarding nutrition support; is DNR/DNI; however, nutrition support NOT addressed in MOLST.  RD will continue to monitor PO intake, labs, hydration, and wt prn.

## 2023-12-01 NOTE — PHYSICAL THERAPY INITIAL EVALUATION ADULT - SKIN COLOR/CHARACTERISTICS
large brownish bruise over R anterior knee with central small dry area of superficial excoriation; lesser degree of bruising anterior L knee but with 2 small areas of excoriation (patella and tibial tuberosisty ) also area of bruising noted L pectoral region (pt had rec'd chest compressions x4 at nursing home after choking episode per medical record)/bruised (ecchymotic) large brownish bruise over R anterior knee with central small dry area of superficial excoriation; lesser degree of bruising anterior L knee but with 2 small areas of excoriation (patella and tibial tuberosisty ) also area of bruising noted L pectoral region ([facility] after ch[facility] episode per medical record)/bruised (ecchymotic)

## 2023-12-01 NOTE — DIETITIAN INITIAL EVALUATION ADULT - PERTINENT MEDS FT
MEDICATIONS  (STANDING):  heparin   Injectable 5000 Unit(s) SubCutaneous every 12 hours  piperacillin/tazobactam IVPB.. 3.375 Gram(s) IV Intermittent every 8 hours    MEDICATIONS  (PRN):  albuterol/ipratropium for Nebulization 3 milliLiter(s) Nebulizer every 6 hours PRN Shortness of Breath and/or Wheezing  ondansetron Injectable 4 milliGRAM(s) IV Push every 8 hours PRN Nausea and/or Vomiting

## 2023-12-01 NOTE — PHYSICAL THERAPY INITIAL EVALUATION ADULT - PERTINENT HX OF CURRENT PROBLEM, REHAB EVAL
77 yo female who has a PMH of chronic back pain, GERD, anxiety, OP, hepatic cysts, LLE DVT not on AC per NH chart, displaced left intertrochanteric hip fracture, who resides at [facility] and was found on floor with blue lips and unresponsiveness, got 4 chest compressions. Yams suctioned from mouth and pt BIBA to  ED. Pt AAOx3 currently however states the last thing she remembers was eating then falling over. Pt originally placed on 2L NC for pox of 88% then switched to NRB with improvement of oxygen to 95%. GI and ICU consulted and no further intervention recommended other than admission to medicine for treatment of aspiration PNA 79 yo female who has a PMH of chronic back pain, GERD, anxiety, OP, hepatic cysts, LLE DVT not on AC per NH chart, displaced left intertrochanteric hip fracture, who resides at [facility] and was found on floor with blue lips and unresponsiveness, got 4 chest compressions. Yams suctioned from mouth and pt BIBA to  ED. Pt AAOx3 currently however states the last thing she remembers was eating then falling over. Pt originally placed on 2L NC for pox of 88% then switched to NRB with improvement of oxygen to 95%. GI and ICU consulted and no further intervention recommended other than admission to medicine for treatment of aspiration PNA

## 2023-12-01 NOTE — SWALLOW BEDSIDE ASSESSMENT ADULT - SWALLOW EVAL: RECOMMENDED DIET
SUGGEST A REGULAR TEXTURE DIET WITH THIN LIQUID CONSISTENCIES AS THE PATIENT APPEARED CLINICALLY TOLERANT OF THESE FOOD TEXTURES FROM AN OROPHARYNGEAL SWALLOWING PERSPECTIVE ON EXAM AND PT STATED THAT SHE WAS ON A REGULAR TEXTURE DIET W/THIN LIQUIDS PTH.

## 2023-12-01 NOTE — PROGRESS NOTE ADULT - ASSESSMENT
Pt is a 79 yo female who presents to ED BIBA, s/p aspiration of Yams followed by apneic episode, admitted due FOR:     # Acute  Hypoxic Respiratory failure due to  Aspiration PNA 2/2 choking episode   # Episode of Apnea,  s/p 4 compressions to chest at the facility, was suctioned by EMS  RVP/COVID negative  CT: Food in proximal esophagus  Aspiration precautions  F/u BCX  C/w Zosyn IV   Duonebs  Tylenol ORN for pain  Lidocain patch   Supplemental O2  to keep Pulse ox above 92%    # Food Impaction?   Imaging reviewed and D/w Dr Montanez, no food impaction, start clears and advance diet to Full liquid and then Mech soft   D/w S&S no evidence of oropharyngeal dysphagia   CT head and Cspine with no acute findings         # Abnormal UA, suspected  UTI  f/u urine culture  c/w IV abx    #Protein calorie malnutrition  nutrition consult    #Hyperglycemia, likely stress related   A1c: 5.6    ACP: MOLST for DNR/DNI, order placed, MOLST in chart      PT christina

## 2023-12-01 NOTE — SWALLOW BEDSIDE ASSESSMENT ADULT - SLP GENERAL OBSERVATIONS
On encounter, a cervical kyphosis was apparent. The pt was alert and interactive but somewhat anxious. She was able to verbalize during communicative probes without evidence of a primary motor speech or primary linguistic pathology. Though, it should be noted that several of her verbalizations were reflective of anxious thoughts. Pt was able to verbalize her needs nonetheless.

## 2023-12-01 NOTE — DIETITIAN INITIAL EVALUATION ADULT - PERTINENT LABORATORY DATA
12-01    144  |  112<H>  |  18  ----------------------------<  102<H>  3.4<L>   |  26  |  0.72    Ca    8.4<L>      01 Dec 2023 08:44    TPro  6.2  /  Alb  2.6<L>  /  TBili  0.8  /  DBili  x   /  AST  21  /  ALT  22  /  AlkPhos  71  12-01

## 2023-12-01 NOTE — PROGRESS NOTE ADULT - SUBJECTIVE AND OBJECTIVE BOX
Interval History:  No distress  MEDICATIONS  (STANDING):  heparin   Injectable 5000 Unit(s) SubCutaneous every 12 hours  piperacillin/tazobactam IVPB.. 3.375 Gram(s) IV Intermittent every 8 hours  sodium chloride 0.9%. 1000 milliLiter(s) (75 mL/Hr) IV Continuous <Continuous>    MEDICATIONS  (PRN):  albuterol/ipratropium for Nebulization 3 milliLiter(s) Nebulizer every 6 hours PRN Shortness of Breath and/or Wheezing  ondansetron Injectable 4 milliGRAM(s) IV Push every 8 hours PRN Nausea and/or Vomiting  polyethylene glycol 3350 17 Gram(s) Oral daily PRN Constipation      Daily     Daily   BMI: 21.4 (11-30 @ 16:36)  Change in Weight:  Vital Signs Last 24 Hrs  T(C): 36.9 (01 Dec 2023 08:59), Max: 37.2 (30 Nov 2023 21:23)  T(F): 98.4 (01 Dec 2023 08:59), Max: 98.9 (30 Nov 2023 21:23)  HR: 68 (01 Dec 2023 08:59) (68 - 80)  BP: 114/60 (01 Dec 2023 08:59) (102/58 - 117/59)  BP(mean): 75 (30 Nov 2023 21:23) (69 - 76)  RR: 18 (01 Dec 2023 08:59) (16 - 20)  SpO2: 100% (01 Dec 2023 08:59) (94% - 100%)    Parameters below as of 01 Dec 2023 08:59  Patient On (Oxygen Delivery Method): room air      I&O's Detail      PHYSICAL EXAM  Abdomen : Soft nontender    Lab Results:                        12.0   9.66  )-----------( 184      ( 01 Dec 2023 08:44 )             36.6     12-01    144  |  112<H>  |  18  ----------------------------<  102<H>  3.4<L>   |  26  |  0.72    Ca    8.4<L>      01 Dec 2023 08:44    TPro  6.2  /  Alb  2.6<L>  /  TBili  0.8  /  DBili  x   /  AST  21  /  ALT  22  /  AlkPhos  71  12-01    LIVER FUNCTIONS - ( 01 Dec 2023 08:44 )  Alb: 2.6 g/dL / Pro: 6.2 gm/dL / ALK PHOS: 71 U/L / ALT: 22 U/L / AST: 21 U/L / GGT: x           PT/INR - ( 01 Dec 2023 08:44 )   PT: 32.5 sec;   INR: 2.97 ratio         PTT - ( 01 Dec 2023 08:44 )  PTT:48.3 sec      Stool Results:          RADIOLOGY RESULTS:    SURGICAL PATHOLOGY:

## 2023-12-01 NOTE — DIETITIAN INITIAL EVALUATION ADULT - OTHER INFO
Pt is a 79 yo female who has a pmh/o chronic back pain, GERD, anxiety, OP, hepatic cysts, LLE DVT not on AC per NH chart, displaced left intertrochanteric hip fracture, who resides at [facility] and was found on floor with blue lips and unresponsiveness, got 4 chest compressions. Yams suctioned from mouth and pt BIBA to  ED. Pt AAOx3 currently however states the last thing she remembers was eating then falling over. Pt originally placed on 2L NC for pox of 88% then switched to NRB with improvement of oxygen to 95%.  Admit for asp PNA  Pt is a 79 yo female who has a pmh/o chronic back pain, GERD, anxiety, OP, hepatic cysts, LLE DVT not on AC per NH chart, displaced left intertrochanteric hip fracture, who resides at [facility] and was found on floor with blue lips and unresponsiveness, got 4 chest compressions. Yams suctioned from mouth and pt BIBA to  ED. Pt AAOx3 currently however states the last thing she remembers was eating then falling over. Pt originally placed on 2L NC for pox of 88% then switched to NRB with improvement of oxygen to 95%.  Admit for asp PNA     Is DNR/DNI; however, nutrition support NOT addressed in [facility] - Confirm goals of care regarding nutrition support. NPO since admit (x 1 day); ? difficulty swallowing and pending SLP consult. Reports UBW of 130# x ? time frame - feels weight is steadily going up; bed scale wt of 123# taken by RD on 12/1/23. Unable to identify any pertinent wt changes at this time. NFPE reveals mild-mod muscle/ fat wasting - appears thin, legs appear to be edematous, but no edema doc'd on flow sheet 12/1. Does not meet criteria for PCM at this time; however, pt is at HIGH RISK. C/w NPO restrictions at this time; ADAT as per SLP recommendations. Add ensure + HP shake BID (350kcal, 20g protein) when medically feasible. Maintain aspiration precautions, back of bed >35 degrees. See below for other recommendations.

## 2023-12-01 NOTE — SWALLOW BEDSIDE ASSESSMENT ADULT - ORAL PHASE
Bolus formation/transfer were mildly prolonged but mechanically functional for age without evidence of an overt oral motor focality. Pt cleared oral debris after age acceptable piecemeal deglutition.

## 2023-12-01 NOTE — PHYSICAL THERAPY INITIAL EVALUATION ADULT - NSPTDISCHREC_GEN_A_CORE
TBD based on progress with PT ,improved independence/confidence in performance of out of bed functional mobility skills,wean O2 as tolerated/Sub-acute Rehab/Home PT Cosentyx Counseling:  I discussed with the patient the risks of Cosentyx including but not limited to worsening of Crohn's disease, immunosuppression, allergic reactions and infections.  The patient understands that monitoring is required including a PPD at baseline and must alert us or the primary physician if symptoms of infection or other concerning signs are noted.

## 2023-12-01 NOTE — PROGRESS NOTE ADULT - SUBJECTIVE AND OBJECTIVE BOX
CC: Pneumonia due to infectious organism      HPI:  79 yo female who has a pmh/o chronic back pain, GERD, anxiety, OP, hepatic cysts, LLE DVT not on AC per NH chart, displaced left intertrochanteric hip fracture, who resides at [facility] and was found on floor with blue lips and unresponsiveness, got 4 chest compressions. Yams suctioned from mouth and pt BIBA to  ED. Pt AAOx3 currently however states the last thing she remembers was eating then falling over. Pt originally placed on 2L NC for pox of 88% then switched to NRB with improvement of oxygen to 95%. GI and ICU consulted and no further intervention recommended other than admission to medicine for treatment of aspiration PNA. On admitting exam pt with areas of ecchymoses but states she has no pain on palpation. Pt also noted to have dried yams around face, cleaned. Pt appears comfortable, now saturating well on Room air. States she has had difficulty swallowing in past.  (30 Nov 2023 18:58)    INTERVAL HPI/OVERNIGHT EVENTS:    Vital Signs Last 24 Hrs  T(C): 36.9 (01 Dec 2023 08:59), Max: 37.2 (30 Nov 2023 21:23)  T(F): 98.4 (01 Dec 2023 08:59), Max: 98.9 (30 Nov 2023 21:23)  HR: 68 (01 Dec 2023 08:59) (68 - 84)  BP: 114/60 (01 Dec 2023 08:59) (102/58 - 117/59)  BP(mean): 75 (30 Nov 2023 21:23) (69 - 76)  RR: 18 (01 Dec 2023 08:59) (16 - 22)  SpO2: 100% (01 Dec 2023 08:59) (85% - 100%)    Parameters below as of 01 Dec 2023 08:59  Patient On (Oxygen Delivery Method): room air          REVIEW OF SYSTEMS:    All other review of systems is negative unless indicated above.  PHYSICAL EXAM:    General: in no acute distress  Eyes: PERRLA, EOMI; conjunctiva and sclera clear  Head: Normocephalic; atraumatic  ENMT: No nasal discharge; airway clear  Neck: Supple; non tender; no masses  Respiratory: No wheezes, rales or rhonchi  Cardiovascular: Regular rate and rhythm. S1 and S2 Normal; No murmurs, gallops or rubs  Gastrointestinal: Soft non-tender non-distended; Normal bowel sounds  Genitourinary: No  suprapubic  tenderness  Extremities: Normal range of motion, No clubbing, cyanosis or edema  Vascular: Peripheral pulses palpable 2+ bilaterally  Neurological: Alert and oriented x4  Skin: Warm and dry. No acute rash  Lymph Nodes: No acute cervical adenopathy  Musculoskeletal: Normal muscle tone, without deformities  Psychiatric: Cooperative and appropriate                            12.0   9.66  )-----------( 184      ( 01 Dec 2023 08:44 )             36.6     01 Dec 2023 08:44    144    |  112    |  18     ----------------------------<  102    3.4     |  26     |  0.72     Ca    8.4        01 Dec 2023 08:44    TPro  6.2    /  Alb  2.6    /  TBili  0.8    /  DBili  x      /  AST  21     /  ALT  22     /  AlkPhos  71     01 Dec 2023 08:44    PT/INR - ( 01 Dec 2023 08:44 )   PT: 32.5 sec;   INR: 2.97 ratio         PTT - ( 01 Dec 2023 08:44 )  PTT:48.3 sec  CAPILLARY BLOOD GLUCOSE        LIVER FUNCTIONS - ( 01 Dec 2023 08:44 )  Alb: 2.6 g/dL / Pro: 6.2 gm/dL / ALK PHOS: 71 U/L / ALT: 22 U/L / AST: 21 U/L / GGT: x           Urinalysis Basic - ( 01 Dec 2023 08:44 )    Color: x / Appearance: x / SG: x / pH: x  Gluc: 102 mg/dL / Ketone: x  / Bili: x / Urobili: x   Blood: x / Protein: x / Nitrite: x   Leuk Esterase: x / RBC: x / WBC x   Sq Epi: x / Non Sq Epi: x / Bacteria: x        MEDICATIONS  (STANDING):  heparin   Injectable 5000 Unit(s) SubCutaneous every 12 hours  piperacillin/tazobactam IVPB.. 3.375 Gram(s) IV Intermittent every 8 hours    MEDICATIONS  (PRN):  albuterol/ipratropium for Nebulization 3 milliLiter(s) Nebulizer every 6 hours PRN Shortness of Breath and/or Wheezing  ondansetron Injectable 4 milliGRAM(s) IV Push every 8 hours PRN Nausea and/or Vomiting      RADIOLOGY & ADDITIONAL TESTS: CC: Pneumonia due to infectious organism      HPI:  77 yo female who has a pmh/o chronic back pain, GERD, anxiety, OP, hepatic cysts, LLE DVT not on AC per NH chart, displaced left intertrochanteric hip fracture, who resides at [facility] and was found on floor with blue lips and unresponsiveness, got 4 chest compressions. Yams suctioned from mouth and pt BIBA to  ED. Pt AAOx3 currently however states the last thing she remembers was eating then falling over. Pt originally placed on 2L NC for pox of 88% then switched to NRB with improvement of oxygen to 95%. GI and ICU consulted and no further intervention recommended other than admission to medicine for treatment of aspiration PNA. On admitting exam pt with areas of ecchymoses but states she has no pain on palpation. Pt also noted to have dried yams around face, cleaned. Pt appears comfortable, now saturating well on Room air. States she has had difficulty swallowing in past.      INTERVAL HPI/ OVERNIGHT EVENTS: Chart reviewed, Pt was seen and examined, awake and alert, confused not sure why in the hospital but then remembered that choked after reminding. Pt reports " breathing could be better", also c/p pain  L chest. remembers that fell recently few times     Vital Signs Last 24 Hrs  T(C): 36.9 (01 Dec 2023 08:59), Max: 37.2 (30 Nov 2023 21:23)  T(F): 98.4 (01 Dec 2023 08:59), Max: 98.9 (30 Nov 2023 21:23)  HR: 68 (01 Dec 2023 08:59) (68 - 84)  BP: 114/60 (01 Dec 2023 08:59) (102/58 - 117/59)  BP(mean): 75 (30 Nov 2023 21:23) (69 - 76)  RR: 18 (01 Dec 2023 08:59) (16 - 22)  SpO2: 100% (01 Dec 2023 08:59) (85% - 100%)    Parameters below as of 01 Dec 2023 08:59  Patient On (Oxygen Delivery Method): room air      REVIEW OF SYSTEMS:    All other review of systems is negative unless indicated above.  PHYSICAL EXAM:    General: in no acute distress  Eyes:  EOMI; conjunctiva and sclera clear  Head: Normocephalic; atraumatic  ENMT: No nasal discharge; airway clear  Respiratory: Decreased BS at bases, No wheezes or rhonchi  Chest : L chest wall bruise,  tenderness to palpation   Cardiovascular: Regular rate and rhythm. S1 and S2 Normal;   Gastrointestinal: Soft non-tender non-distended; Normal bowel sounds  Genitourinary: No  suprapubic  tenderness  Extremities: No  edema  Neurological: Alert and oriented x1, confused  non focal   Skin: Warm and dry. No acute rash  Lymph Nodes: No acute cervical adenopathy  Musculoskeletal: Normal muscle tone, without deformities. B/p LE bruises with scabbing. No Joint edema    Psychiatric: Cooperative and appropriate    LABS:                         12.0   9.66  )-----------( 184      ( 01 Dec 2023 08:44 )             36.6     01 Dec 2023 08:44    144    |  112    |  18     ----------------------------<  102    3.4     |  26     |  0.72     Ca    8.4        01 Dec 2023 08:44    TPro  6.2    /  Alb  2.6    /  TBili  0.8    /  DBili  x      /  AST  21     /  ALT  22     /  AlkPhos  71     01 Dec 2023 08:44  PT/INR - ( 01 Dec 2023 08:44 )   PT: 32.5 sec;   INR: 2.97 ratio    PTT - ( 01 Dec 2023 08:44 )  PTT:48.3 sec  LIVER FUNCTIONS - ( 01 Dec 2023 08:44 )  Alb: 2.6 g/dL / Pro: 6.2 gm/dL / ALK PHOS: 71 U/L / ALT: 22 U/L / AST: 21 U/L / GGT: x           Urinalysis Basic - ( 01 Dec 2023 08:44 )  Color: x / Appearance: x / SG: x / pH: x  Gluc: 102 mg/dL / Ketone: x  / Bili: x / Urobili: x   Blood: x / Protein: x / Nitrite: x   Leuk Esterase: x / RBC: x / WBC x   Sq Epi: x / Non Sq Epi: x / Bacteria: x        MEDICATIONS  (STANDING):  heparin   Injectable 5000 Unit(s) SubCutaneous every 12 hours  piperacillin/tazobactam IVPB.. 3.375 Gram(s) IV Intermittent every 8 hours    MEDICATIONS  (PRN):  albuterol/ipratropium for Nebulization 3 milliLiter(s) Nebulizer every 6 hours PRN Shortness of Breath and/or Wheezing  ondansetron Injectable 4 milliGRAM(s) IV Push every 8 hours PRN Nausea and/or Vomiting      RADIOLOGY & ADDITIONAL TESTS:    ACC: [id number] EXAM:  CT CERVICAL SPINE   ORDERED BY: PRIYANKA QUIÑONES     ACC: [id number] EXAM:  CT BRAIN   ORDERED BY: PRIYANKA QUIÑONES     PROCEDURE DATE:  11/30/2023          INTERPRETATION:  CT head without IV contrast    CLINICAL INFORMATION:  Fall   Intracranialhemorrhage.    TECHNIQUE: Contiguous axial 5 mm sections were obtained through the head.   Sagittal and coronal 2-D reformatted images were also obtained.   This   scan was performed using automatic exposure control (radiation dose   reduction software) to obtain a diagnostic image quality scan with   patient dose as low as reasonably achievable.    FINDINGS:   CT dated 12/18/2022 available for review.    The brain demonstrates mild periventricular white matter ischemia.   No   acute cerebral cortical infarct is seen.  No intracranial hemorrhage is   found.  No mass effect is found in the brain.    The ventricles, sulci and basal cisterns appear unremarkable.    The orbits are unremarkable.  The paranasal sinuses are clear.  The nasal   cavity appears intact.  The nasopharynx is symmetric.  The central skull   base, petrous temporal bones and calvarium remain intact.    CT cervical spine without IV contrast    CLINICAL INFORMATION: Fracture, trauma, neck pain.  Neck pain, spinal   stenosis, spondylosis. unresponsive and fall, food impaction    TECHNIQUE:  Contiguous axial 2.0 mm sections were obtained through the   cervical spine using a single helical acquisition.  Additional 2 mm   sagittal and coronal reformatted reconstructions of the spine were   obtained.  These additional reformatted images were used to evaluate the   spine for alignment, vertebral fractures and the integrity of the the   posterior elements.   This scan was performed using automatic exposure   control (radiation dose reduction software) to obtain a diagnostic image   quality scan with patient dose as low as reasonably achievable.    FINDINGS:   No prior similar studies are available for review    Cervical vertebral body heights are maintained. No vertebral fracture is   seen. No destructive bone lesion is found.  Alignment is preserved.    Facet joints appear intact and aligned.    Cervical intervertebral disc spaces show mild degenerative disc disease   and spondylosis at C3-4 through C6/7 withloss of disc height and   associated degenerative endplate changes. There is narrowing of the LEFT   C2-3, BILATERAL C3-4, C4-5, C5-6 and C6-7 neural foramina due to   uncovertebral spurring and facet osteophytic hypertrophy. Degenerative   ridge/disc complexes at C4-5 and C5-6 flatten the ventral thecal sac.    The skull base appears intact.  No neck mass is recognized.  Paraspinal   soft tissues appear intact. Visualized lymph nodes appear to be within   physiologic size limits.    Food impaction is seen within a dilated proximal esophagus.    IMPRESSION:    CT HEAD: mild periventricular white matter ischemia    CT cervical spine:   No vertebral fracture is recognized.  Mild   degenerative disc disease and spondylosis at C3-4 through C6/7 with loss   of disc height and associated degenerative endplate changes. There is   narrowing of the LEFT C2-3, BILATERAL C3-4, C4-5, C5-6 and C6-7 neural   foramina due to uncovertebral spurring and facet osteophytic hypertrophy.   Degenerative ridge/disc complexes at C4-5 and C5-6 flatten the ventral   thecal sac. Food impaction is seen within a dilated proximal esophagus.   Recommend CT chest with IV contrast) and oral contrast ingested   immediately prior to examination for further evaluation.        ACC: [id number] EXAM:  CT ABDOMEN AND PELVIS IC   ORDERED BY: PRIYANKA QUIÑONES     ACC: [id number] EXAM:  [facility]ST [facility]   ORDERED BY: PRIYANKA QUIÑONES     PROCEDURE DATE:  11/30/2023          INTERPRETATION:  CLINICAL INFORMATION: Hypoxia, abdominal pain    COMPARISON: CT chest abdomen and pelvis 12/18/2022    CONTRAST/COMPLICATIONS:  IV Contrast: 90 mm of Omnipaque 350  Oral Contrast: NONE  Complications: None reported at time of study completion    PROCEDURE:  CT Angiography of the Chest was performed followed by portal venous phase   imaging of the Abdomen and Pelvis.  Sagittal and coronal reformats were performed as well as 3D (MIP)   reconstructions.    FINDINGS:  CHEST:  LUNGS AND LARGE AIRWAYS: Patent central airways. Mucoid impactions and  consolidation of basilar left lower lobe. Bilateral tree-in-bud opacities   likely impacted distal airways/aspiration.  PLEURA: No pleural effusion.  VESSELS: No pulmonary embolus. Normal aorta.  HEART: Heart size is normal. No pericardial effusion. Mild coronary   calcification.  MEDIASTINUM AND BUNNY: No lymphadenopathy. Moderate retained ingested   material/food bolus within the esophagus.  CHEST WALL AND LOWER NECK: Within normal limits.    ABDOMEN AND PELVIS:  LIVER: Multiple liver cysts.  BILE DUCTS: Normal caliber.  GALLBLADDER: Within normal limits.  SPLEEN: Within normal limits.  PANCREAS: Within normal limits.  ADRENALS: Within normal limits.  KIDNEYS/URETERS: Multiple bilateral renal cysts.    BLADDER: Within normal limits.  REPRODUCTIVE ORGANS: Calcified fibroid.    BOWEL: No bowel obstruction. Appendix is normal.  PERITONEUM: No ascites.  VESSELS: Mild atherosclerosis.  RETROPERITONEUM/LYMPH NODES: No lymphadenopathy.  ABDOMINAL WALL: Within normal limits.  BONES: Multilevelcompression fracture of the vertebral bodies and   vertebroplasty. Left hip arthroplasty.    IMPRESSION:  No pulmonary embolus.    Mucoid impactions and consolidation of basilar left lower lobe. Bilateral   tree-in-bud opacities likely impacted distal airways/aspiration.    Moderate retained ingested material/food bolus within the esophagus.    No acute finding within abdomen and pelvis.

## 2023-12-01 NOTE — DIETITIAN INITIAL EVALUATION ADULT - ORAL INTAKE PTA/DIET HISTORY
Pt resides at [facility]. Normally w/ good appetite; however, appetite diminished x 2-3 days 2/2 abd pain, aspirated PTA. Unable to confirm if on a modified consistency diet; none indicated on transfer paper work. Consumes ensure occasionally.  Pt resides at [facility]. Normally w/ good appetite; however, appetite diminished x 2-3 days 2/2 abd pain, aspirated PTA. Unable to confirm if on a modified consistency diet; none indicated on transfer paper work. Consumes ensure occasionally. Allergies noted: Shellfish, Shrimp as per [place]

## 2023-12-01 NOTE — SWALLOW BEDSIDE ASSESSMENT ADULT - PHARYNGEAL PHASE
Swallow triggered in an acceptable time frame for age. Laryngeal lift on palpation during swallowing trials was very mildly decreased but felt to be functional for age as well. NO behavioral aspiration signs exhibited.

## 2023-12-01 NOTE — SWALLOW BEDSIDE ASSESSMENT ADULT - ASPIRATION PRECAUTIONS
MAINTAIN ASPIRATION PRECAUTIONS AS A CONSERVATIVE MEASURE. NOTE THAT WHILE MILD FUNCTIONAL PRESBYPHAGIA MAY PLACE PT AT A RELATIVELY HEIGHTENED RISK FOR EPISODIC ASPIRATION, SHE DID NOT APPEAR TO BE EXHIBITING ANY BEHAVIORAL ASPIRATION SIGNS ON EXAM./yes

## 2023-12-01 NOTE — PHYSICAL THERAPY INITIAL EVALUATION ADULT - PATIENT/FAMILY/SIGNIFICANT OTHER GOALS STATEMENT, PT EVAL
pt being very particular about kind of assistive device she will use on this encounter; insisted she was unable to use standard 2 wheeled RW , provided rollator but then com,plains she needs handgrips padded because her hands are very sensitive to pressure (I rolled and taped a sock around each  handle to provide cushioning as requested)

## 2023-12-01 NOTE — SWALLOW BEDSIDE ASSESSMENT ADULT - SWALLOW EVAL: DIAGNOSIS
1) Pt exhibits mild functional Oropharyngeal Presbyphagia with sufficient oropharyngeal swallowing preservation for age nonetheless. NO behavioral aspiration signs exhibited. No change in O2 sats noted. Of note is that food retention in Esophagus was reported upon recent chest imaging likely indicative of some level of Presbyesophagus as well. Note that despite relatively functional stable oropharyngeal swallow preservation for age at this time while she is alert, their is a reasonable chance that she episodically aspirated during her unresponsive episode at [facility]. Unclear if pt was s/p fall with LOC, syncope, or cardiac arrest at [facility]. In any case, pt's current prandial airway protection subjectively appears grossly functional for age on exam. 1) Pt exhibits mild functional Oropharyngeal Presbyphagia with sufficient oropharyngeal swallowing preservation for age nonetheless. NO behavioral aspiration signs exhibited. No change in O2 sats noted. Of note is that food retention in Esophagus was reported upon recent chest imaging likely indicative of some level of Presbyesophagus as well. Though, that she denied odynophagia or dyspepsia, & no emesis demonstrated on exam. Note that despite relatively functional stable oropharyngeal swallow preservation for age at this time while she is alert, their is a reasonable chance that she episodically aspirated during her unresponsive episode at [facility]. Unclear if pt was s/p fall with LOC, syncope, or cardiac arrest at [facility]. In any case, pt's current prandial airway protection subjectively appears grossly functional for age on exam in hospital at this time.

## 2023-12-01 NOTE — SWALLOW BEDSIDE ASSESSMENT ADULT - NS SPL SWALLOW CLINIC TRIAL FT
Pt exhibited mild functional Oropharyngeal Presbyphagia with sufficient oropharyngeal swallowing preservation for age nonetheless. NO behavioral aspiration signs exhibited. No change in O2 sats noted. Of note is that food retention in Esophagus was reported upon recent chest imaging likely indicative of some level of Presbyesophagus as well. Though, that she denied odynophagia or dyspepsia, & no emesis demonstrated on exam. Note that despite relatively functional stable oropharyngeal swallow preservation for age at this time while she is alert, their is a reasonable chance that she episodically aspirated during her unresponsive episode at [facility]. Unclear if pt was s/p fall with LOC, syncope, or cardiac arrest at [facility]. In any case, pt's current prandial airway protection subjectively appears grossly functional for age on exam in hospital at this time.

## 2023-12-02 LAB
ANION GAP SERPL CALC-SCNC: 4 MMOL/L — LOW (ref 5–17)
ANION GAP SERPL CALC-SCNC: 4 MMOL/L — LOW (ref 5–17)
BUN SERPL-MCNC: 15 MG/DL — SIGNIFICANT CHANGE UP (ref 7–23)
BUN SERPL-MCNC: 15 MG/DL — SIGNIFICANT CHANGE UP (ref 7–23)
CALCIUM SERPL-MCNC: 7.9 MG/DL — LOW (ref 8.5–10.1)
CALCIUM SERPL-MCNC: 7.9 MG/DL — LOW (ref 8.5–10.1)
CHLORIDE SERPL-SCNC: 113 MMOL/L — HIGH (ref 96–108)
CHLORIDE SERPL-SCNC: 113 MMOL/L — HIGH (ref 96–108)
CO2 SERPL-SCNC: 26 MMOL/L — SIGNIFICANT CHANGE UP (ref 22–31)
CO2 SERPL-SCNC: 26 MMOL/L — SIGNIFICANT CHANGE UP (ref 22–31)
CREAT SERPL-MCNC: 0.5 MG/DL — SIGNIFICANT CHANGE UP (ref 0.5–1.3)
CREAT SERPL-MCNC: 0.5 MG/DL — SIGNIFICANT CHANGE UP (ref 0.5–1.3)
EGFR: 97 ML/MIN/1.73M2 — SIGNIFICANT CHANGE UP
EGFR: 97 ML/MIN/1.73M2 — SIGNIFICANT CHANGE UP
GLUCOSE SERPL-MCNC: 111 MG/DL — HIGH (ref 70–99)
GLUCOSE SERPL-MCNC: 111 MG/DL — HIGH (ref 70–99)
HCT VFR BLD CALC: 31.4 % — LOW (ref 34.5–45)
HCT VFR BLD CALC: 31.4 % — LOW (ref 34.5–45)
HGB BLD-MCNC: 10.5 G/DL — LOW (ref 11.5–15.5)
HGB BLD-MCNC: 10.5 G/DL — LOW (ref 11.5–15.5)
INR BLD: 1.66 RATIO — HIGH (ref 0.85–1.18)
INR BLD: 1.66 RATIO — HIGH (ref 0.85–1.18)
MCHC RBC-ENTMCNC: 31.7 PG — SIGNIFICANT CHANGE UP (ref 27–34)
MCHC RBC-ENTMCNC: 31.7 PG — SIGNIFICANT CHANGE UP (ref 27–34)
MCHC RBC-ENTMCNC: 33.4 GM/DL — SIGNIFICANT CHANGE UP (ref 32–36)
MCHC RBC-ENTMCNC: 33.4 GM/DL — SIGNIFICANT CHANGE UP (ref 32–36)
MCV RBC AUTO: 94.9 FL — SIGNIFICANT CHANGE UP (ref 80–100)
MCV RBC AUTO: 94.9 FL — SIGNIFICANT CHANGE UP (ref 80–100)
PLATELET # BLD AUTO: 159 K/UL — SIGNIFICANT CHANGE UP (ref 150–400)
PLATELET # BLD AUTO: 159 K/UL — SIGNIFICANT CHANGE UP (ref 150–400)
POTASSIUM SERPL-MCNC: 3.4 MMOL/L — LOW (ref 3.5–5.3)
POTASSIUM SERPL-MCNC: 3.4 MMOL/L — LOW (ref 3.5–5.3)
POTASSIUM SERPL-SCNC: 3.4 MMOL/L — LOW (ref 3.5–5.3)
POTASSIUM SERPL-SCNC: 3.4 MMOL/L — LOW (ref 3.5–5.3)
PROTHROM AB SERPL-ACNC: 18.5 SEC — HIGH (ref 9.5–13)
PROTHROM AB SERPL-ACNC: 18.5 SEC — HIGH (ref 9.5–13)
RBC # BLD: 3.31 M/UL — LOW (ref 3.8–5.2)
RBC # BLD: 3.31 M/UL — LOW (ref 3.8–5.2)
RBC # FLD: 12.4 % — SIGNIFICANT CHANGE UP (ref 10.3–14.5)
RBC # FLD: 12.4 % — SIGNIFICANT CHANGE UP (ref 10.3–14.5)
SODIUM SERPL-SCNC: 143 MMOL/L — SIGNIFICANT CHANGE UP (ref 135–145)
SODIUM SERPL-SCNC: 143 MMOL/L — SIGNIFICANT CHANGE UP (ref 135–145)
WBC # BLD: 7.56 K/UL — SIGNIFICANT CHANGE UP (ref 3.8–10.5)
WBC # BLD: 7.56 K/UL — SIGNIFICANT CHANGE UP (ref 3.8–10.5)
WBC # FLD AUTO: 7.56 K/UL — SIGNIFICANT CHANGE UP (ref 3.8–10.5)
WBC # FLD AUTO: 7.56 K/UL — SIGNIFICANT CHANGE UP (ref 3.8–10.5)

## 2023-12-02 PROCEDURE — 99233 SBSQ HOSP IP/OBS HIGH 50: CPT

## 2023-12-02 PROCEDURE — 74176 CT ABD & PELVIS W/O CONTRAST: CPT | Mod: 26

## 2023-12-02 RX ORDER — POTASSIUM CHLORIDE 20 MEQ
40 PACKET (EA) ORAL EVERY 4 HOURS
Refills: 0 | Status: DISCONTINUED | OUTPATIENT
Start: 2023-12-02 | End: 2023-12-02

## 2023-12-02 RX ORDER — WARFARIN SODIUM 2.5 MG/1
2 TABLET ORAL DAILY
Refills: 0 | Status: DISCONTINUED | OUTPATIENT
Start: 2023-12-02 | End: 2023-12-04

## 2023-12-02 RX ORDER — POTASSIUM CHLORIDE 20 MEQ
40 PACKET (EA) ORAL EVERY 4 HOURS
Refills: 0 | Status: COMPLETED | OUTPATIENT
Start: 2023-12-02 | End: 2023-12-02

## 2023-12-02 RX ADMIN — PIPERACILLIN AND TAZOBACTAM 25 GRAM(S): 4; .5 INJECTION, POWDER, LYOPHILIZED, FOR SOLUTION INTRAVENOUS at 22:39

## 2023-12-02 RX ADMIN — Medication 3 MILLILITER(S): at 07:56

## 2023-12-02 RX ADMIN — PIPERACILLIN AND TAZOBACTAM 25 GRAM(S): 4; .5 INJECTION, POWDER, LYOPHILIZED, FOR SOLUTION INTRAVENOUS at 13:36

## 2023-12-02 RX ADMIN — Medication 3 MILLILITER(S): at 13:18

## 2023-12-02 RX ADMIN — HEPARIN SODIUM 5000 UNIT(S): 5000 INJECTION INTRAVENOUS; SUBCUTANEOUS at 22:40

## 2023-12-02 RX ADMIN — Medication 3 MILLILITER(S): at 21:15

## 2023-12-02 RX ADMIN — HEPARIN SODIUM 5000 UNIT(S): 5000 INJECTION INTRAVENOUS; SUBCUTANEOUS at 09:33

## 2023-12-02 RX ADMIN — Medication 40 MILLIEQUIVALENT(S): at 16:50

## 2023-12-02 RX ADMIN — PIPERACILLIN AND TAZOBACTAM 25 GRAM(S): 4; .5 INJECTION, POWDER, LYOPHILIZED, FOR SOLUTION INTRAVENOUS at 06:20

## 2023-12-02 RX ADMIN — WARFARIN SODIUM 2 MILLIGRAM(S): 2.5 TABLET ORAL at 22:40

## 2023-12-02 NOTE — PROGRESS NOTE ADULT - ASSESSMENT
Pt is a 77 yo female who presents to ED BIBA, s/p aspiration of Yams followed by apneic episode, admitted due FOR:     # Acute  Hypoxic Respiratory failure due to  Aspiration PNA 2/2 choking episode   # Episode of Apnea,  s/p 4 compressions to chest at the facility, was suctioned by EMS  RVP/COVID negative  CT: Food in proximal esophagus  Aspiration precautions  F/u BCX  C/w Zosyn IV   Duonebs  Tylenol ORN for pain  Lidocain patch   Supplemental O2  to keep Pulse ox above 92%    # Food Impaction?   Imaging reviewed and D/w Dr Montanez, no food impaction, start clears and advance diet to Full liquid and then Mech soft   D/w S&S no evidence of oropharyngeal dysphagia   CT head and Cspine with no acute findings         # Abnormal UA, suspected  UTI  f/u urine culture  c/w IV abx    #Protein calorie malnutrition  nutrition consult    #Hyperglycemia, likely stress related   A1c: 5.6    ACP: [facility] for DNR/DNI, order placed, [facility]OL[facility] in chart      PT christina  Pt is a 79 yo female who presents to ED BIBA, s/p aspiration of Yams followed by apneic episode, admitted due FOR:     # Acute  Hypoxic Respiratory failure due to  Aspiration PNA 2/2 choking episode   # Episode of Apnea,  s/p 4 compressions to chest at the facility, was suctioned by EMS  RVP/COVID negative  CT: Food in proximal esophagus  Aspiration precautions  F/u BCX: NGTD  C/w Zosyn IV   Duonebs  Tylenol ORN for pain  Lidocain patch   Supplemental O2  to keep Pulse ox above 92%    # Food Impaction?   Imaging reviewed and D/w Dr Montanez, no food impaction  On  full liquid diet, tolerates well, will advance further to  [facility] soft after CT   D/w S&S no evidence of oropharyngeal dysphagia   CT head and Cspine with no acute findings     Abd Pain with diarrhea  UTI? CT on admission  with no abn i/abd findings  CT abd/pelvis ordered stat   if continues with loose stools will send  CDIFF PCR and GI PCR    # Abnormal UA, suspected  UTI  f/u urine culture  c/w IV abx    #Protein calorie malnutrition  nutrition consult    #Hyperglycemia, likely stress related   A1c: 5.6      # Hypokalemia  Replace PO Potassium   Monitor     ACP: [facility] for DNR/DNI, order placed, [facility]OL[facility] in chart      PT eval     Dispo; IV Abxs, CT abd/pelvis, F/u UCX. Monitor for diarrhea

## 2023-12-02 NOTE — PROGRESS NOTE ADULT - SUBJECTIVE AND OBJECTIVE BOX
CC: Pneumonia due to infectious organism      HPI:  77 yo female who has a pmh/o chronic back pain, GERD, anxiety, OP, hepatic cysts, LLE DVT not on AC per NH chart, displaced left intertrochanteric hip fracture, who resides at [facility] and was found on floor with blue lips and unresponsiveness, got 4 chest compressions. Yams suctioned from mouth and pt BIBA to  ED. Pt AAOx3 currently however states the last thing she remembers was eating then falling over. Pt originally placed on 2L NC for pox of 88% then switched to NRB with improvement of oxygen to 95%. GI and ICU consulted and no further intervention recommended other than admission to medicine for treatment of aspiration PNA. On admitting exam pt with areas of ecchymoses but states she has no pain on palpation. Pt also noted to have dried yams around face, cleaned. Pt appears comfortable, now saturating well on Room air. States she has had difficulty swallowing in past.      INTERVAL HPI/ OVERNIGHT EVENTS: Pt was seen and examined,      Vital Signs Last 24 Hrs  T(C): 36.5 (02 Dec 2023 08:19), Max: 37.3 (01 Dec 2023 23:00)  T(F): 97.7 (02 Dec 2023 08:19), Max: 99.1 (01 Dec 2023 23:00)  HR: 72 (02 Dec 2023 08:19) (72 - 79)  BP: 126/66 (02 Dec 2023 08:19) (112/50 - 128/57)  RR: 18 (02 Dec 2023 08:19) (18 - 19)  SpO2: 99% (02 Dec 2023 08:19) (93% - 100%)    Parameters below as of 02 Dec 2023 08:19  Patient On (Oxygen Delivery Method): nasal cannula  O2 Flow (L/min): 3      REVIEW OF SYSTEMS:    All other review of systems is negative unless indicated above.  PHYSICAL EXAM:    General: in no acute distress  Eyes:  EOMI; conjunctiva and sclera clear  Head: Normocephalic; atraumatic  ENMT: No nasal discharge; airway clear  Respiratory: Decreased BS at bases, No wheezes or rhonchi  Chest : L chest wall bruise,  tenderness to palpation   Cardiovascular: Regular rate and rhythm. S1 and S2 Normal;   Gastrointestinal: Soft non-tender non-distended; Normal bowel sounds  Genitourinary: No  suprapubic  tenderness  Extremities: No  edema  Neurological: Alert and oriented x1, confused  non focal   Skin: Warm and dry. No acute rash  Lymph Nodes: No acute cervical adenopathy  Musculoskeletal: Normal muscle tone, without deformities. B/p LE bruises with scabbing. No Joint edema    Psychiatric: Cooperative and appropriate    LABS:                         10.5   7.56  )-----------( 159      ( 02 Dec 2023 05:36 )             31.4     12-02    143  |  113<H>  |  15  ----------------------------<  111<H>  3.4<L>   |  26  |  0.50    Ca    7.9<L>      02 Dec 2023 05:36    TPro  6.2  /  Alb  2.6<L>  /  TBili  0.8  /  DBili  x   /  AST  21  /  ALT  22  /  AlkPhos  71  12-01        LIVER FUNCTIONS - ( 01 Dec 2023 08:44 )  Alb: 2.6 g/dL / Pro: 6.2 gm/dL / ALK PHOS: 71 U/L / ALT: 22 U/L / AST: 21 U/L / GGT: x           PT/INR - ( 01 Dec 2023 08:44 )   PT: 32.5 sec;   INR: 2.97 ratio         PTT - ( 01 Dec 2023 08:44 )  PTT:48.3 sec  Urinalysis Basic - ( 02 Dec 2023 05:36 )    Color: x / Appearance: x / SG: x / pH: x  Gluc: 111 mg/dL / Ketone: x  / Bili: x / Urobili: x   Blood: x / Protein: x / Nitrite: x   Leuk Esterase: x / RBC: x / WBC x   Sq Epi: x / Non Sq Epi: x / Bacteria: x                                                        12.0   9.66  )-----------( 184      ( 01 Dec 2023 08:44 )             36.6     01 Dec 2023 08:44    144    |  112    |  18     ----------------------------<  102    3.4     |  26     |  0.72     Ca    8.4        01 Dec 2023 08:44    TPro  6.2    /  Alb  2.6    /  TBili  0.8    /  DBili  x      /  AST  21     /  ALT  22     /  AlkPhos  71     01 Dec 2023 08:44  PT/INR - ( 01 Dec 2023 08:44 )   PT: 32.5 sec;   INR: 2.97 ratio    PTT - ( 01 Dec 2023 08:44 )  PTT:48.3 sec  LIVER FUNCTIONS - ( 01 Dec 2023 08:44 )  Alb: 2.6 g/dL / Pro: 6.2 gm/dL / ALK PHOS: 71 U/L / ALT: 22 U/L / AST: 21 U/L / GGT: x           Urinalysis Basic - ( 01 Dec 2023 08:44 )  Color: x / Appearance: x / SG: x / pH: x  Gluc: 102 mg/dL / Ketone: x  / Bili: x / Urobili: x   Blood: x / Protein: x / Nitrite: x   Leuk Esterase: x / RBC: x / WBC x   Sq Epi: x / Non Sq Epi: x / Bacteria: x        MEDICATIONS  (STANDING):  heparin   Injectable 5000 Unit(s) SubCutaneous every 12 hours  piperacillin/tazobactam IVPB.. 3.375 Gram(s) IV Intermittent every 8 hours    MEDICATIONS  (PRN):  albuterol/ipratropium for Nebulization 3 milliLiter(s) Nebulizer every 6 hours PRN Shortness of Breath and/or Wheezing  ondansetron Injectable 4 milliGRAM(s) IV Push every 8 hours PRN Nausea and/or Vomiting      RADIOLOGY & ADDITIONAL TESTS:    ACC: [id number] EXAM:  CT CERVICAL SPINE   ORDERED BY: PRIYANKA QUIÑONES     ACC: [id number] EXAM:  CT BRAIN   ORDERED BY: PRIYANKA QUIÑONES     PROCEDURE DATE:  11/30/2023          INTERPRETATION:  CT head without IV contrast    CLINICAL INFORMATION:  Fall   Intracranialhemorrhage.    TECHNIQUE: Contiguous axial 5 mm sections were obtained through the head.   Sagittal and coronal 2-D reformatted images were also obtained.   This   scan was performed using automatic exposure control (radiation dose   reduction software) to obtain a diagnostic image quality scan with   patient dose as low as reasonably achievable.    FINDINGS:   CT dated 12/18/2022 available for review.    The brain demonstrates mild periventricular white matter ischemia.   No   acute cerebral cortical infarct is seen.  No intracranial hemorrhage is   found.  No mass effect is found in the brain.    The ventricles, sulci and basal cisterns appear unremarkable.    The orbits are unremarkable.  The paranasal sinuses are clear.  The nasal   cavity appears intact.  The nasopharynx is symmetric.  The central skull   base, petrous temporal bones and calvarium remain intact.    CT cervical spine without IV contrast    CLINICAL INFORMATION: Fracture, trauma, neck pain.  Neck pain, spinal   stenosis, spondylosis. unresponsive and fall, food impaction    TECHNIQUE:  Contiguous axial 2.0 mm sections were obtained through the   cervical spine using a single helical acquisition.  Additional 2 mm   sagittal and coronal reformatted reconstructions of the spine were   obtained.  These additional reformatted images were used to evaluate the   spine for alignment, vertebral fractures and the integrity of the the   posterior elements.   This scan was performed using automatic exposure   control (radiation dose reduction software) to obtain a diagnostic image   quality scan with patient dose as low as reasonably achievable.    FINDINGS:   No prior similar studies are available for review    Cervical vertebral body heights are maintained. No vertebral fracture is   seen. No destructive bone lesion is found.  Alignment is preserved.    Facet joints appear intact and aligned.    Cervical intervertebral disc spaces show mild degenerative disc disease   and spondylosis at C3-4 through C6/7 withloss of disc height and   associated degenerative endplate changes. There is narrowing of the LEFT   C2-3, BILATERAL C3-4, C4-5, C5-6 and C6-7 neural foramina due to   uncovertebral spurring and facet osteophytic hypertrophy. Degenerative   ridge/disc complexes at C4-5 and C5-6 flatten the ventral thecal sac.    The skull base appears intact.  No neck mass is recognized.  Paraspinal   soft tissues appear intact. Visualized lymph nodes appear to be within   physiologic size limits.    Food impaction is seen within a dilated proximal esophagus.    IMPRESSION:    CT HEAD: mild periventricular white matter ischemia    CT cervical spine:   No vertebral fracture is recognized.  Mild   degenerative disc disease and spondylosis at C3-4 through C6/7 with loss   of disc height and associated degenerative endplate changes. There is   narrowing of the LEFT C2-3, BILATERAL C3-4, C4-5, C5-6 and C6-7 neural   foramina due to uncovertebral spurring and facet osteophytic hypertrophy.   Degenerative ridge/disc complexes at C4-5 and C5-6 flatten the ventral   thecal sac. Food impaction is seen within a dilated proximal esophagus.   Recommend CT chest with IV contrast) and oral contrast ingested   immediately prior to examination for further evaluation.        ACC: [id number] EXAM:  CT ABDOMEN AND PELVIS IC   ORDERED BY: PRIYANKA QUIÑONES     ACC: [id number] EXAM:  CT ANGIO CHEST PULM ART WAWI   ORDERED BY: PRIYANKA QUIÑONES     PROCEDURE DATE:  11/30/2023          INTERPRETATION:  CLINICAL INFORMATION: Hypoxia, abdominal pain    COMPARISON: CT chest abdomen and pelvis 12/18/2022    CONTRAST/COMPLICATIONS:  IV Contrast: 90 mm of Omnipaque 350  Oral Contrast: NONE  Complications: None reported at time of study completion    PROCEDURE:  CT Angiography of the Chest was performed followed by portal venous phase   imaging of the Abdomen and Pelvis.  Sagittal and coronal reformats were performed as well as 3D (MIP)   reconstructions.    FINDINGS:  CHEST:  LUNGS AND LARGE AIRWAYS: Patent central airways. Mucoid impactions and  consolidation of basilar left lower lobe. Bilateral tree-in-bud opacities   likely impacted distal airways/aspiration.  PLEURA: No pleural effusion.  VESSELS: No pulmonary embolus. Normal aorta.  HEART: Heart size is normal. No pericardial effusion. Mild coronary   calcification.  MEDIASTINUM AND BUNNY: No lymphadenopathy. Moderate retained ingested   material/food bolus within the esophagus.  CHEST WALL AND LOWER NECK: Within normal limits.    ABDOMEN AND PELVIS:  LIVER: Multiple liver cysts.  BILE DUCTS: Normal caliber.  GALLBLADDER: Within normal limits.  SPLEEN: Within normal limits.  PANCREAS: Within normal limits.  ADRENALS: Within normal limits.  KIDNEYS/URETERS: Multiple bilateral renal cysts.    BLADDER: Within normal limits.  REPRODUCTIVE ORGANS: Calcified fibroid.    BOWEL: No bowel obstruction. Appendix is normal.  PERITONEUM: No ascites.  VESSELS: Mild atherosclerosis.  RETROPERITONEUM/LYMPH NODES: No lymphadenopathy.  ABDOMINAL WALL: Within normal limits.  BONES: Multilevelcompression fracture of the vertebral bodies and   vertebroplasty. Left hip arthroplasty.    IMPRESSION:  No pulmonary embolus.    Mucoid impactions and consolidation of basilar left lower lobe. Bilateral   tree-in-bud opacities likely impacted distal airways/aspiration.    Moderate retained ingested material/food bolus within the esophagus.    No acute finding within abdomen and pelvis.   CC: Pneumonia due to infectious organism      HPI:  77 yo female who has a pmh/o chronic back pain, GERD, anxiety, OP, hepatic cysts, LLE DVT not on AC per NH chart, displaced left intertrochanteric hip fracture, who resides at [facility] and was found on floor with blue lips and unresponsiveness, got 4 chest compressions. Yams suctioned from mouth and pt BIBA to  ED. Pt AAOx3 currently however states the last thing she remembers was eating then falling over. Pt originally placed on 2L NC for pox of 88% then switched to NRB with improvement of oxygen to 95%. GI and ICU consulted and no further intervention recommended other than admission to medicine for treatment of aspiration PNA. On admitting exam pt with areas of ecchymoses but states she has no pain on palpation. Pt also noted to have dried yams around face, cleaned. Pt appears comfortable, now saturating well on Room air. States she has had difficulty swallowing in past.      INTERVAL HPI/ OVERNIGHT EVENTS: Pt was seen and examined, awake and alert, pleasantly confused. C/o having abd pain and loose stools, unable to provide details. As per RN had loose stool x 3       Vital Signs Last 24 Hrs  T(C): 36.5 (02 Dec 2023 08:19), Max: 37.3 (01 Dec 2023 23:00)  T(F): 97.7 (02 Dec 2023 08:19), Max: 99.1 (01 Dec 2023 23:00)  HR: 72 (02 Dec 2023 08:19) (72 - 79)  BP: 126/66 (02 Dec 2023 08:19) (112/50 - 128/57)  RR: 18 (02 Dec 2023 08:19) (18 - 19)  SpO2: 99% (02 Dec 2023 08:19) (93% - 100%)    Parameters below as of 02 Dec 2023 08:19  Patient On (Oxygen Delivery Method): nasal cannula  O2 Flow (L/min): 3      REVIEW OF SYSTEMS:  All other review of systems is negative unless indicated above.      PHYSICAL EXAM:  General: frail elderly female, in no acute distress  Eyes:  EOMI; conjunctiva and sclera clear  Head: Normocephalic; atraumatic  ENMT: No nasal discharge; airway clear  Respiratory: Decreased BS at bases, No wheezes or rhonchi  Chest : L chest wall bruise,  tenderness to palpation   Cardiovascular: Regular rate and rhythm. S1 and S2 Normal;   Gastrointestinal: Soft, diffuse abd tenderness to palpation,  mildly distended; +  bowel sounds  Genitourinary: No  suprapubic  tenderness  Extremities: No  edema  Neurological: Alert and oriented x1-2, confused  non focal   Musculoskeletal: Normal muscle tone, without deformities. B/L LE bruises with scabbing. No Joint edema    Psychiatric: Cooperative    LABS:                         10.5   7.56  )-----------( 159      ( 02 Dec 2023 05:36 )             31.4     12-02    143  |  113<H>  |  15  ----------------------------<  111<H>  3.4<L>   |  26  |  0.50    Ca    7.9<L>      02 Dec 2023 05:36    TPro  6.2  /  Alb  2.6<L>  /  TBili  0.8  /  DBili  x   /  AST  21  /  ALT  22  /  AlkPhos  71  12-01        LIVER FUNCTIONS - ( 01 Dec 2023 08:44 )  Alb: 2.6 g/dL / Pro: 6.2 gm/dL / ALK PHOS: 71 U/L / ALT: 22 U/L / AST: 21 U/L / GGT: x           PT/INR - ( 01 Dec 2023 08:44 )   PT: 32.5 sec;   INR: 2.97 ratio         PTT - ( 01 Dec 2023 08:44 )  PTT:48.3 sec  Urinalysis Basic - ( 02 Dec 2023 05:36 )    Color: x / Appearance: x / SG: x / pH: x  Gluc: 111 mg/dL / Ketone: x  / Bili: x / Urobili: x   Blood: x / Protein: x / Nitrite: x   Leuk Esterase: x / RBC: x / WBC x   Sq Epi: x / Non Sq Epi: x / Bacteria: x                          12.0   9.66  )-----------( 184      ( 01 Dec 2023 08:44 )             36.6     01 Dec 2023 08:44    144    |  112    |  18     ----------------------------<  102    3.4     |  26     |  0.72     Ca    8.4        01 Dec 2023 08:44    TPro  6.2    /  Alb  2.6    /  TBili  0.8    /  DBili  x      /  AST  21     /  ALT  22     /  AlkPhos  71     01 Dec 2023 08:44  PT/INR - ( 01 Dec 2023 08:44 )   PT: 32.5 sec;   INR: 2.97 ratio    PTT - ( 01 Dec 2023 08:44 )  PTT:48.3 sec  LIVER FUNCTIONS - ( 01 Dec 2023 08:44 )  Alb: 2.6 g/dL / Pro: 6.2 gm/dL / ALK PHOS: 71 U/L / ALT: 22 U/L / AST: 21 U/L / GGT: x           Urinalysis Basic - ( 01 Dec 2023 08:44 )  Color: x / Appearance: x / SG: x / pH: x  Gluc: 102 mg/dL / Ketone: x  / Bili: x / Urobili: x   Blood: x / Protein: x / Nitrite: x   Leuk Esterase: x / RBC: x / WBC x   Sq Epi: x / Non Sq Epi: x / Bacteria: x        MEDICATIONS  (STANDING):  heparin   Injectable 5000 Unit(s) SubCutaneous every 12 hours  piperacillin/tazobactam IVPB.. 3.375 Gram(s) IV Intermittent every 8 hours    MEDICATIONS  (PRN):  albuterol/ipratropium for Nebulization 3 milliLiter(s) Nebulizer every 6 hours PRN Shortness of Breath and/or Wheezing  ondansetron Injectable 4 milliGRAM(s) IV Push every 8 hours PRN Nausea and/or Vomiting      RADIOLOGY & ADDITIONAL TESTS:    ACC: [id number] EXAM:  CT CERVICAL SPINE   ORDERED BY: PRIYANKA QUIÑONES     ACC: [id number] EXAM:  CT BRAIN   ORDERED BY: PRIYANKA QUIÑONES     PROCEDURE DATE:  11/30/2023          INTERPRETATION:  CT head without IV contrast    CLINICAL INFORMATION:  Fall   Intracranialhemorrhage.    TECHNIQUE: Contiguous axial 5 mm sections were obtained through the head.   Sagittal and coronal 2-D reformatted images were also obtained.   This   scan was performed using automatic exposure control (radiation dose   reduction software) to obtain a diagnostic image quality scan with   patient dose as low as reasonably achievable.    FINDINGS:   CT dated 12/18/2022 available for review.    The brain demonstrates mild periventricular white matter ischemia.   No   acute cerebral cortical infarct is seen.  No intracranial hemorrhage is   found.  No mass effect is found in the brain.    The ventricles, sulci and basal cisterns appear unremarkable.    The orbits are unremarkable.  The paranasal sinuses are clear.  The nasal   cavity appears intact.  The nasopharynx is symmetric.  The central skull   base, petrous temporal bones and calvarium remain intact.    CT cervical spine without IV contrast    CLINICAL INFORMATION: Fracture, trauma, neck pain.  Neck pain, spinal   stenosis, spondylosis. unresponsive and fall, food impaction    TECHNIQUE:  Contiguous axial 2.0 mm sections were obtained through the   cervical spine using a single helical acquisition.  Additional 2 mm   sagittal and coronal reformatted reconstructions of the spine were   obtained.  These additional reformatted images were used to evaluate the   spine for alignment, vertebral fractures and the integrity of the the   posterior elements.   This scan was performed using automatic exposure   control (radiation dose reduction software) to obtain a diagnostic image   quality scan with patient dose as low as reasonably achievable.    FINDINGS:   No prior similar studies are available for review    Cervical vertebral body heights are maintained. No vertebral fracture is   seen. No destructive bone lesion is found.  Alignment is preserved.    Facet joints appear intact and aligned.    Cervical intervertebral disc spaces show mild degenerative disc disease   and spondylosis at C3-4 through C6/7 withloss of disc height and   associated degenerative endplate changes. There is narrowing of the LEFT   C2-3, BILATERAL C3-4, C4-5, C5-6 and C6-7 neural foramina due to   uncovertebral spurring and facet osteophytic hypertrophy. Degenerative   ridge/disc complexes at C4-5 and C5-6 flatten the ventral thecal sac.    The skull base appears intact.  No neck mass is recognized.  Paraspinal   soft tissues appear intact. Visualized lymph nodes appear to be within   physiologic size limits.    Food impaction is seen within a dilated proximal esophagus.    IMPRESSION:    CT HEAD: mild periventricular white matter ischemia    CT cervical spine:   No vertebral fracture is recognized.  Mild   degenerative disc disease and spondylosis at C3-4 through C6/7 with loss   of disc height and associated degenerative endplate changes. There is   narrowing of the LEFT C2-3, BILATERAL C3-4, C4-5, C5-6 and C6-7 neural   foramina due to uncovertebral spurring and facet osteophytic hypertrophy.   Degenerative ridge/disc complexes at C4-5 and C5-6 flatten the ventral   thecal sac. Food impaction is seen within a dilated proximal esophagus.   Recommend CT chest with IV contrast) and oral contrast ingested   immediately prior to examination for further evaluation.        ACC: [id number] EXAM:  CT ABDOMEN AND PELVIS IC   ORDERED BY: PRIYANKA QUIÑONES     ACC: [id number] EXAM:  CT ANGIO CHEST PULM ART WAWIC   ORDERED BY: PRIYANKA QUIÑONES     PROCEDURE DATE:  11/30/2023          INTERPRETATION:  CLINICAL INFORMATION: Hypoxia, abdominal pain    COMPARISON: CT chest abdomen and pelvis 12/18/2022    CONTRAST/COMPLICATIONS:  IV Contrast: 90 mm of Omnipaque 350  Oral Contrast: NONE  Complications: None reported at time of study completion    PROCEDURE:  CT Angiography of the Chest was performed followed by portal venous phase   imaging of the Abdomen and Pelvis.  Sagittal and coronal reformats were performed as well as 3D (MIP)   reconstructions.    FINDINGS:  CHEST:  LUNGS AND LARGE AIRWAYS: Patent central airways. Mucoid impactions and  consolidation of basilar left lower lobe. Bilateral tree-in-bud opacities   likely impacted distal airways/aspiration.  PLEURA: No pleural effusion.  VESSELS: No pulmonary embolus. Normal aorta.  HEART: Heart size is normal. No pericardial effusion. Mild coronary   calcification.  MEDIASTINUM AND BUNNY: No lymphadenopathy. Moderate retained ingested   material/food bolus within the esophagus.  CHEST WALL AND LOWER NECK: Within normal limits.    ABDOMEN AND PELVIS:  LIVER: Multiple liver cysts.  BILE DUCTS: Normal caliber.  GALLBLADDER: Within normal limits.  SPLEEN: Within normal limits.  PANCREAS: Within normal limits.  ADRENALS: Within normal limits.  KIDNEYS/URETERS: Multiple bilateral renal cysts.    BLADDER: Within normal limits.  REPRODUCTIVE ORGANS: Calcified fibroid.    BOWEL: No bowel obstruction. Appendix is normal.  PERITONEUM: No ascites.  VESSELS: Mild atherosclerosis.  RETROPERITONEUM/LYMPH NODES: No lymphadenopathy.  ABDOMINAL WALL: Within normal limits.  BONES: Multilevelcompression fracture of the vertebral bodies and   vertebroplasty. Left hip arthroplasty.    IMPRESSION:  No pulmonary embolus.    Mucoid impactions and consolidation of basilar left lower lobe. Bilateral   tree-in-bud opacities likely impacted distal airways/aspiration.    Moderate retained ingested material/food bolus within the esophagus.    No acute finding within abdomen and pelvis.   Suturegard Retention Suture: 2-0 Nylon

## 2023-12-03 LAB
24R-OH-CALCIDIOL SERPL-MCNC: 41.2 NG/ML — SIGNIFICANT CHANGE UP (ref 30–80)
24R-OH-CALCIDIOL SERPL-MCNC: 41.2 NG/ML — SIGNIFICANT CHANGE UP (ref 30–80)
ANION GAP SERPL CALC-SCNC: 3 MMOL/L — LOW (ref 5–17)
ANION GAP SERPL CALC-SCNC: 3 MMOL/L — LOW (ref 5–17)
APTT BLD: 30.9 SEC — SIGNIFICANT CHANGE UP (ref 24.5–35.6)
APTT BLD: 30.9 SEC — SIGNIFICANT CHANGE UP (ref 24.5–35.6)
BUN SERPL-MCNC: 9 MG/DL — SIGNIFICANT CHANGE UP (ref 7–23)
BUN SERPL-MCNC: 9 MG/DL — SIGNIFICANT CHANGE UP (ref 7–23)
CALCIUM SERPL-MCNC: 8.3 MG/DL — LOW (ref 8.4–10.5)
CALCIUM SERPL-MCNC: 8.3 MG/DL — LOW (ref 8.4–10.5)
CALCIUM SERPL-MCNC: 8.5 MG/DL — SIGNIFICANT CHANGE UP (ref 8.5–10.1)
CALCIUM SERPL-MCNC: 8.5 MG/DL — SIGNIFICANT CHANGE UP (ref 8.5–10.1)
CHLORIDE SERPL-SCNC: 109 MMOL/L — HIGH (ref 96–108)
CHLORIDE SERPL-SCNC: 109 MMOL/L — HIGH (ref 96–108)
CO2 SERPL-SCNC: 29 MMOL/L — SIGNIFICANT CHANGE UP (ref 22–31)
CO2 SERPL-SCNC: 29 MMOL/L — SIGNIFICANT CHANGE UP (ref 22–31)
CREAT SERPL-MCNC: 0.54 MG/DL — SIGNIFICANT CHANGE UP (ref 0.5–1.3)
CREAT SERPL-MCNC: 0.54 MG/DL — SIGNIFICANT CHANGE UP (ref 0.5–1.3)
CULTURE RESULTS: SIGNIFICANT CHANGE UP
CULTURE RESULTS: SIGNIFICANT CHANGE UP
EGFR: 95 ML/MIN/1.73M2 — SIGNIFICANT CHANGE UP
EGFR: 95 ML/MIN/1.73M2 — SIGNIFICANT CHANGE UP
GLUCOSE SERPL-MCNC: 109 MG/DL — HIGH (ref 70–99)
GLUCOSE SERPL-MCNC: 109 MG/DL — HIGH (ref 70–99)
HCT VFR BLD CALC: 33.6 % — LOW (ref 34.5–45)
HCT VFR BLD CALC: 33.6 % — LOW (ref 34.5–45)
HGB BLD-MCNC: 11.3 G/DL — LOW (ref 11.5–15.5)
HGB BLD-MCNC: 11.3 G/DL — LOW (ref 11.5–15.5)
INR BLD: 1.51 RATIO — HIGH (ref 0.85–1.18)
INR BLD: 1.51 RATIO — HIGH (ref 0.85–1.18)
MAGNESIUM SERPL-MCNC: 2.3 MG/DL — SIGNIFICANT CHANGE UP (ref 1.6–2.6)
MAGNESIUM SERPL-MCNC: 2.3 MG/DL — SIGNIFICANT CHANGE UP (ref 1.6–2.6)
MCHC RBC-ENTMCNC: 31.9 PG — SIGNIFICANT CHANGE UP (ref 27–34)
MCHC RBC-ENTMCNC: 31.9 PG — SIGNIFICANT CHANGE UP (ref 27–34)
MCHC RBC-ENTMCNC: 33.6 GM/DL — SIGNIFICANT CHANGE UP (ref 32–36)
MCHC RBC-ENTMCNC: 33.6 GM/DL — SIGNIFICANT CHANGE UP (ref 32–36)
MCV RBC AUTO: 94.9 FL — SIGNIFICANT CHANGE UP (ref 80–100)
MCV RBC AUTO: 94.9 FL — SIGNIFICANT CHANGE UP (ref 80–100)
PHOSPHATE SERPL-MCNC: 2.8 MG/DL — SIGNIFICANT CHANGE UP (ref 2.5–4.5)
PHOSPHATE SERPL-MCNC: 2.8 MG/DL — SIGNIFICANT CHANGE UP (ref 2.5–4.5)
PLATELET # BLD AUTO: 180 K/UL — SIGNIFICANT CHANGE UP (ref 150–400)
PLATELET # BLD AUTO: 180 K/UL — SIGNIFICANT CHANGE UP (ref 150–400)
POTASSIUM SERPL-MCNC: 3.6 MMOL/L — SIGNIFICANT CHANGE UP (ref 3.5–5.3)
POTASSIUM SERPL-MCNC: 3.6 MMOL/L — SIGNIFICANT CHANGE UP (ref 3.5–5.3)
POTASSIUM SERPL-SCNC: 3.6 MMOL/L — SIGNIFICANT CHANGE UP (ref 3.5–5.3)
POTASSIUM SERPL-SCNC: 3.6 MMOL/L — SIGNIFICANT CHANGE UP (ref 3.5–5.3)
PROTHROM AB SERPL-ACNC: 16.9 SEC — HIGH (ref 9.5–13)
PROTHROM AB SERPL-ACNC: 16.9 SEC — HIGH (ref 9.5–13)
PTH-INTACT FLD-MCNC: 32 PG/ML — SIGNIFICANT CHANGE UP (ref 15–65)
PTH-INTACT FLD-MCNC: 32 PG/ML — SIGNIFICANT CHANGE UP (ref 15–65)
RBC # BLD: 3.54 M/UL — LOW (ref 3.8–5.2)
RBC # BLD: 3.54 M/UL — LOW (ref 3.8–5.2)
RBC # FLD: 12.1 % — SIGNIFICANT CHANGE UP (ref 10.3–14.5)
RBC # FLD: 12.1 % — SIGNIFICANT CHANGE UP (ref 10.3–14.5)
SODIUM SERPL-SCNC: 141 MMOL/L — SIGNIFICANT CHANGE UP (ref 135–145)
SODIUM SERPL-SCNC: 141 MMOL/L — SIGNIFICANT CHANGE UP (ref 135–145)
SPECIMEN SOURCE: SIGNIFICANT CHANGE UP
SPECIMEN SOURCE: SIGNIFICANT CHANGE UP
WBC # BLD: 5.58 K/UL — SIGNIFICANT CHANGE UP (ref 3.8–10.5)
WBC # BLD: 5.58 K/UL — SIGNIFICANT CHANGE UP (ref 3.8–10.5)
WBC # FLD AUTO: 5.58 K/UL — SIGNIFICANT CHANGE UP (ref 3.8–10.5)
WBC # FLD AUTO: 5.58 K/UL — SIGNIFICANT CHANGE UP (ref 3.8–10.5)

## 2023-12-03 PROCEDURE — 99233 SBSQ HOSP IP/OBS HIGH 50: CPT

## 2023-12-03 RX ORDER — VANCOMYCIN HCL 1 G
125 VIAL (EA) INTRAVENOUS EVERY 6 HOURS
Refills: 0 | Status: DISCONTINUED | OUTPATIENT
Start: 2023-12-03 | End: 2023-12-04

## 2023-12-03 RX ADMIN — PIPERACILLIN AND TAZOBACTAM 25 GRAM(S): 4; .5 INJECTION, POWDER, LYOPHILIZED, FOR SOLUTION INTRAVENOUS at 14:00

## 2023-12-03 RX ADMIN — Medication 3 MILLILITER(S): at 13:22

## 2023-12-03 RX ADMIN — PIPERACILLIN AND TAZOBACTAM 25 GRAM(S): 4; .5 INJECTION, POWDER, LYOPHILIZED, FOR SOLUTION INTRAVENOUS at 21:51

## 2023-12-03 RX ADMIN — Medication 125 MILLIGRAM(S): at 19:02

## 2023-12-03 RX ADMIN — HEPARIN SODIUM 5000 UNIT(S): 5000 INJECTION INTRAVENOUS; SUBCUTANEOUS at 21:52

## 2023-12-03 RX ADMIN — HEPARIN SODIUM 5000 UNIT(S): 5000 INJECTION INTRAVENOUS; SUBCUTANEOUS at 09:28

## 2023-12-03 RX ADMIN — Medication 3 MILLILITER(S): at 08:12

## 2023-12-03 RX ADMIN — Medication 3 MILLILITER(S): at 22:14

## 2023-12-03 RX ADMIN — PIPERACILLIN AND TAZOBACTAM 25 GRAM(S): 4; .5 INJECTION, POWDER, LYOPHILIZED, FOR SOLUTION INTRAVENOUS at 05:15

## 2023-12-03 RX ADMIN — WARFARIN SODIUM 2 MILLIGRAM(S): 2.5 TABLET ORAL at 22:24

## 2023-12-03 NOTE — PROGRESS NOTE ADULT - REASON FOR ADMISSION
Aspiration PNA complicated by hypoxia

## 2023-12-03 NOTE — PROGRESS NOTE ADULT - SUBJECTIVE AND OBJECTIVE BOX
CC: Pneumonia due to infectious organism      HPI:  79 yo female who has a pmh/o chronic back pain, GERD, anxiety, OP, hepatic cysts, LLE DVT not on AC per NH chart, displaced left intertrochanteric hip fracture, who resides at [facility] and was found on floor with blue lips and unresponsiveness, got 4 chest compressions. Yams suctioned from mouth and pt BIBA to  ED. Pt AAOx3 currently however states the last thing she remembers was eating then falling over. Pt originally placed on 2L NC for pox of 88% then switched to NRB with improvement of oxygen to 95%. GI and ICU consulted and no further intervention recommended other than admission to medicine for treatment of aspiration PNA. On admitting exam pt with areas of ecchymoses but states she has no pain on palpation. Pt also noted to have dried yams around face, cleaned. Pt appears comfortable, now saturating well on Room air. States she has had difficulty swallowing in past.      INTERVAL HPI/ OVERNIGHT EVENTS: Pt was seen and examined,       Vital Signs Last 24 Hrs  T(C): 36.4 (03 Dec 2023 08:46), Max: 37.2 (02 Dec 2023 16:42)  T(F): 97.5 (03 Dec 2023 08:46), Max: 99 (02 Dec 2023 16:42)  HR: 62 (03 Dec 2023 08:46) (62 - 75)  BP: 130/66 (03 Dec 2023 08:46) (124/64 - 130/66)  RR: 18 (03 Dec 2023 08:46) (18 - 18)  SpO2: 100% (03 Dec 2023 08:46) (97% - 100%)    Parameters below as of 03 Dec 2023 08:46  Patient On (Oxygen Delivery Method): nasal cannula  O2 Flow (L/min): 2      REVIEW OF SYSTEMS:  All other review of systems is negative unless indicated above.      PHYSICAL EXAM:  General: frail elderly female, in no acute distress  Eyes:  EOMI; conjunctiva and sclera clear  Head: Normocephalic; atraumatic  ENMT: No nasal discharge; airway clear  Respiratory: Decreased BS at bases, No wheezes or rhonchi  Chest : L chest wall bruise,  tenderness to palpation   Cardiovascular: Regular rate and rhythm. S1 and S2 Normal;   Gastrointestinal: Soft, diffuse abd tenderness to palpation,  mildly distended; +  bowel sounds  Genitourinary: No  suprapubic  tenderness  Extremities: No  edema  Neurological: Alert and oriented x1-2, confused  non focal   Musculoskeletal: Normal muscle tone, without deformities. B/L LE bruises with scabbing. No Joint edema    Psychiatric: Cooperative    LABS:                         10.5   7.56  )-----------( 159      ( 02 Dec 2023 05:36 )             31.4     12-02    143  |  113<H>  |  15  ----------------------------<  111<H>  3.4<L>   |  26  |  0.50    Ca    7.9<L>      02 Dec 2023 05:36    TPro  6.2  /  Alb  2.6<L>  /  TBili  0.8  /  DBili  x   /  AST  21  /  ALT  22  /  AlkPhos  71  12-01        LIVER FUNCTIONS - ( 01 Dec 2023 08:44 )  Alb: 2.6 g/dL / Pro: 6.2 gm/dL / ALK PHOS: 71 U/L / ALT: 22 U/L / AST: 21 U/L / GGT: x           PT/INR - ( 01 Dec 2023 08:44 )   PT: 32.5 sec;   INR: 2.97 ratio         PTT - ( 01 Dec 2023 08:44 )  PTT:48.3 sec  Urinalysis Basic - ( 02 Dec 2023 05:36 )    Color: x / Appearance: x / SG: x / pH: x  Gluc: 111 mg/dL / Ketone: x  / Bili: x / Urobili: x   Blood: x / Protein: x / Nitrite: x   Leuk Esterase: x / RBC: x / WBC x   Sq Epi: x / Non Sq Epi: x / Bacteria: x                          12.0   9.66  )-----------( 184      ( 01 Dec 2023 08:44 )             36.6     01 Dec 2023 08:44    144    |  112    |  18     ----------------------------<  102    3.4     |  26     |  0.72     Ca    8.4        01 Dec 2023 08:44    TPro  6.2    /  Alb  2.6    /  TBili  0.8    /  DBili  x      /  AST  21     /  ALT  22     /  AlkPhos  71     01 Dec 2023 08:44  PT/INR - ( 01 Dec 2023 08:44 )   PT: 32.5 sec;   INR: 2.97 ratio    PTT - ( 01 Dec 2023 08:44 )  PTT:48.3 sec  LIVER FUNCTIONS - ( 01 Dec 2023 08:44 )  Alb: 2.6 g/dL / Pro: 6.2 gm/dL / ALK PHOS: 71 U/L / ALT: 22 U/L / AST: 21 U/L / GGT: x           Urinalysis Basic - ( 01 Dec 2023 08:44 )  Color: x / Appearance: x / SG: x / pH: x  Gluc: 102 mg/dL / Ketone: x  / Bili: x / Urobili: x   Blood: x / Protein: x / Nitrite: x   Leuk Esterase: x / RBC: x / WBC x   Sq Epi: x / Non Sq Epi: x / Bacteria: x        MEDICATIONS  (STANDING):  heparin   Injectable 5000 Unit(s) SubCutaneous every 12 hours  piperacillin/tazobactam IVPB.. 3.375 Gram(s) IV Intermittent every 8 hours    MEDICATIONS  (PRN):  albuterol/ipratropium for Nebulization 3 milliLiter(s) Nebulizer every 6 hours PRN Shortness of Breath and/or Wheezing  ondansetron Injectable 4 milliGRAM(s) IV Push every 8 hours PRN Nausea and/or Vomiting      RADIOLOGY & ADDITIONAL TESTS:    ACC: [id number] EXAM:  CT CERVICAL SPINE   ORDERED BY: PRIYANKA QUIÑONES     ACC: [id number] EXAM:  CT BRAIN   ORDERED BY: PRIYANKA QUIÑONES     PROCEDURE DATE:  11/30/2023          INTERPRETATION:  CT head without IV contrast    CLINICAL INFORMATION:  Fall   Intracranialhemorrhage.    TECHNIQUE: Contiguous axial 5 mm sections were obtained through the head.   Sagittal and coronal 2-D reformatted images were also obtained.   This   scan was performed using automatic exposure control (radiation dose   reduction software) to obtain a diagnostic image quality scan with   patient dose as low as reasonably achievable.    FINDINGS:   CT dated 12/18/2022 available for review.    The brain demonstrates mild periventricular white matter ischemia.   No   acute cerebral cortical infarct is seen.  No intracranial hemorrhage is   found.  No mass effect is found in the brain.    The ventricles, sulci and basal cisterns appear unremarkable.    The orbits are unremarkable.  The paranasal sinuses are clear.  The nasal   cavity appears intact.  The nasopharynx is symmetric.  The central skull   base, petrous temporal bones and calvarium remain intact.    CT cervical spine without IV contrast    CLINICAL INFORMATION: Fracture, trauma, neck pain.  Neck pain, spinal   stenosis, spondylosis. unresponsive and fall, food impaction    TECHNIQUE:  Contiguous axial 2.0 mm sections were obtained through the   cervical spine using a single helical acquisition.  Additional 2 mm   sagittal and coronal reformatted reconstructions of the spine were   obtained.  These additional reformatted images were used to evaluate the   spine for alignment, vertebral fractures and the integrity of the the   posterior elements.   This scan was performed using automatic exposure   control (radiation dose reduction software) to obtain a diagnostic image   quality scan with patient dose as low as reasonably achievable.    FINDINGS:   No prior similar studies are available for review    Cervical vertebral body heights are maintained. No vertebral fracture is   seen. No destructive bone lesion is found.  Alignment is preserved.    Facet joints appear intact and aligned.    Cervical intervertebral disc spaces show mild degenerative disc disease   and spondylosis at C3-4 through C6/7 withloss of disc height and   associated degenerative endplate changes. There is narrowing of the LEFT   C2-3, BILATERAL C3-4, C4-5, C5-6 and C6-7 neural foramina due to   uncovertebral spurring and facet osteophytic hypertrophy. Degenerative   ridge/disc complexes at C4-5 and C5-6 flatten the ventral thecal sac.    The skull base appears intact.  No neck mass is recognized.  Paraspinal   soft tissues appear intact. Visualized lymph nodes appear to be within   physiologic size limits.    Food impaction is seen within a dilated proximal esophagus.    IMPRESSION:    CT HEAD: mild periventricular white matter ischemia    CT cervical spine:   No vertebral fracture is recognized.  Mild   degenerative disc disease and spondylosis at C3-4 through C6/7 with loss   of disc height and associated degenerative endplate changes. There is   narrowing of the LEFT C2-3, BILATERAL C3-4, C4-5, C5-6 and C6-7 neural   foramina due to uncovertebral spurring and facet osteophytic hypertrophy.   Degenerative ridge/disc complexes at C4-5 and C5-6 flatten the ventral   thecal sac. Food impaction is seen within a dilated proximal esophagus.   Recommend CT chest with IV contrast) and oral contrast ingested   immediately prior to examination for further evaluation.        ACC: [id number] EXAM:  CT ABDOMEN AND PELVIS IC   ORDERED BY: PRIYANKA QUIÑONES     ACC: [id number] EXAM:  CT ANGIO CHEST PULM ART WAWIC   ORDERED BY: PRIYANKA QUIÑONES     PROCEDURE DATE:  11/30/2023          INTERPRETATION:  CLINICAL INFORMATION: Hypoxia, abdominal pain    COMPARISON: CT chest abdomen and pelvis 12/18/2022    CONTRAST/COMPLICATIONS:  IV Contrast: 90 mm of Omnipaque 350  Oral Contrast: NONE  Complications: None reported at time of study completion    PROCEDURE:  CT Angiography of the Chest was performed followed by portal venous phase   imaging of the Abdomen and Pelvis.  Sagittal and coronal reformats were performed as well as 3D (MIP)   reconstructions.    FINDINGS:  CHEST:  LUNGS AND LARGE AIRWAYS: Patent central airways. Mucoid impactions and  consolidation of basilar left lower lobe. Bilateral tree-in-bud opacities   likely impacted distal airways/aspiration.  PLEURA: No pleural effusion.  VESSELS: No pulmonary embolus. Normal aorta.  HEART: Heart size is normal. No pericardial effusion. Mild coronary   calcification.  MEDIASTINUM AND BUNNY: No lymphadenopathy. Moderate retained ingested   material/food bolus within the esophagus.  CHEST WALL AND LOWER NECK: Within normal limits.    ABDOMEN AND PELVIS:  LIVER: Multiple liver cysts.  BILE DUCTS: Normal caliber.  GALLBLADDER: Within normal limits.  SPLEEN: Within normal limits.  PANCREAS: Within normal limits.  ADRENALS: Within normal limits.  KIDNEYS/URETERS: Multiple bilateral renal cysts.    BLADDER: Within normal limits.  REPRODUCTIVE ORGANS: Calcified fibroid.    BOWEL: No bowel obstruction. Appendix is normal.  PERITONEUM: No ascites.  VESSELS: Mild atherosclerosis.  RETROPERITONEUM/LYMPH NODES: No lymphadenopathy.  ABDOMINAL WALL: Within normal limits.  BONES: Multilevelcompression fracture of the vertebral bodies and   vertebroplasty. Left hip arthroplasty.    IMPRESSION:  No pulmonary embolus.    Mucoid impactions and consolidation of basilar left lower lobe. Bilateral   tree-in-bud opacities likely impacted distal airways/aspiration.    Moderate retained ingested material/food bolus within the esophagus.    No acute finding within abdomen and pelvis.   CC: Pneumonia due to infectious organism      HPI:  77 yo female who has a pmh/o chronic back pain, GERD, anxiety, OP, hepatic cysts, LLE DVT not on AC per NH chart, displaced left intertrochanteric hip fracture, who resides at [facility] and was found on floor with blue lips and unresponsiveness, got 4 chest compressions. Yams suctioned from mouth and pt BIBA to  ED. Pt AAOx3 currently however states the last thing she remembers was eating then falling over. Pt originally placed on 2L NC for pox of 88% then switched to NRB with improvement of oxygen to 95%. GI and ICU consulted and no further intervention recommended other than admission to medicine for treatment of aspiration PNA. On admitting exam pt with areas of ecchymoses but states she has no pain on palpation. Pt also noted to have dried yams around face, cleaned. Pt appears comfortable, now saturating well on Room air. States she has had difficulty swallowing in past.      INTERVAL HPI/ OVERNIGHT EVENTS: Pt was seen and examined,       Vital Signs Last 24 Hrs  T(C): 36.4 (03 Dec 2023 08:46), Max: 37.2 (02 Dec 2023 16:42)  T(F): 97.5 (03 Dec 2023 08:46), Max: 99 (02 Dec 2023 16:42)  HR: 62 (03 Dec 2023 08:46) (62 - 75)  BP: 130/66 (03 Dec 2023 08:46) (124/64 - 130/66)  RR: 18 (03 Dec 2023 08:46) (18 - 18)  SpO2: 100% (03 Dec 2023 08:46) (97% - 100%)    Parameters below as of 03 Dec 2023 08:46  Patient On (Oxygen Delivery Method): nasal cannula  O2 Flow (L/min): 2      REVIEW OF SYSTEMS:  All other review of systems is negative unless indicated above.      PHYSICAL EXAM:  General: frail elderly female, in no acute distress  Eyes:  EOMI; conjunctiva and sclera clear  Head: Normocephalic; atraumatic  ENMT: No nasal discharge; airway clear  Respiratory: Decreased BS at bases, No wheezes or rhonchi  Chest : L chest wall bruise,  tenderness to palpation   Cardiovascular: Regular rate and rhythm. S1 and S2 Normal;   Gastrointestinal: Soft, diffuse abd tenderness to palpation,  mildly distended; +  bowel sounds  Genitourinary: No  suprapubic  tenderness  Extremities: No  edema  Neurological: Alert and oriented x1-2, confused  non focal   Musculoskeletal: Normal muscle tone, without deformities. B/L LE bruises with scabbing. No Joint edema    Psychiatric: Cooperative    LABS:                         10.5   7.56  )-----------( 159      ( 02 Dec 2023 05:36 )             31.4     12-02    143  |  113<H>  |  15  ----------------------------<  111<H>  3.4<L>   |  26  |  0.50    Ca    7.9<L>      02 Dec 2023 05:36    TPro  6.2  /  Alb  2.6<L>  /  TBili  0.8  /  DBili  x   /  AST  21  /  ALT  22  /  AlkPhos  71  12-01        LIVER FUNCTIONS - ( 01 Dec 2023 08:44 )  Alb: 2.6 g/dL / Pro: 6.2 gm/dL / ALK PHOS: 71 U/L / ALT: 22 U/L / AST: 21 U/L / GGT: x           PT/INR - ( 01 Dec 2023 08:44 )   PT: 32.5 sec;   INR: 2.97 ratio         PTT - ( 01 Dec 2023 08:44 )  PTT:48.3 sec  Urinalysis Basic - ( 02 Dec 2023 05:36 )    Color: x / Appearance: x / SG: x / pH: x  Gluc: 111 mg/dL / Ketone: x  / Bili: x / Urobili: x   Blood: x / Protein: x / Nitrite: x   Leuk Esterase: x / RBC: x / WBC x   Sq Epi: x / Non Sq Epi: x / Bacteria: x                          12.0   9.66  )-----------( 184      ( 01 Dec 2023 08:44 )             36.6     01 Dec 2023 08:44    144    |  112    |  18     ----------------------------<  102    3.4     |  26     |  0.72     Ca    8.4        01 Dec 2023 08:44    TPro  6.2    /  Alb  2.6    /  TBili  0.8    /  DBili  x      /  AST  21     /  ALT  22     /  AlkPhos  71     01 Dec 2023 08:44  PT/INR - ( 01 Dec 2023 08:44 )   PT: 32.5 sec;   INR: 2.97 ratio    PTT - ( 01 Dec 2023 08:44 )  PTT:48.3 sec  LIVER FUNCTIONS - ( 01 Dec 2023 08:44 )  Alb: 2.6 g/dL / Pro: 6.2 gm/dL / ALK PHOS: 71 U/L / ALT: 22 U/L / AST: 21 U/L / GGT: x           Urinalysis Basic - ( 01 Dec 2023 08:44 )  Color: x / Appearance: x / SG: x / pH: x  Gluc: 102 mg/dL / Ketone: x  / Bili: x / Urobili: x   Blood: x / Protein: x / Nitrite: x   Leuk Esterase: x / RBC: x / WBC x   Sq Epi: x / Non Sq Epi: x / Bacteria: x        MEDICATIONS  (STANDING):  heparin   Injectable 5000 Unit(s) SubCutaneous every 12 hours  piperacillin/tazobactam IVPB.. 3.375 Gram(s) IV Intermittent every 8 hours    MEDICATIONS  (PRN):  albuterol/ipratropium for Nebulization 3 milliLiter(s) Nebulizer every 6 hours PRN Shortness of Breath and/or Wheezing  ondansetron Injectable 4 milliGRAM(s) IV Push every 8 hours PRN Nausea and/or Vomiting      RADIOLOGY & ADDITIONAL TESTS:    ACC: [id number] EXAM:  CT CERVICAL SPINE   ORDERED BY: PRIYANKA QUIÑONES     ACC: [id number] EXAM:  CT BRAIN   ORDERED BY: PRIYANKA QUIÑONES     PROCEDURE DATE:  11/30/2023          INTERPRETATION:  CT head without IV contrast    CLINICAL INFORMATION:  Fall   Intracranialhemorrhage.    TECHNIQUE: Contiguous axial 5 mm sections were obtained through the head.   Sagittal and coronal 2-D reformatted images were also obtained.   This   scan was performed using automatic exposure control (radiation dose   reduction software) to obtain a diagnostic image quality scan with   patient dose as low as reasonably achievable.    FINDINGS:   CT dated 12/18/2022 available for review.    The brain demonstrates mild periventricular white matter ischemia.   No   acute cerebral cortical infarct is seen.  No intracranial hemorrhage is   found.  No mass effect is found in the brain.    The ventricles, sulci and basal cisterns appear unremarkable.    The orbits are unremarkable.  The paranasal sinuses are clear.  The nasal   cavity appears intact.  The nasopharynx is symmetric.  The central skull   base, petrous temporal bones and calvarium remain intact.    CT cervical spine without IV contrast    CLINICAL INFORMATION: Fracture, trauma, neck pain.  Neck pain, spinal   stenosis, spondylosis. unresponsive and fall, food impaction    TECHNIQUE:  Contiguous axial 2.0 mm sections were obtained through the   cervical spine using a single helical acquisition.  Additional 2 mm   sagittal and coronal reformatted reconstructions of the spine were   obtained.  These additional reformatted images were used to evaluate the   spine for alignment, vertebral fractures and the integrity of the the   posterior elements.   This scan was performed using automatic exposure   control (radiation dose reduction software) to obtain a diagnostic image   quality scan with patient dose as low as reasonably achievable.    FINDINGS:   No prior similar studies are available for review    Cervical vertebral body heights are maintained. No vertebral fracture is   seen. No destructive bone lesion is found.  Alignment is preserved.    Facet joints appear intact and aligned.    Cervical intervertebral disc spaces show mild degenerative disc disease   and spondylosis at C3-4 through C6/7 withloss of disc height and   associated degenerative endplate changes. There is narrowing of the LEFT   C2-3, BILATERAL C3-4, C4-5, C5-6 and C6-7 neural foramina due to   uncovertebral spurring and facet osteophytic hypertrophy. Degenerative   ridge/disc complexes at C4-5 and C5-6 flatten the ventral thecal sac.    The skull base appears intact.  No neck mass is recognized.  Paraspinal   soft tissues appear intact. Visualized lymph nodes appear to be within   physiologic size limits.    Food impaction is seen within a dilated proximal esophagus.    IMPRESSION:    CT HEAD: mild periventricular white matter ischemia    CT cervical spine:   No vertebral fracture is recognized.  Mild   degenerative disc disease and spondylosis at C3-4 through C6/7 with loss   of disc height and associated degenerative endplate changes. There is   narrowing of the LEFT C2-3, BILATERAL C3-4, C4-5, C5-6 and C6-7 neural   foramina due to uncovertebral spurring and facet osteophytic hypertrophy.   Degenerative ridge/disc complexes at C4-5 and C5-6 flatten the ventral   thecal sac. Food impaction is seen within a dilated proximal esophagus.   Recommend CT chest with IV contrast) and oral contrast ingested   immediately prior to examination for further evaluation.        ACC: [id number] EXAM:  CT ABDOMEN AND PELVIS IC   ORDERED BY: PRIYANKA QUIÑONES     ACC: [id number] EXAM:  CT ANGIO CHEST PULM ART WAWIC   ORDERED BY: PRIYANKA QUIÑONES     PROCEDURE DATE:  11/30/2023          INTERPRETATION:  CLINICAL INFORMATION: Hypoxia, abdominal pain    COMPARISON: CT chest abdomen and pelvis 12/18/2022    CONTRAST/COMPLICATIONS:  IV Contrast: 90 mm of Omnipaque 350  Oral Contrast: NONE  Complications: None reported at time of study completion    PROCEDURE:  CT Angiography of the Chest was performed followed by portal venous phase   imaging of the Abdomen and Pelvis.  Sagittal and coronal reformats were performed as well as 3D (MIP)   reconstructions.    FINDINGS:  CHEST:  LUNGS AND LARGE AIRWAYS: Patent central airways. Mucoid impactions and  consolidation of basilar left lower lobe. Bilateral tree-in-bud opacities   likely impacted distal airways/aspiration.  PLEURA: No pleural effusion.  VESSELS: No pulmonary embolus. Normal aorta.  HEART: Heart size is normal. No pericardial effusion. Mild coronary   calcification.  MEDIASTINUM AND BUNNY: No lymphadenopathy. Moderate retained ingested   material/food bolus within the esophagus.  CHEST WALL AND LOWER NECK: Within normal limits.    ABDOMEN AND PELVIS:  LIVER: Multiple liver cysts.  BILE DUCTS: Normal caliber.  GALLBLADDER: Within normal limits.  SPLEEN: Within normal limits.  PANCREAS: Within normal limits.  ADRENALS: Within normal limits.  KIDNEYS/URETERS: Multiple bilateral renal cysts.    BLADDER: Within normal limits.  REPRODUCTIVE ORGANS: Calcified fibroid.    BOWEL: No bowel obstruction. Appendix is normal.  PERITONEUM: No ascites.  VESSELS: Mild atherosclerosis.  RETROPERITONEUM/LYMPH NODES: No lymphadenopathy.  ABDOMINAL WALL: Within normal limits.  BONES: Multilevelcompression fracture of the vertebral bodies and   vertebroplasty. Left hip arthroplasty.    IMPRESSION:  No pulmonary embolus.    Mucoid impactions and consolidation of basilar left lower lobe. Bilateral   tree-in-bud opacities likely impacted distal airways/aspiration.    Moderate retained ingested material/food bolus within the esophagus.    No acute finding within abdomen and pelvis.   CC: Pneumonia due to infectious organism      HPI:  77 yo female who has a pmh/o chronic back pain, GERD, anxiety, OP, hepatic cysts, LLE DVT not on AC per NH chart, displaced left intertrochanteric hip fracture, who resides at [facility] and was found on floor with blue lips and unresponsiveness, got 4 chest compressions. Yams suctioned from mouth and pt BIBA to  ED. Pt AAOx3 currently however states the last thing she remembers was eating then falling over. Pt originally placed on 2L NC for pox of 88% then switched to NRB with improvement of oxygen to 95%. GI and ICU consulted and no further intervention recommended other than admission to medicine for treatment of aspiration PNA. On admitting exam pt with areas of ecchymoses but states she has no pain on palpation. Pt also noted to have dried yams around face, cleaned. Pt appears comfortable, now saturating well on Room air. States she has had difficulty swallowing in past.      INTERVAL HPI/ OVERNIGHT EVENTS: Pt was seen and examined,       Vital Signs Last 24 Hrs  T(C): 36.4 (03 Dec 2023 08:46), Max: 37.2 (02 Dec 2023 16:42)  T(F): 97.5 (03 Dec 2023 08:46), Max: 99 (02 Dec 2023 16:42)  HR: 62 (03 Dec 2023 08:46) (62 - 75)  BP: 130/66 (03 Dec 2023 08:46) (124/64 - 130/66)  RR: 18 (03 Dec 2023 08:46) (18 - 18)  SpO2: 100% (03 Dec 2023 08:46) (97% - 100%)    Parameters below as of 03 Dec 2023 08:46  Patient On (Oxygen Delivery Method): nasal cannula  O2 Flow (L/min): 2      REVIEW OF SYSTEMS:  All other review of systems is negative unless indicated above.      PHYSICAL EXAM:  General: frail elderly female, in no acute distress  Eyes:  EOMI; conjunctiva and sclera clear  Head: Normocephalic; atraumatic  ENMT: No nasal discharge; airway clear  Respiratory: Decreased BS at bases, No wheezes or rhonchi  Chest : L chest wall bruise,  tenderness to palpation   Cardiovascular: Regular rate and rhythm. S1 and S2 Normal;   Gastrointestinal: Soft, diffuse abd tenderness to palpation,  mildly distended; +  bowel sounds  Genitourinary: No  suprapubic  tenderness  Extremities: No  edema  Neurological: Alert and oriented x1-2, confused  non focal   Musculoskeletal: Normal muscle tone, without deformities. B/L LE bruises with scabbing. No Joint edema    Psychiatric: Cooperative    LABS:                         10.5   7.56  )-----------( 159      ( 02 Dec 2023 05:36 )             31.4     12-02    143  |  113<H>  |  15  ----------------------------<  111<H>  3.4<L>   |  26  |  0.50    Ca    7.9<L>      02 Dec 2023 05:36    TPro  6.2  /  Alb  2.6<L>  /  TBili  0.8  /  DBili  x   /  AST  21  /  ALT  22  /  AlkPhos  71  12-01        LIVER FUNCTIONS - ( 01 Dec 2023 08:44 )  Alb: 2.6 g/dL / Pro: 6.2 gm/dL / ALK PHOS: 71 U/L / ALT: 22 U/L / AST: 21 U/L / GGT: x           PT/INR - ( 01 Dec 2023 08:44 )   PT: 32.5 sec;   INR: 2.97 ratio         PTT - ( 01 Dec 2023 08:44 )  PTT:48.3 sec  Urinalysis Basic - ( 02 Dec 2023 05:36 )    Color: x / Appearance: x / SG: x / pH: x  Gluc: 111 mg/dL / Ketone: x  / Bili: x / Urobili: x   Blood: x / Protein: x / Nitrite: x   Leuk Esterase: x / RBC: x / WBC x   Sq Epi: x / Non Sq Epi: x / Bacteria: x                          12.0   9.66  )-----------( 184      ( 01 Dec 2023 08:44 )             36.6     01 Dec 2023 08:44    144    |  112    |  18     ----------------------------<  102    3.4     |  26     |  0.72     Ca    8.4        01 Dec 2023 08:44    TPro  6.2    /  Alb  2.6    /  TBili  0.8    /  DBili  x      /  AST  21     /  ALT  22     /  AlkPhos  71     01 Dec 2023 08:44  PT/INR - ( 01 Dec 2023 08:44 )   PT: 32.5 sec;   INR: 2.97 ratio    PTT - ( 01 Dec 2023 08:44 )  PTT:48.3 sec  LIVER FUNCTIONS - ( 01 Dec 2023 08:44 )  Alb: 2.6 g/dL / Pro: 6.2 gm/dL / ALK PHOS: 71 U/L / ALT: 22 U/L / AST: 21 U/L / GGT: x           Urinalysis Basic - ( 01 Dec 2023 08:44 )  Color: x / Appearance: x / SG: x / pH: x  Gluc: 102 mg/dL / Ketone: x  / Bili: x / Urobili: x   Blood: x / Protein: x / Nitrite: x   Leuk Esterase: x / RBC: x / WBC x   Sq Epi: x / Non Sq Epi: x / Bacteria: x        MEDICATIONS  (STANDING):  heparin   Injectable 5000 Unit(s) SubCutaneous every 12 hours  piperacillin/tazobactam IVPB.. 3.375 Gram(s) IV Intermittent every 8 hours    MEDICATIONS  (PRN):  albuterol/ipratropium for Nebulization 3 milliLiter(s) Nebulizer every 6 hours PRN Shortness of Breath and/or Wheezing  ondansetron Injectable 4 milliGRAM(s) IV Push every 8 hours PRN Nausea and/or Vomiting      RADIOLOGY & ADDITIONAL TESTS:    ACC: [id number] EXAM:  CT CERVICAL SPINE   ORDERED BY: PRIYANKA QUIÑONES     ACC: [id number] EXAM:  CT BRAIN   ORDERED BY: PRIYANKA QUIÑONES     PROCEDURE DATE:  11/30/2023          INTERPRETATION:  CT head without IV contrast    CLINICAL INFORMATION:  Fall   Intracranialhemorrhage.    TECHNIQUE: Contiguous axial 5 mm sections were obtained through the head.   Sagittal and coronal 2-D reformatted images were also obtained.   This   scan was performed using automatic exposure control (radiation dose   reduction software) to obtain a diagnostic image quality scan with   patient dose as low as reasonably achievable.    FINDINGS:   CT dated 12/18/2022 available for review.    The brain demonstrates mild periventricular white matter ischemia.   No   acute cerebral cortical infarct is seen.  No intracranial hemorrhage is   found.  No mass effect is found in the brain.    The ventricles, sulci and basal cisterns appear unremarkable.    The orbits are unremarkable.  The paranasal sinuses are clear.  The nasal   cavity appears intact.  The nasopharynx is symmetric.  The central skull   base, petrous temporal bones and calvarium remain intact.    CT cervical spine without IV contrast    CLINICAL INFORMATION: Fracture, trauma, neck pain.  Neck pain, spinal   stenosis, spondylosis. unresponsive and fall, food impaction    TECHNIQUE:  Contiguous axial 2.0 mm sections were obtained through the   cervical spine using a single helical acquisition.  Additional 2 mm   sagittal and coronal reformatted reconstructions of the spine were   obtained.  These additional reformatted images were used to evaluate the   spine for alignment, vertebral fractures and the integrity of the the   posterior elements.   This scan was performed using automatic exposure   control (radiation dose reduction software) to obtain a diagnostic image   quality scan with patient dose as low as reasonably achievable.    FINDINGS:   No prior similar studies are available for review    Cervical vertebral body heights are maintained. No vertebral fracture is   seen. No destructive bone lesion is found.  Alignment is preserved.    Facet joints appear intact and aligned.    Cervical intervertebral disc spaces show mild degenerative disc disease   and spondylosis at C3-4 through C6/7 withloss of disc height and   associated degenerative endplate changes. There is narrowing of the LEFT   C2-3, BILATERAL C3-4, C4-5, C5-6 and C6-7 neural foramina due to   uncovertebral spurring and facet osteophytic hypertrophy. Degenerative   ridge/disc complexes at C4-5 and C5-6 flatten the ventral thecal sac.    The skull base appears intact.  No neck mass is recognized.  Paraspinal   soft tissues appear intact. Visualized lymph nodes appear to be within   physiologic size limits.    Food impaction is seen within a dilated proximal esophagus.    IMPRESSION:    CT HEAD: mild periventricular white matter ischemia    CT cervical spine:   No vertebral fracture is recognized.  Mild   degenerative disc disease and spondylosis at C3-4 through C6/7 with loss   of disc height and associated degenerative endplate changes. There is   narrowing of the LEFT C2-3, BILATERAL C3-4, C4-5, C5-6 and C6-7 neural   foramina due to uncovertebral spurring and facet osteophytic hypertrophy.   Degenerative ridge/disc complexes at C4-5 and C5-6 flatten the ventral   thecal sac. Food impaction is seen within a dilated proximal esophagus.   Recommend CT chest with IV contrast) and oral contrast ingested   immediately prior to examination for further evaluation.        ACC: [id number] EXAM:  CT ABDOMEN AND PELVIS IC   ORDERED BY: PRIYANKA QUIÑONES     ACC: [id number] EXAM:  CT ANGIO CHEST PULM ART WAWIC   ORDERED BY: PRIYANKA QUIÑONES     PROCEDURE DATE:  11/30/2023          INTERPRETATION:  CLINICAL INFORMATION: Hypoxia, abdominal pain    COMPARISON: CT chest abdomen and pelvis 12/18/2022    CONTRAST/COMPLICATIONS:  IV Contrast: 90 mm of Omnipaque 350  Oral Contrast: NONE  Complications: None reported at time of study completion    PROCEDURE:  CT Angiography of the Chest was performed followed by portal venous phase   imaging of the Abdomen and Pelvis.  Sagittal and coronal reformats were performed as well as 3D (MIP)   reconstructions.    FINDINGS:  CHEST:  LUNGS AND LARGE AIRWAYS: Patent central airways. Mucoid impactions and  consolidation of basilar left lower lobe. Bilateral tree-in-bud opacities   likely impacted distal airways/aspiration.  PLEURA: No pleural effusion.  VESSELS: No pulmonary embolus. Normal aorta.  HEART: Heart size is normal. No pericardial effusion. Mild coronary   calcification.  MEDIASTINUM AND BUNNY: No lymphadenopathy. Moderate retained ingested   material/food bolus within the esophagus.  CHEST WALL AND LOWER NECK: Within normal limits.    ABDOMEN AND PELVIS:  LIVER: Multiple liver cysts.  BILE DUCTS: Normal caliber.  GALLBLADDER: Within normal limits.  SPLEEN: Within normal limits.  PANCREAS: Within normal limits.  ADRENALS: Within normal limits.  KIDNEYS/URETERS: Multiple bilateral renal cysts.    BLADDER: Within normal limits.  REPRODUCTIVE ORGANS: Calcified fibroid.    BOWEL: No bowel obstruction. Appendix is normal.  PERITONEUM: No ascites.  VESSELS: Mild atherosclerosis.  RETROPERITONEUM/LYMPH NODES: No lymphadenopathy.  ABDOMINAL WALL: Within normal limits.  BONES: Multilevelcompression fracture of the vertebral bodies and   vertebroplasty. Left hip arthroplasty.    IMPRESSION:  No pulmonary embolus.    Mucoid impactions and consolidation of basilar left lower lobe. Bilateral   tree-in-bud opacities likely impacted distal airways/aspiration.    Moderate retained ingested material/food bolus within the esophagus.    No acute finding within abdomen and pelvis.   CC: Pneumonia due to infectious organism      HPI:  79 yo female who has a pmh/o chronic back pain, GERD, anxiety, OP, hepatic cysts, LLE DVT not on AC per NH chart, displaced left intertrochanteric hip fracture, who resides at [facility] and was found on floor with blue lips and unresponsiveness, got 4 chest compressions. Yams suctioned from mouth and pt BIBA to  ED. Pt AAOx3 currently however states the last thing she remembers was eating then falling over. Pt originally placed on 2L NC for pox of 88% then switched to NRB with improvement of oxygen to 95%. GI and ICU consulted and no further intervention recommended other than admission to medicine for treatment of aspiration PNA. On admitting exam pt with areas of ecchymoses but states she has no pain on palpation. Pt also noted to have dried yams around face, cleaned. Pt appears comfortable, now saturating well on Room air. States she has had difficulty swallowing in past.      INTERVAL HPI/ OVERNIGHT EVENTS: Pt was seen and examined, baseline confused reports SOB is better, some cough. Tolerates diet.  Reports no abd pain. No stool today     Vital Signs Last 24 Hrs  T(C): 36.4 (03 Dec 2023 08:46), Max: 37.2 (02 Dec 2023 16:42)  T(F): 97.5 (03 Dec 2023 08:46), Max: 99 (02 Dec 2023 16:42)  HR: 62 (03 Dec 2023 08:46) (62 - 75)  BP: 130/66 (03 Dec 2023 08:46) (124/64 - 130/66)  RR: 18 (03 Dec 2023 08:46) (18 - 18)  SpO2: 100% (03 Dec 2023 08:46) (97% - 100%)    Parameters below as of 03 Dec 2023 08:46  Patient On (Oxygen Delivery Method): nasal cannula  O2 Flow (L/min): 2      REVIEW OF SYSTEMS:  All other review of systems is negative unless indicated above.      PHYSICAL EXAM:  General: frail elderly female, in no acute distress  Eyes:  EOMI; conjunctiva and sclera clear  Head: Normocephalic; atraumatic  ENMT: No nasal discharge; airway clear  Respiratory: Decreased BS at bases, No wheezes or rhonchi  Chest : L chest wall bruise,  tenderness to palpation   Cardiovascular: Regular rate and rhythm. S1 and S2 Normal;   Gastrointestinal: Soft,  non tenderness to palpation,  mildly distended; +  bowel sounds  Genitourinary: No  suprapubic  tenderness  Extremities: No  edema  Neurological: Alert and oriented x1-2, confused  non focal   Musculoskeletal: Normal muscle tone, without deformities. B/L LE bruises with scabbing. No Joint edema    Psychiatric: Cooperative    LABS:                         11.3   5.58  )-----------( 180      ( 03 Dec 2023 07:47 )             33.6     12-03    141  |  109<H>  |  9   ----------------------------<  109<H>  3.6   |  29  |  0.54    Ca    8.5      03 Dec 2023 07:47  Phos  2.8     12-03  Mg     2.3     12-03    PT/INR - ( 03 Dec 2023 07:47 )   PT: 16.9 sec;   INR: 1.51 ratio      PTT - ( 03 Dec 2023 07:47 )  PTT:30.9 sec                              10.5   7.56  )-----------( 159      ( 02 Dec 2023 05:36 )             31.4     12-02    143  |  113<H>  |  15  ----------------------------<  111<H>  3.4<L>   |  26  |  0.50    Ca    7.9<L>      02 Dec 2023 05:36    TPro  6.2  /  Alb  2.6<L>  /  TBili  0.8  /  DBili  x   /  AST  21  /  ALT  22  /  AlkPhos  71  12-01        LIVER FUNCTIONS - ( 01 Dec 2023 08:44 )  Alb: 2.6 g/dL / Pro: 6.2 gm/dL / ALK PHOS: 71 U/L / ALT: 22 U/L / AST: 21 U/L / GGT: x           PT/INR - ( 01 Dec 2023 08:44 )   PT: 32.5 sec;   INR: 2.97 ratio         PTT - ( 01 Dec 2023 08:44 )  PTT:48.3 sec  Urinalysis Basic - ( 02 Dec 2023 05:36 )    Color: x / Appearance: x / SG: x / pH: x  Gluc: 111 mg/dL / Ketone: x  / Bili: x / Urobili: x   Blood: x / Protein: x / Nitrite: x   Leuk Esterase: x / RBC: x / WBC x   Sq Epi: x / Non Sq Epi: x / Bacteria: x                          12.0   9.66  )-----------( 184      ( 01 Dec 2023 08:44 )             36.6     01 Dec 2023 08:44    144    |  112    |  18     ----------------------------<  102    3.4     |  26     |  0.72     Ca    8.4        01 Dec 2023 08:44    TPro  6.2    /  Alb  2.6    /  TBili  0.8    /  DBili  x      /  AST  21     /  ALT  22     /  AlkPhos  71     01 Dec 2023 08:44  PT/INR - ( 01 Dec 2023 08:44 )   PT: 32.5 sec;   INR: 2.97 ratio    PTT - ( 01 Dec 2023 08:44 )  PTT:48.3 sec  LIVER FUNCTIONS - ( 01 Dec 2023 08:44 )  Alb: 2.6 g/dL / Pro: 6.2 gm/dL / ALK PHOS: 71 U/L / ALT: 22 U/L / AST: 21 U/L / GGT: x           Urinalysis Basic - ( 01 Dec 2023 08:44 )  Color: x / Appearance: x / SG: x / pH: x  Gluc: 102 mg/dL / Ketone: x  / Bili: x / Urobili: x   Blood: x / Protein: x / Nitrite: x   Leuk Esterase: x / RBC: x / WBC x   Sq Epi: x / Non Sq Epi: x / Bacteria: x        MEDICATIONS  (STANDING):  heparin   Injectable 5000 Unit(s) SubCutaneous every 12 hours  piperacillin/tazobactam IVPB.. 3.375 Gram(s) IV Intermittent every 8 hours    MEDICATIONS  (PRN):  albuterol/ipratropium for Nebulization 3 milliLiter(s) Nebulizer every 6 hours PRN Shortness of Breath and/or Wheezing  ondansetron Injectable 4 milliGRAM(s) IV Push every 8 hours PRN Nausea and/or Vomiting      RADIOLOGY & ADDITIONAL TESTS:    ACC: [id number] EXAM:  CT CERVICAL SPINE   ORDERED BY: PRIYANKA QUIÑONES     ACC: [id number] EXAM:  CT BRAIN   ORDERED BY: PRIYANKA QUIÑONES     PROCEDURE DATE:  11/30/2023          INTERPRETATION:  CT head without IV contrast    CLINICAL INFORMATION:  Fall   Intracranialhemorrhage.    TECHNIQUE: Contiguous axial 5 mm sections were obtained through the head.   Sagittal and coronal 2-D reformatted images were also obtained.   This   scan was performed using automatic exposure control (radiation dose   reduction software) to obtain a diagnostic image quality scan with   patient dose as low as reasonably achievable.    FINDINGS:   CT dated 12/18/2022 available for review.    The brain demonstrates mild periventricular white matter ischemia.   No   acute cerebral cortical infarct is seen.  No intracranial hemorrhage is   found.  No mass effect is found in the brain.    The ventricles, sulci and basal cisterns appear unremarkable.    The orbits are unremarkable.  The paranasal sinuses are clear.  The nasal   cavity appears intact.  The nasopharynx is symmetric.  The central skull   base, petrous temporal bones and calvarium remain intact.    CT cervical spine without IV contrast    CLINICAL INFORMATION: Fracture, trauma, neck pain.  Neck pain, spinal   stenosis, spondylosis. unresponsive and fall, food impaction    TECHNIQUE:  Contiguous axial 2.0 mm sections were obtained through the   cervical spine using a single helical acquisition.  Additional 2 mm   sagittal and coronal reformatted reconstructions of the spine were   obtained.  These additional reformatted images were used to evaluate the   spine for alignment, vertebral fractures and the integrity of the the   posterior elements.   This scan was performed using automatic exposure   control (radiation dose reduction software) to obtain a diagnostic image   quality scan with patient dose as low as reasonably achievable.    FINDINGS:   No prior similar studies are available for review    Cervical vertebral body heights are maintained. No vertebral fracture is   seen. No destructive bone lesion is found.  Alignment is preserved.    Facet joints appear intact and aligned.    Cervical intervertebral disc spaces show mild degenerative disc disease   and spondylosis at C3-4 through C6/7 withloss of disc height and   associated degenerative endplate changes. There is narrowing of the LEFT   C2-3, BILATERAL C3-4, C4-5, C5-6 and C6-7 neural foramina due to   uncovertebral spurring and facet osteophytic hypertrophy. Degenerative   ridge/disc complexes at C4-5 and C5-6 flatten the ventral thecal sac.    The skull base appears intact.  No neck mass is recognized.  Paraspinal   soft tissues appear intact. Visualized lymph nodes appear to be within   physiologic size limits.    Food impaction is seen within a dilated proximal esophagus.    IMPRESSION:    CT HEAD: mild periventricular white matter ischemia    CT cervical spine:   No vertebral fracture is recognized.  Mild   degenerative disc disease and spondylosis at C3-4 through C6/7 with loss   of disc height and associated degenerative endplate changes. There is   narrowing of the LEFT C2-3, BILATERAL C3-4, C4-5, C5-6 and C6-7 neural   foramina due to uncovertebral spurring and facet osteophytic hypertrophy.   Degenerative ridge/disc complexes at C4-5 and C5-6 flatten the ventral   thecal sac. Food impaction is seen within a dilated proximal esophagus.   Recommend CT chest with IV contrast) and oral contrast ingested   immediately prior to examination for further evaluation.        ACC: [id number] EXAM:  CT ABDOMEN AND PELVIS IC   ORDERED BY: PRIYANKA QUIÑONES     ACC: [id number] EXAM:  CT ANGIO CHEST PULM ART WAWIC   ORDERED BY: PRIYANKA QUIÑONES     PROCEDURE DATE:  11/30/2023          INTERPRETATION:  CLINICAL INFORMATION: Hypoxia, abdominal pain    COMPARISON: CT chest abdomen and pelvis 12/18/2022    CONTRAST/COMPLICATIONS:  IV Contrast: 90 mm of Omnipaque 350  Oral Contrast: NONE  Complications: None reported at time of study completion    PROCEDURE:  CT Angiography of the Chest was performed followed by portal venous phase   imaging of the Abdomen and Pelvis.  Sagittal and coronal reformats were performed as well as 3D (MIP)   reconstructions.    FINDINGS:  CHEST:  LUNGS AND LARGE AIRWAYS: Patent central airways. Mucoid impactions and  consolidation of basilar left lower lobe. Bilateral tree-in-bud opacities   likely impacted distal airways/aspiration.  PLEURA: No pleural effusion.  VESSELS: No pulmonary embolus. Normal aorta.  HEART: Heart size is normal. No pericardial effusion. Mild coronary   calcification.  MEDIASTINUM AND BUNNY: No lymphadenopathy. Moderate retained ingested   material/food bolus within the esophagus.  CHEST WALL AND LOWER NECK: Within normal limits.    ABDOMEN AND PELVIS:  LIVER: Multiple liver cysts.  BILE DUCTS: Normal caliber.  GALLBLADDER: Within normal limits.  SPLEEN: Within normal limits.  PANCREAS: Within normal limits.  ADRENALS: Within normal limits.  KIDNEYS/URETERS: Multiple bilateral renal cysts.    BLADDER: Within normal limits.  REPRODUCTIVE ORGANS: Calcified fibroid.    BOWEL: No bowel obstruction. Appendix is normal.  PERITONEUM: No ascites.  VESSELS: Mild atherosclerosis.  RETROPERITONEUM/LYMPH NODES: No lymphadenopathy.  ABDOMINAL WALL: Within normal limits.  BONES: Multilevelcompression fracture of the vertebral bodies and   vertebroplasty. Left hip arthroplasty.    IMPRESSION:  No pulmonary embolus.    Mucoid impactions and consolidation of basilar left lower lobe. Bilateral   tree-in-bud opacities likely impacted distal airways/aspiration.    Moderate retained ingested material/food bolus within the esophagus.    No acute finding within abdomen and pelvis.   CC: Pneumonia due to infectious organism      HPI:  79 yo female who has a pmh/o chronic back pain, GERD, anxiety, OP, hepatic cysts, LLE DVT not on AC per NH chart, displaced left intertrochanteric hip fracture, who resides at [facility] and was found on floor with blue lips and unresponsiveness, got 4 chest compressions. Yams suctioned from mouth and pt BIBA to  ED. Pt AAOx3 currently however states the last thing she remembers was eating then falling over. Pt originally placed on 2L NC for pox of 88% then switched to NRB with improvement of oxygen to 95%. GI and ICU consulted and no further intervention recommended other than admission to medicine for treatment of aspiration PNA. On admitting exam pt with areas of ecchymoses but states she has no pain on palpation. Pt also noted to have dried yams around face, cleaned. Pt appears comfortable, now saturating well on Room air. States she has had difficulty swallowing in past.      INTERVAL HPI/ OVERNIGHT EVENTS: Pt was seen and examined, baseline confused reports SOB is better, some cough. Tolerates diet.  Reports no abd pain. No stool today     Vital Signs Last 24 Hrs  T(C): 36.4 (03 Dec 2023 08:46), Max: 37.2 (02 Dec 2023 16:42)  T(F): 97.5 (03 Dec 2023 08:46), Max: 99 (02 Dec 2023 16:42)  HR: 62 (03 Dec 2023 08:46) (62 - 75)  BP: 130/66 (03 Dec 2023 08:46) (124/64 - 130/66)  RR: 18 (03 Dec 2023 08:46) (18 - 18)  SpO2: 100% (03 Dec 2023 08:46) (97% - 100%)    Parameters below as of 03 Dec 2023 08:46  Patient On (Oxygen Delivery Method): nasal cannula  O2 Flow (L/min): 2      REVIEW OF SYSTEMS:  All other review of systems is negative unless indicated above.      PHYSICAL EXAM:  General: frail elderly female, in no acute distress  Eyes:  EOMI; conjunctiva and sclera clear  Head: Normocephalic; atraumatic  ENMT: No nasal discharge; airway clear  Respiratory: Decreased BS at bases, No wheezes or rhonchi  Chest : L chest wall bruise,  tenderness to palpation   Cardiovascular: Regular rate and rhythm. S1 and S2 Normal;   Gastrointestinal: Soft,  non tenderness to palpation,  mildly distended; +  bowel sounds  Genitourinary: No  suprapubic  tenderness  Extremities: No  edema  Neurological: Alert and oriented x1-2, confused  non focal   Musculoskeletal: Normal muscle tone, without deformities. B/L LE bruises with scabbing. No Joint edema    Psychiatric: Cooperative    LABS:                         11.3   5.58  )-----------( 180      ( 03 Dec 2023 07:47 )             33.6     12-03    141  |  109<H>  |  9   ----------------------------<  109<H>  3.6   |  29  |  0.54    Ca    8.5      03 Dec 2023 07:47  Phos  2.8     12-03  Mg     2.3     12-03    PT/INR - ( 03 Dec 2023 07:47 )   PT: 16.9 sec;   INR: 1.51 ratio      PTT - ( 03 Dec 2023 07:47 )  PTT:30.9 sec                              10.5   7.56  )-----------( 159      ( 02 Dec 2023 05:36 )             31.4     12-02    143  |  113<H>  |  15  ----------------------------<  111<H>  3.4<L>   |  26  |  0.50    Ca    7.9<L>      02 Dec 2023 05:36    TPro  6.2  /  Alb  2.6<L>  /  TBili  0.8  /  DBili  x   /  AST  21  /  ALT  22  /  AlkPhos  71  12-01        LIVER FUNCTIONS - ( 01 Dec 2023 08:44 )  Alb: 2.6 g/dL / Pro: 6.2 gm/dL / ALK PHOS: 71 U/L / ALT: 22 U/L / AST: 21 U/L / GGT: x           PT/INR - ( 01 Dec 2023 08:44 )   PT: 32.5 sec;   INR: 2.97 ratio         PTT - ( 01 Dec 2023 08:44 )  PTT:48.3 sec  Urinalysis Basic - ( 02 Dec 2023 05:36 )    Color: x / Appearance: x / SG: x / pH: x  Gluc: 111 mg/dL / Ketone: x  / Bili: x / Urobili: x   Blood: x / Protein: x / Nitrite: x   Leuk Esterase: x / RBC: x / WBC x   Sq Epi: x / Non Sq Epi: x / Bacteria: x                          12.0   9.66  )-----------( 184      ( 01 Dec 2023 08:44 )             36.6     01 Dec 2023 08:44    144    |  112    |  18     ----------------------------<  102    3.4     |  26     |  0.72     Ca    8.4        01 Dec 2023 08:44    TPro  6.2    /  Alb  2.6    /  TBili  0.8    /  DBili  x      /  AST  21     /  ALT  22     /  AlkPhos  71     01 Dec 2023 08:44  PT/INR - ( 01 Dec 2023 08:44 )   PT: 32.5 sec;   INR: 2.97 ratio    PTT - ( 01 Dec 2023 08:44 )  PTT:48.3 sec  LIVER FUNCTIONS - ( 01 Dec 2023 08:44 )  Alb: 2.6 g/dL / Pro: 6.2 gm/dL / ALK PHOS: 71 U/L / ALT: 22 U/L / AST: 21 U/L / GGT: x           Urinalysis Basic - ( 01 Dec 2023 08:44 )  Color: x / Appearance: x / SG: x / pH: x  Gluc: 102 mg/dL / Ketone: x  / Bili: x / Urobili: x   Blood: x / Protein: x / Nitrite: x   Leuk Esterase: x / RBC: x / WBC x   Sq Epi: x / Non Sq Epi: x / Bacteria: x        MEDICATIONS  (STANDING):  heparin   Injectable 5000 Unit(s) SubCutaneous every 12 hours  piperacillin/tazobactam IVPB.. 3.375 Gram(s) IV Intermittent every 8 hours    MEDICATIONS  (PRN):  albuterol/ipratropium for Nebulization 3 milliLiter(s) Nebulizer every 6 hours PRN Shortness of Breath and/or Wheezing  ondansetron Injectable 4 milliGRAM(s) IV Push every 8 hours PRN Nausea and/or Vomiting      RADIOLOGY & ADDITIONAL TESTS:    ACC: [id number] EXAM:  CT CERVICAL SPINE   ORDERED BY: PRIYANKA QUIÑONES     ACC: [id number] EXAM:  CT BRAIN   ORDERED BY: PRIYANKA QUIÑONES     PROCEDURE DATE:  11/30/2023          INTERPRETATION:  CT head without IV contrast    CLINICAL INFORMATION:  Fall   Intracranialhemorrhage.    TECHNIQUE: Contiguous axial 5 mm sections were obtained through the head.   Sagittal and coronal 2-D reformatted images were also obtained.   This   scan was performed using automatic exposure control (radiation dose   reduction software) to obtain a diagnostic image quality scan with   patient dose as low as reasonably achievable.    FINDINGS:   CT dated 12/18/2022 available for review.    The brain demonstrates mild periventricular white matter ischemia.   No   acute cerebral cortical infarct is seen.  No intracranial hemorrhage is   found.  No mass effect is found in the brain.    The ventricles, sulci and basal cisterns appear unremarkable.    The orbits are unremarkable.  The paranasal sinuses are clear.  The nasal   cavity appears intact.  The nasopharynx is symmetric.  The central skull   base, petrous temporal bones and calvarium remain intact.    CT cervical spine without IV contrast    CLINICAL INFORMATION: Fracture, trauma, neck pain.  Neck pain, spinal   stenosis, spondylosis. unresponsive and fall, food impaction    TECHNIQUE:  Contiguous axial 2.0 mm sections were obtained through the   cervical spine using a single helical acquisition.  Additional 2 mm   sagittal and coronal reformatted reconstructions of the spine were   obtained.  These additional reformatted images were used to evaluate the   spine for alignment, vertebral fractures and the integrity of the the   posterior elements.   This scan was performed using automatic exposure   control (radiation dose reduction software) to obtain a diagnostic image   quality scan with patient dose as low as reasonably achievable.    FINDINGS:   No prior similar studies are available for review    Cervical vertebral body heights are maintained. No vertebral fracture is   seen. No destructive bone lesion is found.  Alignment is preserved.    Facet joints appear intact and aligned.    Cervical intervertebral disc spaces show mild degenerative disc disease   and spondylosis at C3-4 through C6/7 withloss of disc height and   associated degenerative endplate changes. There is narrowing of the LEFT   C2-3, BILATERAL C3-4, C4-5, C5-6 and C6-7 neural foramina due to   uncovertebral spurring and facet osteophytic hypertrophy. Degenerative   ridge/disc complexes at C4-5 and C5-6 flatten the ventral thecal sac.    The skull base appears intact.  No neck mass is recognized.  Paraspinal   soft tissues appear intact. Visualized lymph nodes appear to be within   physiologic size limits.    Food impaction is seen within a dilated proximal esophagus.    IMPRESSION:    CT HEAD: mild periventricular white matter ischemia    CT cervical spine:   No vertebral fracture is recognized.  Mild   degenerative disc disease and spondylosis at C3-4 through C6/7 with loss   of disc height and associated degenerative endplate changes. There is   narrowing of the LEFT C2-3, BILATERAL C3-4, C4-5, C5-6 and C6-7 neural   foramina due to uncovertebral spurring and facet osteophytic hypertrophy.   Degenerative ridge/disc complexes at C4-5 and C5-6 flatten the ventral   thecal sac. Food impaction is seen within a dilated proximal esophagus.   Recommend CT chest with IV contrast) and oral contrast ingested   immediately prior to examination for further evaluation.        ACC: [id number] EXAM:  CT ABDOMEN AND PELVIS IC   ORDERED BY: PRIYANKA QUIÑONES     ACC: [id number] EXAM:  CT ANGIO CHEST PULM ART WAWIC   ORDERED BY: PRIYANKA QUIÑONES     PROCEDURE DATE:  11/30/2023          INTERPRETATION:  CLINICAL INFORMATION: Hypoxia, abdominal pain    COMPARISON: CT chest abdomen and pelvis 12/18/2022    CONTRAST/COMPLICATIONS:  IV Contrast: 90 mm of Omnipaque 350  Oral Contrast: NONE  Complications: None reported at time of study completion    PROCEDURE:  CT Angiography of the Chest was performed followed by portal venous phase   imaging of the Abdomen and Pelvis.  Sagittal and coronal reformats were performed as well as 3D (MIP)   reconstructions.    FINDINGS:  CHEST:  LUNGS AND LARGE AIRWAYS: Patent central airways. Mucoid impactions and  consolidation of basilar left lower lobe. Bilateral tree-in-bud opacities   likely impacted distal airways/aspiration.  PLEURA: No pleural effusion.  VESSELS: No pulmonary embolus. Normal aorta.  HEART: Heart size is normal. No pericardial effusion. Mild coronary   calcification.  MEDIASTINUM AND BUNNY: No lymphadenopathy. Moderate retained ingested   material/food bolus within the esophagus.  CHEST WALL AND LOWER NECK: Within normal limits.    ABDOMEN AND PELVIS:  LIVER: Multiple liver cysts.  BILE DUCTS: Normal caliber.  GALLBLADDER: Within normal limits.  SPLEEN: Within normal limits.  PANCREAS: Within normal limits.  ADRENALS: Within normal limits.  KIDNEYS/URETERS: Multiple bilateral renal cysts.    BLADDER: Within normal limits.  REPRODUCTIVE ORGANS: Calcified fibroid.    BOWEL: No bowel obstruction. Appendix is normal.  PERITONEUM: No ascites.  VESSELS: Mild atherosclerosis.  RETROPERITONEUM/LYMPH NODES: No lymphadenopathy.  ABDOMINAL WALL: Within normal limits.  BONES: Multilevelcompression fracture of the vertebral bodies and   vertebroplasty. Left hip arthroplasty.    IMPRESSION:  No pulmonary embolus.    Mucoid impactions and consolidation of basilar left lower lobe. Bilateral   tree-in-bud opacities likely impacted distal airways/aspiration.    Moderate retained ingested material/food bolus within the esophagus.    No acute finding within abdomen and pelvis.   CC: Pneumonia due to infectious organism      HPI:  79 yo female who has a pmh/o chronic back pain, GERD, anxiety, OP, hepatic cysts, LLE DVT not on AC per NH chart, displaced left intertrochanteric hip fracture, who resides at [facility] and was found on floor with blue lips and unresponsiveness, got 4 chest compressions. Yams suctioned from mouth and pt BIBA to  ED. Pt AAOx3 currently however states the last thing she remembers was eating then falling over. Pt originally placed on 2L NC for pox of 88% then switched to NRB with improvement of oxygen to 95%. GI and ICU consulted and no further intervention recommended other than admission to medicine for treatment of aspiration PNA. On admitting exam pt with areas of ecchymoses but states she has no pain on palpation. Pt also noted to have dried yams around face, cleaned. Pt appears comfortable, now saturating well on Room air. States she has had difficulty swallowing in past.      INTERVAL HPI/ OVERNIGHT EVENTS: Pt was seen and examined, baseline confused reports SOB is better, some cough. Tolerates diet.  Reports no abd pain. No stool today     Vital Signs Last 24 Hrs  T(C): 36.4 (03 Dec 2023 08:46), Max: 37.2 (02 Dec 2023 16:42)  T(F): 97.5 (03 Dec 2023 08:46), Max: 99 (02 Dec 2023 16:42)  HR: 62 (03 Dec 2023 08:46) (62 - 75)  BP: 130/66 (03 Dec 2023 08:46) (124/64 - 130/66)  RR: 18 (03 Dec 2023 08:46) (18 - 18)  SpO2: 100% (03 Dec 2023 08:46) (97% - 100%)    Parameters below as of 03 Dec 2023 08:46  Patient On (Oxygen Delivery Method): nasal cannula  O2 Flow (L/min): 2      REVIEW OF SYSTEMS:  All other review of systems is negative unless indicated above.      PHYSICAL EXAM:  General: frail elderly female, in no acute distress  Eyes:  EOMI; conjunctiva and sclera clear  Head: Normocephalic; atraumatic  ENMT: No nasal discharge; airway clear  Respiratory: Decreased BS at bases, No wheezes or rhonchi  Chest : L chest wall bruise,  tenderness to palpation   Cardiovascular: Regular rate and rhythm. S1 and S2 Normal;   Gastrointestinal: Soft,  non tenderness to palpation,  mildly distended; +  bowel sounds  Genitourinary: No  suprapubic  tenderness  Extremities: No  edema  Neurological: Alert and oriented x1-2, confused  non focal   Musculoskeletal: Normal muscle tone, without deformities. B/L LE bruises with scabbing. No Joint edema    Psychiatric: Cooperative    LABS:                         11.3   5.58  )-----------( 180      ( 03 Dec 2023 07:47 )             33.6     12-03    141  |  109<H>  |  9   ----------------------------<  109<H>  3.6   |  29  |  0.54    Ca    8.5      03 Dec 2023 07:47  Phos  2.8     12-03  Mg     2.3     12-03    PT/INR - ( 03 Dec 2023 07:47 )   PT: 16.9 sec;   INR: 1.51 ratio      PTT - ( 03 Dec 2023 07:47 )  PTT:30.9 sec                              10.5   7.56  )-----------( 159      ( 02 Dec 2023 05:36 )             31.4     12-02    143  |  113<H>  |  15  ----------------------------<  111<H>  3.4<L>   |  26  |  0.50    Ca    7.9<L>      02 Dec 2023 05:36    TPro  6.2  /  Alb  2.6<L>  /  TBili  0.8  /  DBili  x   /  AST  21  /  ALT  22  /  AlkPhos  71  12-01        LIVER FUNCTIONS - ( 01 Dec 2023 08:44 )  Alb: 2.6 g/dL / Pro: 6.2 gm/dL / ALK PHOS: 71 U/L / ALT: 22 U/L / AST: 21 U/L / GGT: x           PT/INR - ( 01 Dec 2023 08:44 )   PT: 32.5 sec;   INR: 2.97 ratio         PTT - ( 01 Dec 2023 08:44 )  PTT:48.3 sec  Urinalysis Basic - ( 02 Dec 2023 05:36 )    Color: x / Appearance: x / SG: x / pH: x  Gluc: 111 mg/dL / Ketone: x  / Bili: x / Urobili: x   Blood: x / Protein: x / Nitrite: x   Leuk Esterase: x / RBC: x / WBC x   Sq Epi: x / Non Sq Epi: x / Bacteria: x                          12.0   9.66  )-----------( 184      ( 01 Dec 2023 08:44 )             36.6     01 Dec 2023 08:44    144    |  112    |  18     ----------------------------<  102    3.4     |  26     |  0.72     Ca    8.4        01 Dec 2023 08:44    TPro  6.2    /  Alb  2.6    /  TBili  0.8    /  DBili  x      /  AST  21     /  ALT  22     /  AlkPhos  71     01 Dec 2023 08:44  PT/INR - ( 01 Dec 2023 08:44 )   PT: 32.5 sec;   INR: 2.97 ratio    PTT - ( 01 Dec 2023 08:44 )  PTT:48.3 sec  LIVER FUNCTIONS - ( 01 Dec 2023 08:44 )  Alb: 2.6 g/dL / Pro: 6.2 gm/dL / ALK PHOS: 71 U/L / ALT: 22 U/L / AST: 21 U/L / GGT: x           Urinalysis Basic - ( 01 Dec 2023 08:44 )  Color: x / Appearance: x / SG: x / pH: x  Gluc: 102 mg/dL / Ketone: x  / Bili: x / Urobili: x   Blood: x / Protein: x / Nitrite: x   Leuk Esterase: x / RBC: x / WBC x   Sq Epi: x / Non Sq Epi: x / Bacteria: x        MEDICATIONS  (STANDING):  heparin   Injectable 5000 Unit(s) SubCutaneous every 12 hours  piperacillin/tazobactam IVPB.. 3.375 Gram(s) IV Intermittent every 8 hours    MEDICATIONS  (PRN):  albuterol/ipratropium for Nebulization 3 milliLiter(s) Nebulizer every 6 hours PRN Shortness of Breath and/or Wheezing  ondansetron Injectable 4 milliGRAM(s) IV Push every 8 hours PRN Nausea and/or Vomiting      RADIOLOGY & ADDITIONAL TESTS:    ACC: [id number] EXAM:  CT CERVICAL SPINE   ORDERED BY: PRIYANKA QUIÑONES     ACC: [id number] EXAM:  CT BRAIN   ORDERED BY: PRIYANKA QUIÑONES     PROCEDURE DATE:  11/30/2023          INTERPRETATION:  CT head without IV contrast    CLINICAL INFORMATION:  Fall   Intracranialhemorrhage.    TECHNIQUE: Contiguous axial 5 mm sections were obtained through the head.   Sagittal and coronal 2-D reformatted images were also obtained.   This   scan was performed using automatic exposure control (radiation dose   reduction software) to obtain a diagnostic image quality scan with   patient dose as low as reasonably achievable.    FINDINGS:   CT dated 12/18/2022 available for review.    The brain demonstrates mild periventricular white matter ischemia.   No   acute cerebral cortical infarct is seen.  No intracranial hemorrhage is   found.  No mass effect is found in the brain.    The ventricles, sulci and basal cisterns appear unremarkable.    The orbits are unremarkable.  The paranasal sinuses are clear.  The nasal   cavity appears intact.  The nasopharynx is symmetric.  The central skull   base, petrous temporal bones and calvarium remain intact.    CT cervical spine without IV contrast    CLINICAL INFORMATION: Fracture, trauma, neck pain.  Neck pain, spinal   stenosis, spondylosis. unresponsive and fall, food impaction    TECHNIQUE:  Contiguous axial 2.0 mm sections were obtained through the   cervical spine using a single helical acquisition.  Additional 2 mm   sagittal and coronal reformatted reconstructions of the spine were   obtained.  These additional reformatted images were used to evaluate the   spine for alignment, vertebral fractures and the integrity of the the   posterior elements.   This scan was performed using automatic exposure   control (radiation dose reduction software) to obtain a diagnostic image   quality scan with patient dose as low as reasonably achievable.    FINDINGS:   No prior similar studies are available for review    Cervical vertebral body heights are maintained. No vertebral fracture is   seen. No destructive bone lesion is found.  Alignment is preserved.    Facet joints appear intact and aligned.    Cervical intervertebral disc spaces show mild degenerative disc disease   and spondylosis at C3-4 through C6/7 withloss of disc height and   associated degenerative endplate changes. There is narrowing of the LEFT   C2-3, BILATERAL C3-4, C4-5, C5-6 and C6-7 neural foramina due to   uncovertebral spurring and facet osteophytic hypertrophy. Degenerative   ridge/disc complexes at C4-5 and C5-6 flatten the ventral thecal sac.    The skull base appears intact.  No neck mass is recognized.  Paraspinal   soft tissues appear intact. Visualized lymph nodes appear to be within   physiologic size limits.    Food impaction is seen within a dilated proximal esophagus.    IMPRESSION:    CT HEAD: mild periventricular white matter ischemia    CT cervical spine:   No vertebral fracture is recognized.  Mild   degenerative disc disease and spondylosis at C3-4 through C6/7 with loss   of disc height and associated degenerative endplate changes. There is   narrowing of the LEFT C2-3, BILATERAL C3-4, C4-5, C5-6 and C6-7 neural   foramina due to uncovertebral spurring and facet osteophytic hypertrophy.   Degenerative ridge/disc complexes at C4-5 and C5-6 flatten the ventral   thecal sac. Food impaction is seen within a dilated proximal esophagus.   Recommend CT chest with IV contrast) and oral contrast ingested   immediately prior to examination for further evaluation.        ACC: [id number] EXAM:  CT ABDOMEN AND PELVIS IC   ORDERED BY: PRIYANKA QUIÑONES     ACC: [id number] EXAM:  CT ANGIO CHEST PULM ART WAWIC   ORDERED BY: PRIYANKA QUIÑONES     PROCEDURE DATE:  11/30/2023          INTERPRETATION:  CLINICAL INFORMATION: Hypoxia, abdominal pain    COMPARISON: CT chest abdomen and pelvis 12/18/2022    CONTRAST/COMPLICATIONS:  IV Contrast: 90 mm of Omnipaque 350  Oral Contrast: NONE  Complications: None reported at time of study completion    PROCEDURE:  CT Angiography of the Chest was performed followed by portal venous phase   imaging of the Abdomen and Pelvis.  Sagittal and coronal reformats were performed as well as 3D (MIP)   reconstructions.    FINDINGS:  CHEST:  LUNGS AND LARGE AIRWAYS: Patent central airways. Mucoid impactions and  consolidation of basilar left lower lobe. Bilateral tree-in-bud opacities   likely impacted distal airways/aspiration.  PLEURA: No pleural effusion.  VESSELS: No pulmonary embolus. Normal aorta.  HEART: Heart size is normal. No pericardial effusion. Mild coronary   calcification.  MEDIASTINUM AND BUNNY: No lymphadenopathy. Moderate retained ingested   material/food bolus within the esophagus.  CHEST WALL AND LOWER NECK: Within normal limits.    ABDOMEN AND PELVIS:  LIVER: Multiple liver cysts.  BILE DUCTS: Normal caliber.  GALLBLADDER: Within normal limits.  SPLEEN: Within normal limits.  PANCREAS: Within normal limits.  ADRENALS: Within normal limits.  KIDNEYS/URETERS: Multiple bilateral renal cysts.    BLADDER: Within normal limits.  REPRODUCTIVE ORGANS: Calcified fibroid.    BOWEL: No bowel obstruction. Appendix is normal.  PERITONEUM: No ascites.  VESSELS: Mild atherosclerosis.  RETROPERITONEUM/LYMPH NODES: No lymphadenopathy.  ABDOMINAL WALL: Within normal limits.  BONES: Multilevelcompression fracture of the vertebral bodies and   vertebroplasty. Left hip arthroplasty.    IMPRESSION:  No pulmonary embolus.    Mucoid impactions and consolidation of basilar left lower lobe. Bilateral   tree-in-bud opacities likely impacted distal airways/aspiration.    Moderate retained ingested material/food bolus within the esophagus.    No acute finding within abdomen and pelvis.

## 2023-12-03 NOTE — PROGRESS NOTE ADULT - ASSESSMENT
Pt is a 77 yo female who presents to ED BIBA, s/p aspiration of Yams followed by apneic episode, admitted due FOR:     # Acute  Hypoxic Respiratory failure due to  Aspiration PNA 2/2 choking episode   # Episode of Apnea,  s/p 4 compressions to chest at the facility, was suctioned by EMS  RVP/COVID negative  CT: Food in proximal esophagus  Aspiration precautions  F/u BCX: NGTD  C/w Zosyn IV   Duonebs  Tylenol ORN for pain  Lidocain patch   Supplemental O2  to keep Pulse ox above 92%    # Food Impaction?   Imaging reviewed and D/w Dr Montanez, no food impaction  On  full liquid diet, tolerates well, will advance further to  [facility] soft after CT   D/w S&S no evidence of oropharyngeal dysphagia   CT head and Cspine with no acute findings     Abd Pain with diarrhea  UTI? CT on admission  with no abn i/abd findings  CT abd/pelvis ordered stat   if continues with loose stools will send  CDIFF PCR and GI PCR    # Abnormal UA, suspected  UTI  f/u urine culture  c/w IV abx    #Protein calorie malnutrition  nutrition consult    #Hyperglycemia, likely stress related   A1c: 5.6      # Hypokalemia  Replace PO Potassium   Monitor     ACP: MOLST for DNR/DNI, order placed, MOLST in chart      PT eval     Dispo; IV Abxs, CT abd/pelvis, F/u UCX. Monitor for diarrhea  Pt is a 77 yo female who presents to ED BIBA, s/p aspiration of Yams followed by apneic episode, admitted due FOR:     # Acute  Hypoxic Respiratory failure due to  Aspiration PNA 2/2 choking episode   # Episode of Apnea,  s/p 4 compressions to chest at the facility, was suctioned by EMS  RVP/COVID negative  CT: Food in proximal esophagus  Aspiration precautions  F/u BCX: NGTD  C/w Zosyn IV   Duonebs  Tylenol ORN for pain  Lidocain patch   Supplemental O2  to keep Pulse ox above 92%    # Food Impaction?   Imaging reviewed and D/w Dr Montanez, no food impaction  On  full liquid diet, tolerates well, will advance further to  [facility] soft after CT   D/w S&S no evidence of oropharyngeal dysphagia   CT head and Cspine with no acute findings     Abd Pain with diarrhea  UTI? CT on admission  with no abn i/abd findings  CT abd/pelvis ordered stat   if continues with loose stools will send  CDIFF PCR and GI PCR    # Abnormal UA, suspected  UTI  f/u urine culture  c/w IV abx    #Protein calorie malnutrition  nutrition consult    #Hyperglycemia, likely stress related   A1c: 5.6      # Hypokalemia  Replace PO Potassium   Monitor     ACP: MOLST for DNR/DNI, order placed, MOLST in chart      PT eval     Dispo; IV Abxs, CT abd/pelvis, F/u UCX. Monitor for diarrhea  Pt is a 79 yo female who presents to ED BIBA, s/p aspiration of Yams followed by apneic episode, admitted due FOR:     # Acute  Hypoxic Respiratory failure due to  Aspiration PNA 2/2 choking episode   # Episode of Apnea,  s/p 4 compressions to chest at the facility, was suctioned by EMS  RVP/COVID negative  CT: Food in proximal esophagus  Aspiration precautions  F/u BCX: NGTD  C/w Zosyn IV   Duonebs  Tylenol ORN for pain  Lidocain patch   Supplemental O2  to keep Pulse ox above 92%    # Food Impaction?   Imaging reviewed and D/w Dr Montanez, no food impaction  On  full liquid diet, tolerates well, will advance further to  [facility] soft after CT   D/w S&S no evidence of oropharyngeal dysphagia   CT head and Cspine with no acute findings     Abd Pain with diarrhea  UTI? CT on admission  with no abn i/abd findings  CT abd/pelvis ordered stat   if continues with loose stools will send  CDIFF PCR and GI PCR    # Abnormal UA, suspected  UTI  f/u urine culture  c/w IV abx    #Protein calorie malnutrition  nutrition consult    #Hyperglycemia, likely stress related   A1c: 5.6      # Hypokalemia  Replace PO Potassium   Monitor     ACP: MOLST for DNR/DNI, order placed, MOLST in chart      PT eval     Dispo; IV Abxs, CT abd/pelvis, F/u UCX. Monitor for diarrhea  Pt is a 79 yo female who presents to ED BIBA, s/p aspiration of Yams followed by apneic episode, admitted due FOR:     # Acute  Hypoxic Respiratory failure due to  Aspiration PNA 2/2 choking episode   # Episode of Apnea,  s/p 4 compressions to chest at the facility, was suctioned by EMS  RVP/COVID negative  CT: Food in proximal esophagus  Aspiration precautions  F/u BCX: NGTD  C/w Zosyn IV day 3   Duonebs  Tylenol ORN for pain  Lidocain patch ro L chest   Supplemental O2  to keep Pulse ox above 92%    # Food Impaction?   Imaging reviewed and D/w Dr Montanez, no food impaction  On  full liquid diet, tolerates well, further advanced to  [facility] soft after CT   D/w S&S no evidence of oropharyngeal dysphagia   CT head and Cspine with no acute findings   aspiration precautions     Abd Pain with diarrhea  UTI ruled out. UCX >3organism, contamination   Repeat CT abd/pelvis no acute fonsdings   As d/w Dr Villasenor, has h/o CDIff will send  CDIFF PCR and GI PCR if continues with loose stool   start Vanco for PPX since on IV Abxs         #Protein calorie malnutrition  nutrition consult    #Hyperglycemia, likely stress related   A1c: 5.6      # Hypokalemia  Replace PO Potassium   Monitor     # H/o DVT   On coumadin as d/w Dr Villasenor ( PCP) coumadin was held  day before admission due to elevated INR   Resumed on coumadin 2mg Po QD ( was on 4mg)   Monitor INR daily   On Heparin SQ for  DVT PPX, dc when INR Tx     ACP: MOLST for DNR/DNI, order placed, MOLST in chart      PT eval     Dispo; IV Abxs, send Stool studies  Pt is a 79 yo female who presents to ED BIBA, s/p aspiration of Yams followed by apneic episode, admitted due FOR:     # Acute  Hypoxic Respiratory failure due to  Aspiration PNA 2/2 choking episode   # Episode of Apnea,  s/p 4 compressions to chest at the facility, was suctioned by EMS  RVP/COVID negative  CT: Food in proximal esophagus  Aspiration precautions  F/u BCX: NGTD  C/w Zosyn IV day 3   Duonebs  Tylenol ORN for pain  Lidocain patch ro L chest   Supplemental O2  to keep Pulse ox above 92%    # Food Impaction?   Imaging reviewed and D/w Dr Montanez, no food impaction  On  full liquid diet, tolerates well, further advanced to  [facility] soft after CT   D/w S&S no evidence of oropharyngeal dysphagia   CT head and Cspine with no acute findings   aspiration precautions     Abd Pain with diarrhea  UTI ruled out. UCX >3organism, contamination   Repeat CT abd/pelvis no acute fonsdings   As d/w Dr Villasenor, has h/o CDIff will send  CDIFF PCR and GI PCR if continues with loose stool   start Vanco for PPX since on IV Abxs         #Protein calorie malnutrition  nutrition consult    #Hyperglycemia, likely stress related   A1c: 5.6      # Hypokalemia  Replace PO Potassium   Monitor     # H/o DVT   On coumadin as d/w Dr Villasenor ( PCP) coumadin was held  day before admission due to elevated INR   Resumed on coumadin 2mg Po QD ( was on 4mg)   Monitor INR daily   On Heparin SQ for  DVT PPX, dc when INR Tx     ACP: MOLST for DNR/DNI, order placed, MOLST in chart      PT eval     Dispo; IV Abxs, send Stool studies  Pt is a 77 yo female who presents to ED BIBA, s/p aspiration of Yams followed by apneic episode, admitted due FOR:     # Acute  Hypoxic Respiratory failure due to  Aspiration PNA 2/2 choking episode   # Episode of Apnea,  s/p 4 compressions to chest at the facility, was suctioned by EMS  RVP/COVID negative  CT: Food in proximal esophagus  Aspiration precautions  F/u BCX: NGTD  C/w Zosyn IV day 3   Duonebs  Tylenol ORN for pain  Lidocain patch ro L chest   Supplemental O2  to keep Pulse ox above 92%    # Food Impaction?   Imaging reviewed and D/w Dr Montanez, no food impaction  On  full liquid diet, tolerates well, further advanced to  [facility] soft after CT   D/w S&S no evidence of oropharyngeal dysphagia   CT head and Cspine with no acute findings   aspiration precautions     Abd Pain with diarrhea  UTI ruled out. UCX >3organism, contamination   Repeat CT abd/pelvis no acute fonsdings   As d/w Dr Villasenor, has h/o CDIff will send  CDIFF PCR and GI PCR if continues with loose stool   start Vanco for PPX since on IV Abxs         #Protein calorie malnutrition  nutrition consult    #Hyperglycemia, likely stress related   A1c: 5.6      # Hypokalemia  Replace PO Potassium   Monitor     # H/o DVT   On coumadin as d/w Dr Villasenor ( PCP) coumadin was held  day before admission due to elevated INR   Resumed on coumadin 2mg Po QD ( was on 4mg)   Monitor INR daily   On Heparin SQ for  DVT PPX, dc when INR Tx     ACP: MOLST for DNR/DNI, order placed, MOLST in chart      PT eval     Dispo; IV Abxs, send Stool studies

## 2023-12-04 ENCOUNTER — TRANSCRIPTION ENCOUNTER (OUTPATIENT)
Age: 78
End: 2023-12-04

## 2023-12-04 VITALS — OXYGEN SATURATION: 95 % | RESPIRATION RATE: 18 BRPM

## 2023-12-04 LAB
ANION GAP SERPL CALC-SCNC: 4 MMOL/L — LOW (ref 5–17)
ANION GAP SERPL CALC-SCNC: 4 MMOL/L — LOW (ref 5–17)
BUN SERPL-MCNC: 10 MG/DL — SIGNIFICANT CHANGE UP (ref 7–23)
BUN SERPL-MCNC: 10 MG/DL — SIGNIFICANT CHANGE UP (ref 7–23)
C DIFF BY PCR RESULT: SIGNIFICANT CHANGE UP
C DIFF BY PCR RESULT: SIGNIFICANT CHANGE UP
CALCIUM SERPL-MCNC: 8.5 MG/DL — SIGNIFICANT CHANGE UP (ref 8.5–10.1)
CALCIUM SERPL-MCNC: 8.5 MG/DL — SIGNIFICANT CHANGE UP (ref 8.5–10.1)
CHLORIDE SERPL-SCNC: 109 MMOL/L — HIGH (ref 96–108)
CHLORIDE SERPL-SCNC: 109 MMOL/L — HIGH (ref 96–108)
CO2 SERPL-SCNC: 28 MMOL/L — SIGNIFICANT CHANGE UP (ref 22–31)
CO2 SERPL-SCNC: 28 MMOL/L — SIGNIFICANT CHANGE UP (ref 22–31)
CREAT SERPL-MCNC: 0.58 MG/DL — SIGNIFICANT CHANGE UP (ref 0.5–1.3)
CREAT SERPL-MCNC: 0.58 MG/DL — SIGNIFICANT CHANGE UP (ref 0.5–1.3)
EGFR: 93 ML/MIN/1.73M2 — SIGNIFICANT CHANGE UP
EGFR: 93 ML/MIN/1.73M2 — SIGNIFICANT CHANGE UP
GLUCOSE SERPL-MCNC: 107 MG/DL — HIGH (ref 70–99)
GLUCOSE SERPL-MCNC: 107 MG/DL — HIGH (ref 70–99)
HCT VFR BLD CALC: 34 % — LOW (ref 34.5–45)
HCT VFR BLD CALC: 34 % — LOW (ref 34.5–45)
HGB BLD-MCNC: 11.3 G/DL — LOW (ref 11.5–15.5)
HGB BLD-MCNC: 11.3 G/DL — LOW (ref 11.5–15.5)
INR BLD: 1.19 RATIO — HIGH (ref 0.85–1.18)
INR BLD: 1.19 RATIO — HIGH (ref 0.85–1.18)
MCHC RBC-ENTMCNC: 31.9 PG — SIGNIFICANT CHANGE UP (ref 27–34)
MCHC RBC-ENTMCNC: 31.9 PG — SIGNIFICANT CHANGE UP (ref 27–34)
MCHC RBC-ENTMCNC: 33.2 GM/DL — SIGNIFICANT CHANGE UP (ref 32–36)
MCHC RBC-ENTMCNC: 33.2 GM/DL — SIGNIFICANT CHANGE UP (ref 32–36)
MCV RBC AUTO: 96 FL — SIGNIFICANT CHANGE UP (ref 80–100)
MCV RBC AUTO: 96 FL — SIGNIFICANT CHANGE UP (ref 80–100)
PLATELET # BLD AUTO: 209 K/UL — SIGNIFICANT CHANGE UP (ref 150–400)
PLATELET # BLD AUTO: 209 K/UL — SIGNIFICANT CHANGE UP (ref 150–400)
POTASSIUM SERPL-MCNC: 4 MMOL/L — SIGNIFICANT CHANGE UP (ref 3.5–5.3)
POTASSIUM SERPL-MCNC: 4 MMOL/L — SIGNIFICANT CHANGE UP (ref 3.5–5.3)
POTASSIUM SERPL-SCNC: 4 MMOL/L — SIGNIFICANT CHANGE UP (ref 3.5–5.3)
POTASSIUM SERPL-SCNC: 4 MMOL/L — SIGNIFICANT CHANGE UP (ref 3.5–5.3)
PROTHROM AB SERPL-ACNC: 13.4 SEC — HIGH (ref 9.5–13)
PROTHROM AB SERPL-ACNC: 13.4 SEC — HIGH (ref 9.5–13)
RBC # BLD: 3.54 M/UL — LOW (ref 3.8–5.2)
RBC # BLD: 3.54 M/UL — LOW (ref 3.8–5.2)
RBC # FLD: 12.1 % — SIGNIFICANT CHANGE UP (ref 10.3–14.5)
RBC # FLD: 12.1 % — SIGNIFICANT CHANGE UP (ref 10.3–14.5)
SARS-COV-2 RNA SPEC QL NAA+PROBE: SIGNIFICANT CHANGE UP
SARS-COV-2 RNA SPEC QL NAA+PROBE: SIGNIFICANT CHANGE UP
SODIUM SERPL-SCNC: 141 MMOL/L — SIGNIFICANT CHANGE UP (ref 135–145)
SODIUM SERPL-SCNC: 141 MMOL/L — SIGNIFICANT CHANGE UP (ref 135–145)
WBC # BLD: 5.84 K/UL — SIGNIFICANT CHANGE UP (ref 3.8–10.5)
WBC # BLD: 5.84 K/UL — SIGNIFICANT CHANGE UP (ref 3.8–10.5)
WBC # FLD AUTO: 5.84 K/UL — SIGNIFICANT CHANGE UP (ref 3.8–10.5)
WBC # FLD AUTO: 5.84 K/UL — SIGNIFICANT CHANGE UP (ref 3.8–10.5)

## 2023-12-04 PROCEDURE — 99239 HOSP IP/OBS DSCHRG MGMT >30: CPT

## 2023-12-04 RX ORDER — APIXABAN 2.5 MG/1
1 TABLET, FILM COATED ORAL
Qty: 0 | Refills: 0 | DISCHARGE
Start: 2023-12-04

## 2023-12-04 RX ORDER — APIXABAN 2.5 MG/1
2.5 TABLET, FILM COATED ORAL EVERY 12 HOURS
Refills: 0 | Status: DISCONTINUED | OUTPATIENT
Start: 2023-12-04 | End: 2023-12-04

## 2023-12-04 RX ORDER — VANCOMYCIN HCL 1 G
1 VIAL (EA) INTRAVENOUS
Qty: 44 | Refills: 0
Start: 2023-12-04 | End: 2023-12-14

## 2023-12-04 RX ORDER — IPRATROPIUM/ALBUTEROL SULFATE 18-103MCG
3 AEROSOL WITH ADAPTER (GRAM) INHALATION
Qty: 0 | Refills: 0 | DISCHARGE
Start: 2023-12-04

## 2023-12-04 RX ORDER — LIDOCAINE 4 G/100G
1 CREAM TOPICAL
Qty: 0 | Refills: 0 | DISCHARGE
Start: 2023-12-04 | End: 2023-12-09

## 2023-12-04 RX ADMIN — PIPERACILLIN AND TAZOBACTAM 25 GRAM(S): 4; .5 INJECTION, POWDER, LYOPHILIZED, FOR SOLUTION INTRAVENOUS at 05:37

## 2023-12-04 RX ADMIN — LIDOCAINE 1 PATCH: 4 CREAM TOPICAL at 09:34

## 2023-12-04 RX ADMIN — PIPERACILLIN AND TAZOBACTAM 25 GRAM(S): 4; .5 INJECTION, POWDER, LYOPHILIZED, FOR SOLUTION INTRAVENOUS at 14:26

## 2023-12-04 RX ADMIN — HEPARIN SODIUM 5000 UNIT(S): 5000 INJECTION INTRAVENOUS; SUBCUTANEOUS at 09:34

## 2023-12-04 RX ADMIN — Medication 125 MILLIGRAM(S): at 13:19

## 2023-12-04 RX ADMIN — Medication 125 MILLIGRAM(S): at 17:33

## 2023-12-04 RX ADMIN — Medication 125 MILLIGRAM(S): at 01:45

## 2023-12-04 RX ADMIN — Medication 125 MILLIGRAM(S): at 05:37

## 2023-12-04 NOTE — DISCHARGE NOTE PROVIDER - NSDCMRMEDTOKEN_GEN_ALL_CORE_FT
acetaminophen 325 mg oral tablet: 2 tab(s) orally every 6 hours  amoxicillin-clavulanate 875 mg-125 mg oral tablet: 1 tab(s) orally every 12 hours complete on 2023  apixaban 2.5 mg oral tablet: 1 tab(s) orally every 12 hours  clonazePAM 0.5 mg oral tablet, disintegratin tab(s) sublingually 2 times a day  cyanocobalamin 1000 mcg sublingual tablet: 1 tab(s) sublingually once a day  escitalopram 10 mg oral tablet: 1.5 tab(s) orally once a day  gabapentin 100 mg oral capsule: 3 cap(s) orally once a day (at bedtime)  ipratropium-albuterol 0.5 mg-2.5 mg/3 mL inhalation solution: 3 milliliter(s) inhaled every 6 hours  lidocaine 4% topical film: Apply topically to affected area once a day  Multiple Vitamins oral tablet: 1 tab(s) orally once a day  vancomycin 125 mg oral capsule: 1 cap(s) orally every 6 hours  Vitamin D3 1000 intl units (25 mcg) oral tablet: 2 tab(s) orally once a day

## 2023-12-04 NOTE — DISCHARGE NOTE NURSING/CASE MANAGEMENT/SOCIAL WORK - PATIENT PORTAL LINK FT
You can access the FollowMyHealth Patient Portal offered by [facility] by registering at the following website: [email or web address]. By joining [employer]’s FollowMyHealth portal, you will also be able to view your health information using other applications (apps) compatible with our system. You can access the FollowMyHealth Patient Portal offered by [facility] by registering at the following website: [email or web address]. By joining [employer]’s FollowMyHealth portal, you will also be able to view your health information using other applications (apps) compatible with our system.

## 2023-12-04 NOTE — DISCHARGE NOTE PROVIDER - CARE PROVIDER_API CALL
Roosevelt Villasenor  Family Medicine  [address] [place] [id number]  Phone: [phone]  Fax: [phone]  Follow Up Time: 1-3 days   Roosevelt Villasenor  Family Medicine  [address] [place] [id number]  Phone: [phone]  Fax: [phone]  Follow Up Time: 1-3 days

## 2023-12-04 NOTE — DISCHARGE NOTE PROVIDER - NSDCQMERRANDS_GEN_ALL_CORE
NATHAN MEEKS ; 09/27/2019 ; [facility] [id number] NATHAN MEEKS ; 09/27/2019 ; [facility] [id number] NATHAN MEEKS ; 09/27/2019 ; [facility] [id number] NATHAN MEEKS ; 09/27/2019 ; [facility] [id number]

## 2023-12-04 NOTE — DISCHARGE NOTE PROVIDER - NSDCCPCAREPLAN_GEN_ALL_CORE_FT
PRINCIPAL DISCHARGE DIAGNOSIS  Diagnosis: Pneumonia, aspiration  Assessment and Plan of Treatment: ASPIRATION PRECAUTIONS   PATIENT SHOULD BE SITTING UP WHEN EATING and at other times keep head of bed elevated at 30 degrees.  Take antibiotics as prescribed: AUGMENTIN with VANCOMYCIN PROPHYLAXIS      SECONDARY DISCHARGE DIAGNOSES  Diagnosis: DVT, lower extremity, recurrent  Assessment and Plan of Treatment: STOP COUMADIN, USE ELIQUIS INSTEAD, start tonight 12/4/23

## 2023-12-04 NOTE — DISCHARGE NOTE NURSING/CASE MANAGEMENT/SOCIAL WORK - NSDCPEFALRISK_GEN_ALL_CORE
For information on Fall & Injury Prevention, visit: [email or web address] OR  [email or web address] OR  https://www.cdc.gov/steadi/patient.html For information on Fall & Injury Prevention, visit: [email or web address] OR  [email or web address] OR  https://www.cdc.gov/steadi/patient.html

## 2023-12-04 NOTE — DISCHARGE NOTE PROVIDER - HOSPITAL COURSE
HPI:  77 yo female who has a pmh/o chronic back pain, GERD, anxiety, OP, hepatic cysts, LLE DVT not on AC per NH chart, displaced left intertrochanteric hip fracture, who resides at [facility] and was found on floor with blue lips and unresponsiveness, got 4 chest compressions. Yams suctioned from mouth and pt BIBA to  ED. Pt AAOx3 currently however states the last thing she remembers was eating then falling over. Pt originally placed on 2L NC for pox of 88% then switched to NRB with improvement of oxygen to 95%. GI and ICU consulted and no further intervention recommended other than admission to medicine for treatment of aspiration PNA. On admitting exam pt with areas of ecchymoses but states she has no pain on palpation. Pt also noted to have dried yams around face, cleaned. Pt appears comfortable, now saturating well on Room air. States she has had difficulty swallowing in past.     # Acute  Hypoxic Respiratory failure due to  Aspiration PNA 2/2 choking episode   # Episode of Apnea,  s/p 4 compressions to chest at the facility, was suctioned by EMS  RVP/COVID negative  CT: Food in proximal esophagus  Aspiration precautions  F/u BCX: NGTD  s/p Zosyn IV day 3, switch to PO AUGMENTIN for 4 more days    Duonebs  Supplemental O2  to keep Pulse ox above 92%    # Food Impaction?   Imaging reviewed and D/w Dr Montanez, no food impaction  On  full liquid diet, tolerates well, further advanced to  [facility] soft after CT   D/w S&S no evidence of oropharyngeal dysphagia   CT head and Cspine with no acute findings   aspiration precautions     Abd Pain with diarrhea  UTI ruled out. UCX >3organism, contamination   Repeat CT abd/pelvis no acute findings   As d/w Dr Villasenor, has h/o CDIff will send  CDIFF PCR and GI PCR if continues with loose stool   start Vanco for PPX since on IV Abxs, continue for a week after abx are completed     #Protein calorie malnutrition  nutrition consult    #Hyperglycemia, likely stress related   A1c: 5.6    # H/o DVT   On coumadin as d/w Dr Villasenor ( PCP) coumadin was held  day before admission due to elevated INR   Resumed on coumadin 2mg Po QD ( was on 4mg)   Monitor INR daily   On PACS noted that patient has recurrent DVT, will continue AC - but switch to ELiquis - discussed with franklin and she is agreeable  called Dr Villasenor to discuss, await callback      ACP: ILANA for DNR/DNI, order placed, ILANA in chart    OTHER DETAILS:   12/4 - no cp palps sob abdo pain, conversant, asks appropriate questions     PHYSICAL EXAM:  T(F): 99.9 (04 Dec 2023 15:07), Max: 99.9 (04 Dec 2023 15:07)  HR: 75 (04 Dec 2023 15:07) (75 - 80)  BP: 115/54 (04 Dec 2023 15:07) (106/61 - 119/75)  RR: 18 (04 Dec 2023 16:33) (17 - 18)  SpO2: 95% (04 Dec 2023 16:33) (95% - 100%) RA   GENERAL: NAD, breathing comfortably   HEAD:  Atraumatic, Normocephalic  EYES: EOMI, PERRLA, normal sclera  ENT: Moist mucous membranes  NECK: Supple, No JVD, no nuchal rigidity  CHEST/LUNG: Clear to auscultation bilaterally; No rales, rhonchi, wheezing, or rubs. Unlabored respirations  HEART: Regular rate and rhythm; No murmurs, rubs, or gallops  ABDOMEN: Bowel sounds present; Soft, Nontender, Nondistended. No hepatomegaly  EXTREMITIES:  no pitting bilaterally  NERVOUS SYSTEM:  Alert & Oriented X3, speech clear. No focal motor or sensory deficits  MSK: FROM all 4 extremities, full and equal strength  SKIN: No rashes or lesions    LABS: All Labs Reviewed:                     11.3   5.84  )-----------( 209      ( 04 Dec 2023 07:06 )             34.0   141  |  109<H>  |  10  ----------------------------<  107<H>  4.0   |  28  |  0.58    RADIOLOGY/EKG:  < from: CT Angio Chest PE Protocol w/ IV Cont (11.30.23 @ 15:05) >  No pulmonary embolus.  Mucoid impactions and consolidation of basilar left lower lobe. Bilateral   tree-in-bud opacities likely impacted distal airways/aspiration.  Moderate retained ingested material/food bolus within the esophagus.  No acute finding within abdomen and pelvis.    time spent on discharge - 50 mins            HPI:  77 yo female who has a pmh/o chronic back pain, GERD, anxiety, OP, hepatic cysts, LLE DVT not on AC per NH chart, displaced left intertrochanteric hip fracture, who resides at [facility] and was found on floor with blue lips and unresponsiveness, got 4 chest compressions. Yams suctioned from mouth and pt BIBA to  ED. Pt AAOx3 currently however states the last thing she remembers was eating then falling over. Pt originally placed on 2L NC for pox of 88% then switched to NRB with improvement of oxygen to 95%. GI and ICU consulted and no further intervention recommended other than admission to medicine for treatment of aspiration PNA. On admitting exam pt with areas of ecchymoses but states she has no pain on palpation. Pt also noted to have dried yams around face, cleaned. Pt appears comfortable, now saturating well on Room air. States she has had difficulty swallowing in past.     # Acute  Hypoxic Respiratory failure due to  Aspiration PNA 2/2 choking episode   # Episode of Apnea,  s/p 4 compressions to chest at the facility, was suctioned by EMS  RVP/COVID negative  CT: Food in proximal esophagus  Aspiration precautions  F/u BCX: NGTD  s/p Zosyn IV day 3, switch to PO AUGMENTIN for 4 more days    Duonebs  Supplemental O2  to keep Pulse ox above 92%    # Food Impaction?   Imaging reviewed and D/w Dr Montanez, no food impaction  On  full liquid diet, tolerates well, further advanced to  [facility] soft after CT   D/w S&S no evidence of oropharyngeal dysphagia   CT head and Cspine with no acute findings   aspiration precautions     Abd Pain with diarrhea  UTI ruled out. UCX >3organism, contamination   Repeat CT abd/pelvis no acute findings   As d/w Dr Villasenor, has h/o CDIff will send  CDIFF PCR and GI PCR if continues with loose stool   start Vanco for PPX since on IV Abxs, continue for a week after abx are completed     #Protein calorie malnutrition  nutrition consult    #Hyperglycemia, likely stress related   A1c: 5.6    # H/o DVT   On coumadin as d/w Dr Villasenor ( PCP) coumadin was held  day before admission due to elevated INR   Resumed on coumadin 2mg Po QD ( was on 4mg)   Monitor INR daily   On PACS noted that patient has recurrent DVT, will continue AC - but switch to ELiquis - discussed with franklin and she is agreeable  called Dr Villasenor to discuss, await callback      ACP: ILANA for DNR/DNI, order placed, ILANA in chart    OTHER DETAILS:   12/4 - no cp palps sob abdo pain, conversant, asks appropriate questions     PHYSICAL EXAM:  T(F): 99.9 (04 Dec 2023 15:07), Max: 99.9 (04 Dec 2023 15:07)  HR: 75 (04 Dec 2023 15:07) (75 - 80)  BP: 115/54 (04 Dec 2023 15:07) (106/61 - 119/75)  RR: 18 (04 Dec 2023 16:33) (17 - 18)  SpO2: 95% (04 Dec 2023 16:33) (95% - 100%) RA   GENERAL: NAD, breathing comfortably   HEAD:  Atraumatic, Normocephalic  EYES: EOMI, PERRLA, normal sclera  ENT: Moist mucous membranes  NECK: Supple, No JVD, no nuchal rigidity  CHEST/LUNG: Clear to auscultation bilaterally; No rales, rhonchi, wheezing, or rubs. Unlabored respirations  HEART: Regular rate and rhythm; No murmurs, rubs, or gallops  ABDOMEN: Bowel sounds present; Soft, Nontender, Nondistended. No hepatomegaly  EXTREMITIES:  no pitting bilaterally  NERVOUS SYSTEM:  Alert & Oriented X3, speech clear. No focal motor or sensory deficits  MSK: FROM all 4 extremities, full and equal strength  SKIN: No rashes or lesions    LABS: All Labs Reviewed:                     11.3   5.84  )-----------( 209      ( 04 Dec 2023 07:06 )             34.0   141  |  109<H>  |  10  ----------------------------<  107<H>  4.0   |  28  |  0.58    RADIOLOGY/EKG:  < from: CT Angio Chest PE Protocol w/ IV Cont (11.30.23 @ 15:05) >  No pulmonary embolus.  Mucoid impactions and consolidation of basilar left lower lobe. Bilateral   tree-in-bud opacities likely impacted distal airways/aspiration.  Moderate retained ingested material/food bolus within the esophagus.  No acute finding within abdomen and pelvis.    time spent on discharge - 50 mins

## 2023-12-06 LAB
CULTURE RESULTS: SIGNIFICANT CHANGE UP
SPECIMEN SOURCE: SIGNIFICANT CHANGE UP

## 2023-12-07 DIAGNOSIS — R19.7 DIARRHEA, UNSPECIFIED: ICD-10-CM

## 2023-12-07 DIAGNOSIS — R73.9 HYPERGLYCEMIA, UNSPECIFIED: ICD-10-CM

## 2023-12-07 DIAGNOSIS — M81.0 AGE-RELATED OSTEOPOROSIS WITHOUT CURRENT PATHOLOGICAL FRACTURE: ICD-10-CM

## 2023-12-07 DIAGNOSIS — Z79.01 LONG TERM (CURRENT) USE OF ANTICOAGULANTS: ICD-10-CM

## 2023-12-07 DIAGNOSIS — J69.0 PNEUMONITIS DUE TO INHALATION OF FOOD AND VOMIT: ICD-10-CM

## 2023-12-07 DIAGNOSIS — E87.6 HYPOKALEMIA: ICD-10-CM

## 2023-12-07 DIAGNOSIS — K21.9 GASTRO-ESOPHAGEAL REFLUX DISEASE WITHOUT ESOPHAGITIS: ICD-10-CM

## 2023-12-07 DIAGNOSIS — R06.81 APNEA, NOT ELSEWHERE CLASSIFIED: ICD-10-CM

## 2023-12-07 DIAGNOSIS — J96.01 ACUTE RESPIRATORY FAILURE WITH HYPOXIA: ICD-10-CM

## 2023-12-07 DIAGNOSIS — Z91.013 ALLERGY TO SEAFOOD: ICD-10-CM

## 2023-12-07 DIAGNOSIS — R10.9 UNSPECIFIED ABDOMINAL PAIN: ICD-10-CM

## 2023-12-07 DIAGNOSIS — Z86.718 PERSONAL HISTORY OF OTHER VENOUS THROMBOSIS AND EMBOLISM: ICD-10-CM

## 2023-12-15 ENCOUNTER — TRANSCRIPTION ENCOUNTER (OUTPATIENT)
Age: 78
End: 2023-12-15

## 2023-12-29 ENCOUNTER — TRANSCRIPTION ENCOUNTER (OUTPATIENT)
Age: 78
End: 2023-12-29

## 2024-07-01 ENCOUNTER — APPOINTMENT (OUTPATIENT)
Dept: ORTHOPEDIC SURGERY | Facility: CLINIC | Age: 79
End: 2024-07-01
Payer: MEDICARE

## 2024-07-01 VITALS
HEART RATE: 86 BPM | BODY MASS INDEX: 18.33 KG/M2 | SYSTOLIC BLOOD PRESSURE: 101 MMHG | HEIGHT: 65 IN | WEIGHT: 110 LBS | DIASTOLIC BLOOD PRESSURE: 67 MMHG

## 2024-07-01 DIAGNOSIS — Z87.39 PERSONAL HISTORY OF OTHER DISEASES OF THE MUSCULOSKELETAL SYSTEM AND CONNECTIVE TISSUE: ICD-10-CM

## 2024-07-01 DIAGNOSIS — M47.9 SPONDYLOSIS, UNSPECIFIED: ICD-10-CM

## 2024-07-01 DIAGNOSIS — M16.10 UNILATERAL PRIMARY OSTEOARTHRITIS, UNSPECIFIED HIP: ICD-10-CM

## 2024-07-01 DIAGNOSIS — S42.294A OTHER NONDISPLACED FRACTURE OF UPPER END OF RIGHT HUMERUS, INITIAL ENCOUNTER FOR CLOSED FRACTURE: ICD-10-CM

## 2024-07-01 PROCEDURE — 23600 CLTX PROX HUMRL FX W/O MNPJ: CPT | Mod: RT

## 2024-07-01 PROCEDURE — 99203 OFFICE O/P NEW LOW 30 MIN: CPT | Mod: 57

## 2024-07-08 PROBLEM — M47.9 ARTHRITIS OF BACK: Status: RESOLVED | Noted: 2024-07-03 | Resolved: 2024-07-08

## 2024-07-08 PROBLEM — M16.10 HIP ARTHRITIS: Status: RESOLVED | Noted: 2024-07-03 | Resolved: 2024-07-08

## 2024-07-08 PROBLEM — Z87.39 HISTORY OF OSTEOPOROSIS: Status: RESOLVED | Noted: 2024-07-03 | Resolved: 2024-07-08

## 2024-08-12 ENCOUNTER — APPOINTMENT (OUTPATIENT)
Dept: ORTHOPEDIC SURGERY | Facility: CLINIC | Age: 79
End: 2024-08-12

## 2025-06-26 NOTE — H&P ADULT - CARDIOVASCULAR
[facility] - Neuro Critical Care  Neurocritical Care  Progress Note    Admit Date: 6/24/2025  Service Date: 06/26/2025  Length of Stay: 2    Subjective:     Chief Complaint: Degenerative scoliosis in adult patient    History of Present Illness: Ms. Kayla Clay is a 42 y.o. female w/ significant PMHx of IVDU, HTN, and fungal discitis who presents to [facility] s/p T10-pelvis fusion. She initially presented to NSGY on an outpatient basis after longtime follow up with ID. She continued to have ongoing lower extremity weakness, using a cane for assistance with ambulation. She followed with NSGY on 5/26/23, where MRI showed resolution of epidural abscess and degenerative disc disease. She completed 6 weeks of IV antibiotics, empirically since initial cultures were negative using Ceftriaxone and Daptomycin. She also completed 3 months of Fluconazole high dose at 400mg PO q24h given Candida albicans positive cultures. She notably did not tolerate Vancomycin or Doxycycline. MRI with no findings significant for residual infection, though she is s/p T10-pelvis fusion to address severe stenosis of lumbar spine.     She is admitted to [facility] for hourly neuromonitoring post operatively.    Hospital Course: 06/25/2025  Resting comfortably in bed. Remains on fluconazole. Vancomycin changed to cefazolin.  06/26/2025  Drain output 500 cc over last 24h, will remain in place and hold chemical VTE ppx. Optimize pain regimen, increase gabapentin to BID     Interval History: Drain output 500 cc over last 24h, will remain in place and hold chemical VTE ppx. Optimize pain regimen, increase gabapentin to BID. Awaiting bed availability on stepdown unit.     Review of Systems: +back pain, +leg weakness, + nausea    Vitals:   Temp: 98.3 °F (36.8 °C)  Pulse: 84  Rhythm: normal sinus rhythm  BP: (!) 93/50  MAP (mmHg): 66  Resp: 13  SpO2: 99 %  Oxygen Concentration (%): 28    Temp  Min: 98 °F (36.7 °C)  Max: 98.7 °F (37.1 °C)  Pulse  Min: 78  Max:  105  BP  Min: 91/59  Max: 122/58  MAP (mmHg)  Min: 66  Max: 84  Resp  Min: 10  Max: 48  SpO2  Min: 93 %  Max: 100 %  Oxygen Concentration (%)  Min: 28  Max: 28    06/25 0701 - 06/26 0700  In: 1664.7 [P.O.:570; I.V.:1078.1]  Out: 1512 [Urine:1025; Drains:487]   Unmeasured Output  Unmeasured Stool Occurrence: 0     Examination:   Constitutional: Well-nourished and -developed. No apparent distress.   Eyes: Conjunctiva clear, anicteric. Lids no lesions.  Head/Ears/Nose/Mouth/Throat/Neck: Moist mucous membranes. External ears, nose atraumatic.   Cardiovascular: Regular rhythm. No murmurs. No leg edema.  Respiratory: Comfortable respirations.   Neurologic:  -GCS [id number]  -Alert. Oriented to person, place, and time. Speech fluent. Follows commands.  -Cranial nerves intact   -Motor full strength in uppers, lower extremity exam limited by pain, at least antigravity in bilateral lowers   -Sensation intact to light touch throughout       Medications:   Continuous Scheduledbaclofen, 10 mg, Nightly  calcium carbonate, 500 mg, BID  ceFAZolin (Ancef) IV (PEDS and ADULTS), 1 g, Q8H  EScitalopram oxalate, 20 mg, QHS  famotidine, 40 mg, Daily  fluconazole, 400 mg, Daily  gabapentin, 300 mg, BID  methocarbamoL, 750 mg, QID  mupirocin, , BID  polyethylene glycol, 17 g, BID  senna-docusate, 2 tablet, BID  tamsulosin, 0.4 mg, Daily    PRNaluminum-magnesium hydroxide-simethicone, 30 mL, Q4H PRN  diazePAM, 5 mg, BID PRN  HYDROmorphone, 1 mg, Q4H PRN  hydrOXYzine HCL, 25 mg, Q4H PRN  magnesium oxide, 800 mg, PRN  magnesium oxide, 800 mg, PRN  melatonin, 6 mg, Nightly PRN  ondansetron, 8 mg, Q6H PRN  oxyCODONE, 15 mg, Q4H PRN  oxyCODONE, 10 mg, Q4H PRN  potassium bicarbonate, 35 mEq, PRN  potassium bicarbonate, 50 mEq, PRN  potassium bicarbonate, 60 mEq, PRN  potassium, sodium phosphates, 2 packet, PRN  potassium, sodium phosphates, 2 packet, PRN  potassium, sodium phosphates, 2 packet, PRN  prochlorperazine, 5 mg, Q6H PRN  sumatriptan, 50  mg, PRN       Today I independently reviewed pertinent medications, imaging, laboratory results, notably:     - hgb stable  - lytes OK   - MM pain regimen reviewed and adjusted    Chem:   Recent Labs   Lab 06/26/25  0012      K 3.9      CO2 24      BUN 9   CREATININE 0.6   CALCIUM 8.0*   MG 1.6   PHOS 2.4*   ANIONGAP 8   PROT 5.7*   ALBUMIN 3.1*   BILITOT 0.4   ALKPHOS 36*   AST 26   ALT 20     Heme:   Recent Labs   Lab 06/26/25  0012   WBC 8.29   HGB 9.8*   HCT 28.9*              Assessment/Plan:     Neuro  * Degenerative scoliosis in adult patient  42 y.o. female w/ significant PMHx of IVDU, HTN, and fungal discitis who completed 6 weeks of IV antibiotics, empirically since initial cultures were negative using Ceftriaxone and Daptomycin. She also completed 3 months of Fluconazole high dose at 400mg PO q24h given Candida albicans positive cultures.  She is s/p T-10-pelvis fusion for degenerative scoliosis 2/2 discitis history.    -Admitted to [facility], NSGY following - stable for TTF from NCC perspective  -q1h neuro checks, vital checks can expand to q4  -Daily CBC, CMP, mag, phos  -SBP < 160, prn labetalol and hydralazine  -SCDs, hold DVT chemoppx given high drain output over last 24h (500cc)   -PT/OT/SLP consulted, currently recommending low intensity therapy  -Multimodal pain regimen, OK   -Post op imaging per NSGY  -TLSO brace when OOB  -Cefazolin while drains in place per NSGY  -Fluconazole x 7 d per ID       Cardiac/Vascular  Hypertension  SBP < 160  Holding home amlodipine in setting of low pressures, restart as needed             The patient is being Prophylaxed for:  Venous Thromboembolism with: Mechanical  Stress Ulcer with: H2B  Ventilator Pneumonia with: not applicable    Activity Orders            Turn patient starting at 06/26 1217    Ambulate starting at 06/25 0000    Up in chair With meals starting at 06/24 1700    Diet Adult Regular: Regular starting at 06/24 1437     Progressive Mobility Protocol (mobilize patient to their highest level of functioning at least twice daily) starting at 06/24 1436    Elevate HOB 30 starting at 06/24 1436    Ambulate With Assistance, Post Op Day 0 If the patient arrives from PACU by 4PM, walk at least once on day of operation if alert and safe to do so. starting at 06/24 1435          Full Code    Level 3     Patt Acharya PA-C  Neurocritical Care  [facility] - Neuro Critical Care       negative regular rate and rhythm/S1 S2 present/no pedal edema